# Patient Record
Sex: MALE | Race: WHITE | Employment: OTHER | URBAN - METROPOLITAN AREA
[De-identification: names, ages, dates, MRNs, and addresses within clinical notes are randomized per-mention and may not be internally consistent; named-entity substitution may affect disease eponyms.]

---

## 2017-02-14 DIAGNOSIS — F51.01 PRIMARY INSOMNIA: ICD-10-CM

## 2017-02-15 RX ORDER — DIAZEPAM 5 MG/1
2.5 TABLET ORAL
Qty: 45 TAB | Refills: 1 | Status: SHIPPED | OUTPATIENT
Start: 2017-02-15 | End: 2017-08-31 | Stop reason: SDUPTHER

## 2017-02-16 ENCOUNTER — TELEPHONE (OUTPATIENT)
Dept: INTERNAL MEDICINE CLINIC | Age: 82
End: 2017-02-16

## 2017-03-16 ENCOUNTER — TELEPHONE (OUTPATIENT)
Dept: INTERNAL MEDICINE CLINIC | Age: 82
End: 2017-03-16

## 2017-03-16 NOTE — TELEPHONE ENCOUNTER
Call to patient. LM to return call with information regarding whether he had a flu shot this season or declined.

## 2017-04-03 DIAGNOSIS — I10 ESSENTIAL HYPERTENSION: ICD-10-CM

## 2017-04-03 RX ORDER — FELODIPINE 5 MG/1
5 TABLET, EXTENDED RELEASE ORAL DAILY
Qty: 90 TAB | Refills: 1 | Status: SHIPPED | COMMUNITY
Start: 2017-04-03 | End: 2017-07-13 | Stop reason: SDUPTHER

## 2017-04-03 RX ORDER — LISINOPRIL 10 MG/1
10 TABLET ORAL DAILY
Qty: 90 TAB | Refills: 1 | Status: SHIPPED | COMMUNITY
Start: 2017-04-03 | End: 2017-07-13 | Stop reason: SDUPTHER

## 2017-07-13 DIAGNOSIS — I10 ESSENTIAL HYPERTENSION: ICD-10-CM

## 2017-07-13 RX ORDER — FELODIPINE 5 MG/1
TABLET, EXTENDED RELEASE ORAL
Qty: 90 TAB | Refills: 0 | Status: SHIPPED | OUTPATIENT
Start: 2017-07-13 | End: 2017-08-31 | Stop reason: SDUPTHER

## 2017-07-13 RX ORDER — LISINOPRIL 10 MG/1
TABLET ORAL
Qty: 90 TAB | Refills: 0 | Status: SHIPPED | OUTPATIENT
Start: 2017-07-13 | End: 2017-08-31 | Stop reason: SDUPTHER

## 2017-07-21 ENCOUNTER — TELEPHONE (OUTPATIENT)
Dept: INTERNAL MEDICINE CLINIC | Age: 82
End: 2017-07-21

## 2017-07-21 ENCOUNTER — OFFICE VISIT (OUTPATIENT)
Dept: INTERNAL MEDICINE CLINIC | Age: 82
End: 2017-07-21

## 2017-07-21 VITALS
OXYGEN SATURATION: 95 % | WEIGHT: 159.4 LBS | TEMPERATURE: 97.7 F | SYSTOLIC BLOOD PRESSURE: 128 MMHG | HEART RATE: 66 BPM | RESPIRATION RATE: 18 BRPM | BODY MASS INDEX: 24.16 KG/M2 | DIASTOLIC BLOOD PRESSURE: 70 MMHG | HEIGHT: 68 IN

## 2017-07-21 DIAGNOSIS — T63.461A WASP STING, ACCIDENTAL OR UNINTENTIONAL, INITIAL ENCOUNTER: Primary | ICD-10-CM

## 2017-07-21 DIAGNOSIS — M79.89 LEFT ARM SWELLING: ICD-10-CM

## 2017-07-21 DIAGNOSIS — L03.114 CELLULITIS OF LEFT UPPER EXTREMITY: ICD-10-CM

## 2017-07-21 RX ORDER — CEPHALEXIN 500 MG/1
500 CAPSULE ORAL 3 TIMES DAILY
Qty: 21 CAP | Refills: 0 | Status: SHIPPED | OUTPATIENT
Start: 2017-07-21 | End: 2017-07-25 | Stop reason: ALTCHOICE

## 2017-07-21 RX ORDER — PREDNISONE 20 MG/1
20 TABLET ORAL
Qty: 5 TAB | Refills: 0 | Status: SHIPPED | OUTPATIENT
Start: 2017-07-21 | End: 2017-07-25 | Stop reason: ALTCHOICE

## 2017-07-21 NOTE — PROGRESS NOTES
Chief Complaint   Patient presents with   Rocío Syed 84     1. Have you been to the ER, urgent care clinic since your last visit? Hospitalized since your last visit? No    2. Have you seen or consulted any other health care providers outside of the [address] [place] [address] since your last visit? Include any pap smears or colon screening. No    Wasp bite, swelling.  Yesterday back of left, itching

## 2017-07-21 NOTE — MR AVS SNAPSHOT
Visit Information Date & Time Provider Department Dept. Phone Encounter #  
 7/21/2017  2:30 PM Jazmyn Coyne MD [facility] Internal Medicine [phone] [id number] Follow-up Instructions Return in about 4 days (around 7/25/2017) for Follow-up with Dr. Mayo Shipley. Your Appointments 8/31/2017  9:30 AM  
Medicare Physical with Everton Jackson MD  
[facility] Internal Medicine [facility] Appt Note: Medicare Wellness; rescheduled from 8/21/17  
 [address] [facility] [address]  
[facility] [id number] [place] [address] Upcoming Health Maintenance Date Due  
 GLAUCOMA SCREENING Q2Y 3/30/2017 INFLUENZA AGE 9 TO ADULT 8/1/2017 MEDICARE YEARLY EXAM 8/19/2017 DTaP/Tdap/Td series (2 - Td) 8/20/2025 Allergies as of 7/21/2017  Review Complete On: 7/21/2017 By: Dennie Juneau, LPN No Known Allergies Current Immunizations  Reviewed on 4/6/2016 Name Date Influenza High Dose Vaccine PF 11/9/2016 Influenza Vaccine 8/15/2015 Pneumococcal Conjugate (PCV-13) 8/20/2015 Pneumococcal Polysaccharide (PPSV-23) 8/18/2016 10:30 AM  
 Tdap 8/20/2015 Zoster Vaccine, Live 8/20/2015 Not reviewed this visit You Were Diagnosed With   
  
 Codes Comments Wasp sting, accidental or unintentional, initial encounter    -  Primary ICD-10-CM: Z68.000Q ICD-9-CM: 989.5, E905.3 Left arm swelling     ICD-10-CM: M79.89 ICD-9-CM: 729.81 Cellulitis of left upper extremity     ICD-10-CM: L03.114 
ICD-9-CM: 682. 3 Vitals BP Pulse Temp Resp Height(growth percentile) Weight(growth percentile) 128/70 (BP 1 Location: Right arm, BP Patient Position: Sitting) 66 97.7 °F (36.5 °C) (Oral) 18 5' 8\" (1.727 m) 159 lb 6.4 oz (72.3 kg) SpO2 BMI Smoking Status 95% 24.24 kg/m2 Former Smoker Vitals History BMI and BSA Data Body Mass Index Body Surface Area 24.24 kg/m 2 1.86 m 2 Preferred Pharmacy Pharmacy Name Phone [facility]/PHARMACY #[id number] [address] [address] [phone] Your Updated Medication List  
  
   
This list is accurate as of: 7/21/17  3:28 PM.  Always use your most recent med list.  
  
  
  
  
 aspirin 81 mg tablet Take 81 mg by mouth daily. CENTRUM SILVER Tab tablet Generic drug:  multivitamins-minerals-lutein Take 1 Tab by mouth two (2) times a week. cephALEXin 500 mg capsule Commonly known as:  Estel Hayden Take 1 Cap by mouth three (3) times daily for 7 days. diazePAM 5 mg tablet Commonly known as:  VALIUM Take 0.5 Tabs by mouth nightly as needed for Sleep. Max Daily Amount: 2.5 mg.  
  
 felodipine 5 mg 24 hr tablet Commonly known as:  PLENDIL SR Take 1 tablet by mouth  daily  
  
 finasteride 5 mg tablet Commonly known as:  PROSCAR Take 5 mg by mouth daily. lisinopril 10 mg tablet Commonly known as:  Rosa Amna Take 1 tablet by mouth  daily  
  
 predniSONE 20 mg tablet Commonly known as:  [place] Take 1 Tab by mouth daily (with breakfast) for 5 days. RAPAFLO 8 mg capsule Generic drug:  silodosin Take 8 mg by mouth every evening. SKLICE 0.5 % Lotn Generic drug:  ivermectin  
by Apply Externally route. Prescriptions Sent to Pharmacy Refills  
 predniSONE (DELTASONE) 20 mg tablet 0 Sig: Take 1 Tab by mouth daily (with breakfast) for 5 days. Class: Normal  
 Pharmacy: [facility]/pharmacy #[id number], [address] [place] [address] Ph #: [phone] Route: Oral  
 cephALEXin (KEFLEX) 500 mg capsule 0 Sig: Take 1 Cap by mouth three (3) times daily for 7 days. Class: Normal  
 Pharmacy: [facility]/pharmacy #[id number]- [place], [address] [place] [address] Ph #: [phone] Route: Oral  
  
Follow-up Instructions Return in about 4 days (around 7/25/2017) for Follow-up with Dr. Eleni Vasques. Patient Instructions It was a pleasure to see you! As discussed: Local Allergic reaction with Cellulitis Take antibiotics and prednisone as prescribed Keep your arm elevated as much as possible Do not wear your watch If you develop fevers, chills, n/v/ worsen redness  or severe symptoms- return for reevaluation sooner  or go to ER if your symptoms are severe. Insect Stings and Bites: Care Instructions Your Care Instructions Stings and bites from bees, wasps, ants, and other insects often cause pain, swelling, redness, and itching. In some people, especially children, the redness and swelling may be worse. It may extend several inches beyond the affected area. But in most cases, stings and bites don't cause reactions all over the body. If you have had a reaction to an insect sting or bite, you are at risk for a reaction if you get stung or bitten again. Follow-up care is a key part of your treatment and safety. Be sure to make and go to all appointments, and call your doctor if you are having problems. It's also a good idea to know your test results and keep a list of the medicines you take. How can you care for yourself at home? · Do not scratch or rub the skin where the sting or bite occurred. · Put a cold pack or ice cube on the area. Put a thin cloth between the ice and your skin. For some people, a paste of baking soda mixed with a little water helps relieve pain and decrease the reaction. · Take an over-the-counter antihistamine, such as diphenhydramine (Benadryl) or loratadine (Claritin), to relieve swelling, redness, and itching. Calamine lotion or hydrocortisone cream may also help. Do not give antihistamines to your child unless you have checked with the doctor first. 
· Be safe with medicines. If your doctor prescribed medicine for your allergy, take it exactly as prescribed. Call your doctor if you think you are having a problem with your medicine.  You will get more details on the specific medicines your doctor prescribes. · Your doctor may prescribe a shot of epinephrine to carry with you in case you have a severe reaction. Learn how and when to give yourself the shot, and keep it with you at all times. Make sure it has not . · Go to the emergency room anytime you have a severe reaction. Go even if you have given yourself epinephrine and are feeling better. Symptoms can come back. When should you call for help? Call 911 anytime you think you may need emergency care. For example, call if: 
· You have symptoms of a severe allergic reaction. These may include: 
¨ Sudden raised, red areas (hives) all over your body. ¨ Swelling of the throat, mouth, lips, or tongue. ¨ Trouble breathing. ¨ Passing out (losing consciousness). Or you may feel very lightheaded or suddenly feel weak, confused, or restless. Call your doctor now or seek immediate medical care if: 
· You have symptoms of an allergic reaction not right at the sting or bite, such as: ¨ A rash or small area of hives (raised, red areas on the skin). ¨ Itching. ¨ Swelling. ¨ Belly pain, nausea, or vomiting. · You have a lot of swelling around the site (such as your entire arm or leg is swollen). · You have signs of infection, such as: 
¨ Increased pain, swelling, redness, or warmth around the sting. ¨ Red streaks leading from the area. ¨ Pus draining from the sting. ¨ A fever. Watch closely for changes in your health, and be sure to contact your doctor if: 
· You do not get better as expected. Where can you learn more? Go to http://nara-caroline.info/. Enter P390 in the search box to learn more about \"Insect Stings and Bites: Care Instructions. \" Current as of: 2017 Content Version: 11.3 © 2520-0052 [employer].  Care instructions adapted under license by [employer] (which disclaims liability or warranty for this information). If you have questions about a medical condition or this instruction, always ask your healthcare professional. [address] any warranty or liability for your use of this information. Please provide this summary of care documentation to your next provider. Your primary care clinician is listed as Phillip Murillo. If you have any questions after today's visit, please call [phone].

## 2017-07-21 NOTE — PROGRESS NOTES
HISTORY OF PRESENT ILLNESS  Natali Joseph is a 81 y.o. male. Insect Bite   The current episode started yesterday. The problem occurs constantly. The problem has been gradually worsening. Associated symptoms comments: Redness, swelling and pain worsening  Denies fevers, chills. Had healing skin tear from 3 days prior while doing yardwork . He has tried a warm compress and Benadryl for the symptoms. The treatment provided mild relief. Review of Systems   Constitutional: Negative for chills and fever. Skin:        Per HPI     Patient Active Problem List    Diagnosis Date Noted    ACP (advance care planning) 08/17/2015    Insomnia 02/11/2015    Hypertension     BPH (benign prostatic hypertrophy)        Current Outpatient Prescriptions   Medication Sig Dispense Refill    predniSONE (DELTASONE) 20 mg tablet Take 1 Tab by mouth daily (with breakfast) for 5 days. 5 Tab 0    cephALEXin (KEFLEX) 500 mg capsule Take 1 Cap by mouth three (3) times daily for 7 days. 21 Cap 0    felodipine (PLENDIL SR) 5 mg 24 hr tablet Take 1 tablet by mouth  daily 90 Tab 0    lisinopril (PRINIVIL, ZESTRIL) 10 mg tablet Take 1 tablet by mouth  daily 90 Tab 0    diazePAM (VALIUM) 5 mg tablet Take 0.5 Tabs by mouth nightly as needed for Sleep. Max Daily Amount: 2.5 mg. 45 Tab 1    finasteride (PROSCAR) 5 mg tablet Take 5 mg by mouth daily.  aspirin 81 mg tablet Take 81 mg by mouth daily.  multivitamins-minerals-lutein (CENTRUM SILVER) Tab Take 1 Tab by mouth two (2) times a week.  ivermectin (SKLICE) 0.5 % lotn by Apply Externally route.  RAPAFLO 8 mg capsule Take 8 mg by mouth every evening.          No Known Allergies   Visit Vitals    /70 (BP 1 Location: Right arm, BP Patient Position: Sitting)    Pulse 66    Temp 97.7 °F (36.5 °C) (Oral)    Resp 18    Ht 5' 8\" (1.727 m)    Wt 159 lb 6.4 oz (72.3 kg)    SpO2 95%    BMI 24.24 kg/m2       Physical Exam   Constitutional: He is oriented to person, place, and time. No distress. Neurological: He is alert and oriented to person, place, and time. Psychiatric: He has a normal mood and affect. LUE: Erythema and calor FROM in hand     ASSESSMENT and Sammy Given was seen today for insect bite complicated by large localized reaction with possible cellulitis. RICE and treatment as ordered. Red flags to warrant ER or earlier clinical evaluation reviewed. See AVS for full details of plan and patient discussion. Diagnoses and all orders for this visit:    Wasp sting, accidental or unintentional, initial encounter  -     predniSONE (DELTASONE) 20 mg tablet; Take 1 Tab by mouth daily (with breakfast) for 5 days. Left arm swelling  -     predniSONE (DELTASONE) 20 mg tablet; Take 1 Tab by mouth daily (with breakfast) for 5 days. Cellulitis of left upper extremity  -     cephALEXin (KEFLEX) 500 mg capsule; Take 1 Cap by mouth three (3) times daily for 7 days. Follow-up Disposition:  Return in about 4 days (around 7/25/2017) for Follow-up with Dr. Zaheer Thurston. Medication risks/benefits/costs/interactions/alternatives discussed with patient. Td Hines  was given an after visit summary which includes diagnoses, current medications, & vitals. he expressed understanding with the diagnosis and plan.

## 2017-07-21 NOTE — PATIENT INSTRUCTIONS
It was a pleasure to see you! As discussed:      Local Allergic reaction with Cellulitis  Take antibiotics and prednisone as prescribed  Keep your arm elevated as much as possible  Do not wear your watch  If you develop fevers, chills, n/v/ worsen redness  or severe symptoms- return for reevaluation sooner  or go to ER if your symptoms are severe. Insect Stings and Bites: Care Instructions  Your Care Instructions  Stings and bites from bees, wasps, ants, and other insects often cause pain, swelling, redness, and itching. In some people, especially children, the redness and swelling may be worse. It may extend several inches beyond the affected area. But in most cases, stings and bites don't cause reactions all over the body. If you have had a reaction to an insect sting or bite, you are at risk for a reaction if you get stung or bitten again. Follow-up care is a key part of your treatment and safety. Be sure to make and go to all appointments, and call your doctor if you are having problems. It's also a good idea to know your test results and keep a list of the medicines you take. How can you care for yourself at home? · Do not scratch or rub the skin where the sting or bite occurred. · Put a cold pack or ice cube on the area. Put a thin cloth between the ice and your skin. For some people, a paste of baking soda mixed with a little water helps relieve pain and decrease the reaction. · Take an over-the-counter antihistamine, such as diphenhydramine (Benadryl) or loratadine (Claritin), to relieve swelling, redness, and itching. Calamine lotion or hydrocortisone cream may also help. Do not give antihistamines to your child unless you have checked with the doctor first.  · Be safe with medicines. If your doctor prescribed medicine for your allergy, take it exactly as prescribed. Call your doctor if you think you are having a problem with your medicine.  You will get more details on the specific medicines your doctor prescribes. · Your doctor may prescribe a shot of epinephrine to carry with you in case you have a severe reaction. Learn how and when to give yourself the shot, and keep it with you at all times. Make sure it has not . · Go to the emergency room anytime you have a severe reaction. Go even if you have given yourself epinephrine and are feeling better. Symptoms can come back. When should you call for help? Call 911 anytime you think you may need emergency care. For example, call if:  · You have symptoms of a severe allergic reaction. These may include:  ¨ Sudden raised, red areas (hives) all over your body. ¨ Swelling of the throat, mouth, lips, or tongue. ¨ Trouble breathing. ¨ Passing out (losing consciousness). Or you may feel very lightheaded or suddenly feel weak, confused, or restless. Call your doctor now or seek immediate medical care if:  · You have symptoms of an allergic reaction not right at the sting or bite, such as:  ¨ A rash or small area of hives (raised, red areas on the skin). ¨ Itching. ¨ Swelling. ¨ Belly pain, nausea, or vomiting. · You have a lot of swelling around the site (such as your entire arm or leg is swollen). · You have signs of infection, such as:  ¨ Increased pain, swelling, redness, or warmth around the sting. ¨ Red streaks leading from the area. ¨ Pus draining from the sting. ¨ A fever. Watch closely for changes in your health, and be sure to contact your doctor if:  · You do not get better as expected. Where can you learn more? Go to http://nara-caroline.info/. Enter P390 in the search box to learn more about \"Insect Stings and Bites: Care Instructions. \"  Current as of: 2017  Content Version: 11.3  © 8244-8419 [employer] Care instructions adapted under license by [employer] (which disclaims liability or warranty for this information).  If you have questions about a medical condition or this instruction, always ask your healthcare professional. [place] [address] any warranty or liability for your use of this information.

## 2017-07-21 NOTE — TELEPHONE ENCOUNTER
[phone] pt says that he was stung by a wasp last night and wonders what he can get over the counter to help with this.

## 2017-07-25 ENCOUNTER — OFFICE VISIT (OUTPATIENT)
Dept: INTERNAL MEDICINE CLINIC | Age: 82
End: 2017-07-25

## 2017-07-25 VITALS
TEMPERATURE: 97.1 F | WEIGHT: 161 LBS | HEIGHT: 68 IN | SYSTOLIC BLOOD PRESSURE: 140 MMHG | BODY MASS INDEX: 24.4 KG/M2 | OXYGEN SATURATION: 97 % | DIASTOLIC BLOOD PRESSURE: 80 MMHG | RESPIRATION RATE: 19 BRPM | HEART RATE: 59 BPM

## 2017-07-25 DIAGNOSIS — T63.461D WASP STING, ACCIDENTAL OR UNINTENTIONAL, SUBSEQUENT ENCOUNTER: Primary | ICD-10-CM

## 2017-07-25 PROBLEM — Z91.030 BEE STING ALLERGY: Status: ACTIVE | Noted: 2017-07-25

## 2017-07-25 NOTE — PATIENT INSTRUCTIONS
Insect Stings and Bites: Care Instructions  Your Care Instructions  Stings and bites from bees, wasps, ants, and other insects often cause pain, swelling, redness, and itching. In some people, especially children, the redness and swelling may be worse. It may extend several inches beyond the affected area. But in most cases, stings and bites don't cause reactions all over the body. If you have had a reaction to an insect sting or bite, you are at risk for a reaction if you get stung or bitten again. Follow-up care is a key part of your treatment and safety. Be sure to make and go to all appointments, and call your doctor if you are having problems. It's also a good idea to know your test results and keep a list of the medicines you take. How can you care for yourself at home? · Do not scratch or rub the skin where the sting or bite occurred. · Put a cold pack or ice cube on the area. Put a thin cloth between the ice and your skin. For some people, a paste of baking soda mixed with a little water helps relieve pain and decrease the reaction. · Take an over-the-counter antihistamine, such as diphenhydramine (Benadryl) or loratadine (Claritin), to relieve swelling, redness, and itching. Calamine lotion or hydrocortisone cream may also help. Do not give antihistamines to your child unless you have checked with the doctor first.  · Be safe with medicines. If your doctor prescribed medicine for your allergy, take it exactly as prescribed. Call your doctor if you think you are having a problem with your medicine. You will get more details on the specific medicines your doctor prescribes. · Your doctor may prescribe a shot of epinephrine to carry with you in case you have a severe reaction. Learn how and when to give yourself the shot, and keep it with you at all times. Make sure it has not . · Go to the emergency room anytime you have a severe reaction.  Go even if you have given yourself epinephrine and are feeling better. Symptoms can come back. When should you call for help? Call 911 anytime you think you may need emergency care. For example, call if:  · You have symptoms of a severe allergic reaction. These may include:  ¨ Sudden raised, red areas (hives) all over your body. ¨ Swelling of the throat, mouth, lips, or tongue. ¨ Trouble breathing. ¨ Passing out (losing consciousness). Or you may feel very lightheaded or suddenly feel weak, confused, or restless. Call your doctor now or seek immediate medical care if:  · You have symptoms of an allergic reaction not right at the sting or bite, such as:  ¨ A rash or small area of hives (raised, red areas on the skin). ¨ Itching. ¨ Swelling. ¨ Belly pain, nausea, or vomiting. · You have a lot of swelling around the site (such as your entire arm or leg is swollen). · You have signs of infection, such as:  ¨ Increased pain, swelling, redness, or warmth around the sting. ¨ Red streaks leading from the area. ¨ Pus draining from the sting. ¨ A fever. Watch closely for changes in your health, and be sure to contact your doctor if:  · You do not get better as expected. Where can you learn more? Go to http://nara-caroline.info/. Enter P390 in the search box to learn more about \"Insect Stings and Bites: Care Instructions. \"  Current as of: March 20, 2017  Content Version: 11.3  © 9972-3584 [employer]. Care instructions adapted under license by [employer] (which disclaims liability or warranty for this information). If you have questions about a medical condition or this instruction, always ask your healthcare professional. [place] [address] any warranty or liability for your use of this information.

## 2017-07-25 NOTE — PROGRESS NOTES
Pt is here to follow up on status post bee sting on Friday; ABT currently in progress and prednisone was completed today. Pt denied having any pain at the moment.

## 2017-07-25 NOTE — PROGRESS NOTES
Gilbert Roman is a 81 y.o. male who was seen in clinic today (7/25/2017). Assessment & Plan:  Trey Calderon was seen today for bee sting. Wasp sting, accidental or unintentional, subsequent encounter- resolved, will stop abx as no signs of infection. Did review likely worse reaction this time due to this being 2nd sting in a few weeks. Did review having steroids an hand to use prn or to call for meds. Did review reaction may improve the farther away he is when he is stung next time. He will call next time he gets stung. Will restart Prednisone taper used previously w/o appointment. Reviewed role of epi pen and not indicated. Follow-up Disposition:  Return if symptoms worsen or fail to improve.         ----------------------------------------------------------------------    Subjective:  Trey Calderon was seen today for [facility] Follow Up  Gilbert Roman is seen for follow up from recent urgent visit to [facility]. He was seen by Dr Gunnar Collier on 7/21 for a wasp/bee sting. He was treated with prednisone & antibiotics & benadryl. He has been stung twice this summer. First time about 6 wks ago and reaction was minimal.  His symptoms are greatly improved. He is taking his medications as directed & w/o side effects.      ----------------------------------------------------------------------      Prior to Admission medications    Medication Sig Start Date End Date Taking? Authorizing Provider   cephALEXin (KEFLEX) 500 mg capsule Take 1 Cap by mouth three (3) times daily for 7 days. 7/21/17 7/28/17 Yes Louis Aj MD   felodipine (PLENDIL SR) 5 mg 24 hr tablet Take 1 tablet by mouth  daily 7/13/17  Yes Romana Cypher, MD   lisinopril (PRINIVIL, ZESTRIL) 10 mg tablet Take 1 tablet by mouth  daily 7/13/17  Yes Romana Cypher, MD   diazePAM (VALIUM) 5 mg tablet Take 0.5 Tabs by mouth nightly as needed for Sleep.  Max Daily Amount: 2.5 mg. 2/15/17  Yes Romana Cypher, MD ivermectin (SKLICE) 0.5 % lotn by Apply Externally route. Yes Historical Provider   finasteride (PROSCAR) 5 mg tablet Take 5 mg by mouth daily. 8/15/15  Yes Historical Provider   RAPAFLO 8 mg capsule Take 8 mg by mouth every evening. 8/15/15  Yes Historical Provider   aspirin 81 mg tablet Take 81 mg by mouth daily. Yes Historical Provider   multivitamins-minerals-lutein (CENTRUM SILVER) Tab Take 1 Tab by mouth two (2) times a week. Yes Historical Provider          No Known Allergies        Review of Systems   HENT: Negative for sore throat. Respiratory: Negative for shortness of breath and stridor. Objective:   Physical Exam   Skin:   Swelling in L hand/forearm has resolved. No edema. No erythema         Visit Vitals    /80 (BP 1 Location: Right arm, BP Patient Position: Sitting)    Pulse (!) 59    Temp 97.1 °F (36.2 °C) (Oral)    Resp 19    Ht 5' 8\" (1.727 m)    Wt 161 lb (73 kg)    SpO2 97%    BMI 24.48 kg/m2         Disclaimer:  Advised him to call back or return to office if symptoms worsen/change/persist.  Discussed expected course/resolution/complications of diagnosis in detail with patient. Medication risks/benefits/costs/interactions/alternatives discussed with patient. He was given an after visit summary which includes diagnoses, current medications, & vitals. He expressed understanding with the diagnosis and plan.         Reagan Karimi MD

## 2017-08-31 ENCOUNTER — OFFICE VISIT (OUTPATIENT)
Dept: INTERNAL MEDICINE CLINIC | Age: 82
End: 2017-08-31

## 2017-08-31 ENCOUNTER — HOSPITAL ENCOUNTER (OUTPATIENT)
Dept: LAB | Age: 82
Discharge: HOME | End: 2017-08-31
Payer: MEDICARE

## 2017-08-31 VITALS
DIASTOLIC BLOOD PRESSURE: 76 MMHG | RESPIRATION RATE: 16 BRPM | WEIGHT: 155 LBS | BODY MASS INDEX: 23.49 KG/M2 | SYSTOLIC BLOOD PRESSURE: 138 MMHG | OXYGEN SATURATION: 99 % | TEMPERATURE: 97.7 F | HEART RATE: 60 BPM | HEIGHT: 68 IN

## 2017-08-31 DIAGNOSIS — Z13.39 SCREENING FOR ALCOHOLISM: ICD-10-CM

## 2017-08-31 DIAGNOSIS — I10 ESSENTIAL HYPERTENSION: ICD-10-CM

## 2017-08-31 DIAGNOSIS — Z71.89 ACP (ADVANCE CARE PLANNING): ICD-10-CM

## 2017-08-31 DIAGNOSIS — N40.1 BENIGN PROSTATIC HYPERPLASIA WITH LOWER URINARY TRACT SYMPTOMS, UNSPECIFIED MORPHOLOGY: ICD-10-CM

## 2017-08-31 DIAGNOSIS — Z00.00 MEDICARE ANNUAL WELLNESS VISIT, SUBSEQUENT: Primary | ICD-10-CM

## 2017-08-31 DIAGNOSIS — F51.01 PRIMARY INSOMNIA: ICD-10-CM

## 2017-08-31 PROCEDURE — 36415 COLL VENOUS BLD VENIPUNCTURE: CPT

## 2017-08-31 PROCEDURE — 80053 COMPREHEN METABOLIC PANEL: CPT

## 2017-08-31 PROCEDURE — 85027 COMPLETE CBC AUTOMATED: CPT

## 2017-08-31 RX ORDER — DIAZEPAM 5 MG/1
2.5 TABLET ORAL
Qty: 45 TAB | Refills: 1 | Status: SHIPPED | OUTPATIENT
Start: 2017-08-31 | End: 2018-01-12 | Stop reason: SDUPTHER

## 2017-08-31 RX ORDER — LISINOPRIL 10 MG/1
TABLET ORAL
Qty: 90 TAB | Refills: 3 | Status: SHIPPED | COMMUNITY
Start: 2017-08-31 | End: 2017-10-02 | Stop reason: ALTCHOICE

## 2017-08-31 RX ORDER — FELODIPINE 5 MG/1
TABLET, EXTENDED RELEASE ORAL
Qty: 90 TAB | Refills: 3 | Status: SHIPPED | COMMUNITY
Start: 2017-08-31 | End: 2018-01-29 | Stop reason: SDUPTHER

## 2017-08-31 NOTE — PROGRESS NOTES
Dipak Nicholson is a 81 y.o. male who was seen in clinic today (8/31/2017) for a full physical.      Assessment & Plan:   Diagnoses and all orders for this visit:    1. Medicare annual wellness visit, subsequent    2. ACP (advance care planning)    3. Screening for alcoholism  -     Annual  Alcohol Screen 15 min ()    4. Essential hypertension- well controlled, continue current treatment pending review of labs   -     felodipine (PLENDIL SR) 5 mg 24 hr tablet; Take 1 tablet by mouth  daily  -     lisinopril (PRINIVIL, ZESTRIL) 10 mg tablet; Take 1 tablet by mouth  daily  -     METABOLIC PANEL, COMPREHENSIVE  -     CBC W/O DIFF    5. Primary insomnia- well controlled, continue current treatment, [facility]  reviewed   -     diazePAM (VALIUM) 5 mg tablet; Take 0.5 Tabs by mouth nightly as needed for Sleep. Max Daily Amount: 2.5 mg.    6. Benign prostatic hyperplasia with lower urinary tract symptoms, unspecified morphology- stable, continue current treatment and defer to specialist          Follow-up Disposition:  Return in about 1 year (around 8/31/2018), or if symptoms worsen or fail to improve, for FULL PHYSICAL - 30 minutes. ------------------------------------------------------------------------------------------    Subjective: Annual Wellness Visit- Subsequent Visit    End of Life Planning: This was discussed with him today and he has an advanced directive - a copy has been provided. Reviewed DNR/DNI and patient is not interested but is considering it. Depression Screen:   PHQ over the last two weeks 8/31/2017   Little interest or pleasure in doing things Not at all   Feeling down, depressed or hopeless Not at all   Total Score PHQ 2 0       Fall Risk:   Fall Risk Assessment, last 12 mths 8/31/2017   Able to walk? Yes   Fall in past 12 months? No       Abuse Screen:  Abuse Screening Questionnaire 8/31/2017   Do you ever feel afraid of your partner?  N   Are you in a relationship with someone who physically or mentally threatens you? N   Is it safe for you to go home? Y         Alcohol Risk Factor Screening: On any occasion during the past 3 months, have you had more than 4 drinks containing alcohol? No  Do you average more than 14 drinks per week? No    Hearing Loss: The patient wears hearing aids. Activities of Daily Living:    Requires assistance with: no ADLs  Home contains: no safety equipment  Exercise: no formal exercise but active though out the day    Cognition Screen:  Has your family/caregiver stated any concerns about your memory: no  appropriate for age attention/concentration and appropriate safety awareness    Adult Nutrition Screen:  No risk factors noted. Health Maintenance  Daily Aspirin: yes  AAA Screening: n/a (IPPE only)  Glaucoma Screening: Records requested      Immunizations:     Influenza: he will plan on getting it this fall. Tetanus: up to date. Shingles: up to date. Pneumonia: up to date. Cancer screening:    Prostate: n/a, reviewed guidelines. Colon: n/a, guidelines reviewed. Patient Care Team:  Tamar Moy MD as PCP - General (Internal Medicine)  Vicki Castelan MD (Urology)  Cathy Gu MD (Dermatology)  Crow Talamantes MD as Physician (Ophthalmology)       In addition to the above issues we also reviewed the following acute and/or chronic problems:    Cardiovascular Review  The patient has hypertension. Since last visit: no changes. He reports taking medications as instructed, no medication side effects noted. Diet and Lifestyle: generally follows a low fat low cholesterol diet, generally follows a low sodium diet, no formal exercise but active during the day. Labs: reviewed and discussed with patient. Lab Results   Component Value Date/Time    Sodium 141 08/18/2016 11:08 AM    Potassium 5.3 08/18/2016 11:08 AM        Mental Health Review  Patient is seen for insomnia. Ongoing sleep issues include: trouble staying asleep.   He denies: trouble falling asleep. Reports experiences the following side effects from the treatment: none. The following sections were reviewed & updated as appropriate: PMH, PSH, FH, and SH. Patient Active Problem List   Diagnosis Code    Hypertension I10    BPH (benign prostatic hypertrophy) N40.0    Insomnia G47.00    ACP (advance care planning) Z71.89    Bee sting allergy Z91.038          Prior to Admission medications    Medication Sig Start Date End Date Taking? Authorizing Provider   felodipine (PLENDIL SR) 5 mg 24 hr tablet Take 1 tablet by mouth  daily 7/13/17  Yes Santi Esparza MD   lisinopril (PRINIVIL, ZESTRIL) 10 mg tablet Take 1 tablet by mouth  daily 7/13/17  Yes Santi Esparza MD   diazePAM (VALIUM) 5 mg tablet Take 0.5 Tabs by mouth nightly as needed for Sleep. Max Daily Amount: 2.5 mg. 2/15/17  Yes Santi Esparza MD   finasteride (PROSCAR) 5 mg tablet Take 5 mg by mouth daily. 8/15/15  Yes Historical Provider   aspirin 81 mg tablet Take 81 mg by mouth daily. Yes Historical Provider   multivitamins-minerals-lutein (CENTRUM SILVER) Tab Take 1 Tab by mouth two (2) times a week. Yes Historical Provider   RAPAFLO 8 mg capsule Take 8 mg by mouth every evening. 8/15/15   Historical Provider          No Known Allergies           Review of Systems   Constitutional: Negative for malaise/fatigue and weight loss. Respiratory: Negative for cough and shortness of breath. Cardiovascular: Negative for chest pain, palpitations and leg swelling. Gastrointestinal: Negative for abdominal pain, constipation, diarrhea, heartburn, nausea and vomiting. Genitourinary: Negative for frequency. Musculoskeletal: Negative for joint pain and myalgias. Skin: Negative for rash. Neurological: Negative for tingling, sensory change, focal weakness and headaches. Psychiatric/Behavioral: Negative for depression. The patient is not nervous/anxious and does not have insomnia. Objective:   Physical Exam   Constitutional: He appears well-developed. No distress. HENT:   Nose: Nose normal.   Mouth/Throat: Uvula is midline, oropharynx is clear and moist and mucous membranes are normal. No posterior oropharyngeal erythema. Bilateral hearing aides present   Eyes: Conjunctivae and lids are normal. No scleral icterus. Neck: Neck supple. Carotid bruit is not present. No thyromegaly present. Cardiovascular: Regular rhythm and normal heart sounds. No murmur heard. Pulses:       Dorsalis pedis pulses are 2+ on the right side, and 2+ on the left side. Posterior tibial pulses are 2+ on the right side, and 2+ on the left side. Pulmonary/Chest: Effort normal and breath sounds normal. He has no wheezes. He has no rales. Abdominal: Soft. Bowel sounds are normal. He exhibits no mass. There is no hepatosplenomegaly. There is no tenderness. Musculoskeletal: He exhibits no edema. Cervical back: Normal.        Thoracic back: He exhibits no bony tenderness. Lumbar back: Normal.   Lymphadenopathy:     He has no cervical adenopathy. Neurological: He has normal strength. No sensory deficit. Skin: No rash noted. Psychiatric: He has a normal mood and affect. His behavior is normal.          Visit Vitals    /76    Pulse 60    Temp 97.7 °F (36.5 °C) (Oral)    Resp 16    Ht 5' 7.65\" (1.718 m)    Wt 155 lb (70.3 kg)    SpO2 99%    BMI 23.81 kg/m2          Advised him to call back or return to office if symptoms worsen/change/persist.  Discussed expected course/resolution/complications of diagnosis in detail with patient. Medication risks/benefits/costs/interactions/alternatives discussed with patient. He was given an after visit summary which includes diagnoses, current medications, & vitals. He expressed understanding with the diagnosis and plan.         German Rich MD

## 2017-08-31 NOTE — ACP (ADVANCE CARE PLANNING)
Advance Care Planning (ACP) Provider Note - Comprehensive     Date of ACP Conversation: 08/31/17  Persons included in Conversation:  patient      Authorized Decision Maker (if patient is incapable of making informed decisions): This person is: Healthcare Agent/Medical Power of  under Advance Directive          General ACP for ALL Patients with Decision Making Capacity:  Importance of advance care planning, including choosing a healthcare agent to communicate patient's healthcare decisions if patient lost the ability to make decisions, such as after a sudden illness or accident  Understanding of the healthcare agent role was assessed and information provided  Opportunity offered to explore how cultural, Cheondoism, spiritual, or personal beliefs would affect decisions for future care  Exploration of values, goals, and preferences if recovery is not expected, even with continued medical treatment in the event of: Imminent death or severe, permanent brain injury    For Serious or Chronic Illness:  Understanding of CPR, goals and expected outcomes, benefits and burdens discussed. Understanding of medical condition  Information on CPR success rates provided (e.g. for CPR in hospital, survival to d/c at two weeks is 22%, for chronically ill or elderly/frail survival is less than 3%)    Interventions Provided:  Reviewed existing Advance Directive   Recommended communicating the plan and making copies for the healthcare agent, personal physician, and others as appropriate (e.g., health system)  Recommended review of completed ACP document annually or upon change in health status       He has an advanced directive - a copy has been provided & still reflects him wishes. Reviewed DNR/DNI and patient is not interested, but was provided material to think about as he wants to consider this. Shawn Watson

## 2017-08-31 NOTE — MR AVS SNAPSHOT
Visit Information Date & Time Provider Department Dept. Phone Encounter #  
 2017  9:30 AM Mateusz Eden, [address] [facility] Internal Medicine [phone] [id number] Follow-up Instructions Return in about 1 year (around 2018), or if symptoms worsen or fail to improve, for FULL PHYSICAL - 30 minutes. Upcoming Health Maintenance Date Due  
 GLAUCOMA SCREENING Q2Y 3/30/2017 INFLUENZA AGE 9 TO ADULT 2017 MEDICARE YEARLY EXAM 2017 DTaP/Tdap/Td series (2 - Td) 2025 Allergies as of 2017  Review Complete On: 2017 By: Mateusz Eden MD  
 No Known Allergies Current Immunizations  Reviewed on 2017 Name Date Influenza High Dose Vaccine PF 2016 Influenza Vaccine 8/15/2015 Pneumococcal Conjugate (PCV-13) 2015 Pneumococcal Polysaccharide (PPSV-23) 2016 10:30 AM  
 Tdap 2015 Zoster Vaccine, Live 2015 Reviewed by Tobey Nissen, RN on 2017 at  9:40 AM  
You Were Diagnosed With   
  
 Codes Comments Medicare annual wellness visit, subsequent    -  Primary ICD-10-CM: Z00.00 ICD-9-CM: V70.0 ACP (advance care planning)     ICD-10-CM: Z71.89 ICD-9-CM: V65.49 Screening for alcoholism     ICD-10-CM: Z13.89 ICD-9-CM: V79.1 Essential hypertension     ICD-10-CM: I10 
ICD-9-CM: 401.9 Primary insomnia     ICD-10-CM: F51.01 
ICD-9-CM: 307.42 Benign prostatic hyperplasia with lower urinary tract symptoms, unspecified morphology     ICD-10-CM: N40.1 ICD-9-CM: 600.01 Bee sting allergy     ICD-10-CM: Z91.038 
ICD-9-CM: V15.06 Vitals BP Pulse Temp Resp Height(growth percentile) Weight(growth percentile) 138/76 60 97.7 °F (36.5 °C) (Oral) 16 5' 7.65\" (1.718 m) 155 lb (70.3 kg) SpO2 BMI Smoking Status 99% 23.81 kg/m2 Former Smoker Vitals History BMI and BSA Data  Body Mass Index Body Surface Area  
 23.81 kg/m 2 1.83 m 2  
  
  
 Preferred Pharmacy Pharmacy Name Phone [address] [facility], [address] [address] [place] [address] [address] Eliane Keenan Your Updated Medication List  
  
   
This list is accurate as of: 8/31/17 10:17 AM.  Always use your most recent med list.  
  
  
  
  
 aspirin 81 mg tablet Take 81 mg by mouth daily. CENTRUM SILVER Tab tablet Generic drug:  multivitamins-minerals-lutein Take 1 Tab by mouth two (2) times a week. diazePAM 5 mg tablet Commonly known as:  VALIUM Take 0.5 Tabs by mouth nightly as needed for Sleep. Max Daily Amount: 2.5 mg.  
  
 felodipine 5 mg 24 hr tablet Commonly known as:  PLENDIL SR Take 1 tablet by mouth  daily  
  
 finasteride 5 mg tablet Commonly known as:  PROSCAR Take 5 mg by mouth daily. lisinopril 10 mg tablet Commonly known as:  Karn Desanctis Take 1 tablet by mouth  daily RAPAFLO 8 mg capsule Generic drug:  silodosin Take 8 mg by mouth every evening. Prescriptions Printed Refills  
 diazePAM (VALIUM) 5 mg tablet 1 Sig: Take 0.5 Tabs by mouth nightly as needed for Sleep. Max Daily Amount: 2.5 mg.  
 Class: Print Route: Oral  
  
Prescriptions Sent to Mail Order Refills  
 felodipine (PLENDIL SR) 5 mg 24 hr tablet 3 Sig: Take 1 tablet by mouth  daily Class: Mail Order Pharmacy: [facility] [address] Ph #: [phone]  
 lisinopril (PRINIVIL, ZESTRIL) 10 mg tablet 3 Sig: Take 1 tablet by mouth  daily Class: Mail Order Pharmacy: [facility] [address] [address] Ph #: [phone] We Performed the Following CBC W/O DIFF [09797 CPT(R)] METABOLIC PANEL, COMPREHENSIVE [93948 CPT(R)] NM ANNUAL ALCOHOL SCREEN 15 MIN [id number] [facility]] Follow-up Instructions Return in about 1 year (around 8/31/2018), or if symptoms worsen or fail to improve, for FULL PHYSICAL - 30 minutes. Patient Instructions Medicare Wellness Visit, Male The best way to live healthy is to have a healthy lifestyle by eating a well-balanced diet, exercising regularly, limiting alcohol and stopping smoking. Regular physical exams and screening tests are another way to keep healthy. Preventive exams provided by your health care provider can find health problems before they become diseases or illnesses. Preventive services including immunizations, screening tests, monitoring and exams can help you take care of your own health. All people over age 72 should have a pneumovax  and and a prevnar shot to prevent pneumonia. These are once in a lifetime unless you and your provider decide differently. All people over 65 should have a yearly flu shot and a tetanus vaccine every 10 years. Screening for diabetes mellitus with a blood sugar test should be done every year. Glaucoma is a disease of the eye due to increased ocular pressure that can lead to blindness and it should be done every year by an eye professional. 
 
Cardiovascular screening tests that check for elevated lipids (fatty part of blood) which can lead to heart disease and strokes should be done every 5 years. Colorectal screening that evaluates for blood or polyps in your colon should be done yearly as a stool test or every five years as a flexible sigmoidoscope or every 10 years as a colonoscopy up to age 76. Men up to age 76 may need a screening blood test for prostate cancer at certain intervals, depending on their personal and family history. This decision is between the patient and his provider. If you have been a smoker or had family history of abdominal aortic aneurysms, you and your provider may decide to schedule an ultrasound test of your aorta. Hepatitis C screening is also recommended for anyone born between [birth year] through [address]. A shingles vaccine is also recommended once in a lifetime after age 61. Your Medicare Wellness Exam is recommended annually. Here is a list of your current Health Maintenance items with a due date: 
Health Maintenance Due Topic Date Due  Glaucoma Screening   03/30/2017  Flu Vaccine  08/01/2017 [facility] Annual Well Visit  08/19/2017 \[facility] [place] [facility]\"" & HEALTH SERVICES! Dear Masood Do: Thank you for requesting a [employer] account. Our records indicate that you have previously registered for a [employer] account but its currently inactive. Please call our [employer] support line at [phone]. Additional Information If you have questions, please visit the Frequently Asked Questions section of the [employer] website at [email or web address] Remember, [employer] is NOT to be used for urgent needs. For medical emergencies, dial 911. Now available from your iPhone and Android! Please provide this summary of care documentation to your next provider. Your primary care clinician is listed as Willard Marlow. If you have any questions after today's visit, please call [phone].

## 2017-08-31 NOTE — PATIENT INSTRUCTIONS

## 2017-09-01 LAB
ALBUMIN SERPL-MCNC: 4.3 G/DL (ref 3.5–4.7)
ALBUMIN/GLOB SERPL: 2 {RATIO} (ref 1.2–2.2)
ALP SERPL-CCNC: 65 IU/L (ref 39–117)
ALT SERPL-CCNC: 16 IU/L (ref 0–44)
AST SERPL-CCNC: 21 IU/L (ref 0–40)
BILIRUB SERPL-MCNC: 0.7 MG/DL (ref 0–1.2)
BUN SERPL-MCNC: 13 MG/DL (ref 8–27)
BUN/CREAT SERPL: 18 (ref 10–24)
CALCIUM SERPL-MCNC: 9.3 MG/DL (ref 8.6–10.2)
CHLORIDE SERPL-SCNC: 104 MMOL/L (ref 96–106)
CO2 SERPL-SCNC: 26 MMOL/L (ref 18–29)
CREAT SERPL-MCNC: 0.71 MG/DL (ref 0.76–1.27)
ERYTHROCYTE [DISTWIDTH] IN BLOOD BY AUTOMATED COUNT: 13.9 % (ref 12.3–15.4)
GLOBULIN SER CALC-MCNC: 2.2 G/DL (ref 1.5–4.5)
GLUCOSE SERPL-MCNC: 94 MG/DL (ref 65–99)
HCT VFR BLD AUTO: 49.1 % (ref 37.5–51)
HGB BLD-MCNC: 16.5 G/DL (ref 12.6–17.7)
MCH RBC QN AUTO: 32.7 PG (ref 26.6–33)
MCHC RBC AUTO-ENTMCNC: 33.6 G/DL (ref 31.5–35.7)
MCV RBC AUTO: 97 FL (ref 79–97)
MORPHOLOGY BLD-IMP: NORMAL
PLATELET # BLD AUTO: 244 X10E3/UL (ref 150–379)
POTASSIUM SERPL-SCNC: 5.6 MMOL/L (ref 3.5–5.2)
PROT SERPL-MCNC: 6.5 G/DL (ref 6–8.5)
RBC # BLD AUTO: 5.04 X10E6/UL (ref 4.14–5.8)
SODIUM SERPL-SCNC: 142 MMOL/L (ref 134–144)
WBC # BLD AUTO: 5.7 X10E3/UL (ref 3.4–10.8)

## 2017-09-01 NOTE — PROGRESS NOTES
Please call patient. All labs are stable or at goal except for high K.  Needs to stop lisinopril. This can raise his K level. Would recommend RTC in 1 month to check BP and repeat labs.

## 2017-09-01 NOTE — PROGRESS NOTES
Per patient he was called earlier today with the results and will be here for follow up appointment. Pt will not eat lunch prior to his 4pm October appointment.

## 2017-10-02 ENCOUNTER — HOSPITAL ENCOUNTER (OUTPATIENT)
Dept: LAB | Age: 82
Discharge: HOME | End: 2017-10-02
Payer: MEDICARE

## 2017-10-02 ENCOUNTER — OFFICE VISIT (OUTPATIENT)
Dept: INTERNAL MEDICINE CLINIC | Age: 82
End: 2017-10-02

## 2017-10-02 VITALS
OXYGEN SATURATION: 96 % | RESPIRATION RATE: 14 BRPM | SYSTOLIC BLOOD PRESSURE: 132 MMHG | WEIGHT: 155 LBS | DIASTOLIC BLOOD PRESSURE: 80 MMHG | BODY MASS INDEX: 23.49 KG/M2 | HEART RATE: 70 BPM | TEMPERATURE: 98.1 F | HEIGHT: 68 IN

## 2017-10-02 DIAGNOSIS — I10 ESSENTIAL HYPERTENSION: Primary | ICD-10-CM

## 2017-10-02 DIAGNOSIS — Z71.89 ACP (ADVANCE CARE PLANNING): ICD-10-CM

## 2017-10-02 DIAGNOSIS — Z23 ENCOUNTER FOR IMMUNIZATION: ICD-10-CM

## 2017-10-02 PROCEDURE — 36415 COLL VENOUS BLD VENIPUNCTURE: CPT

## 2017-10-02 PROCEDURE — 80048 BASIC METABOLIC PNL TOTAL CA: CPT

## 2017-10-02 NOTE — PROGRESS NOTES
Alma Haider is a 81 y.o. male who was seen in clinic today (10/2/2017). Assessment & Plan:  Diagnoses and all orders for this visit:    1. Essential hypertension- well controlled, continue current treatment (off lisinopril) and will check labs to verify K improved  -     METABOLIC PANEL, BASIC    2. Encounter for immunization  -     INFLUENZA VIRUS VACCINE, HIGH DOSE SEASONAL, PRESERVATIVE FREE  -     ADMIN INFLUENZA VIRUS VAC    3. ACP (advance care planning)- see ACP note, DNR forms completed. -     DO NOT RESUSCITATE         Follow-up Disposition:  Return if symptoms worsen or fail to improve.       ----------------------------------------------------------------------    Subjective:  Chani Pinto was seen today for Hypertension and Abnormal Lab Results    Cardiovascular Review  The patient has hypertension. Since last visit: lisinopril stopped due to elevated K levels. He reports taking medications as instructed, no medication side effects noted, patient does not perform home BP monitoring. Diet and Lifestyle: generally follows a low sodium diet. Labs: reviewed and discussed with patient. Prior to Admission medications    Medication Sig Start Date End Date Taking? Authorizing Provider   felodipine (PLENDIL SR) 5 mg 24 hr tablet Take 1 tablet by mouth  daily 8/31/17  Yes Solo Michaud MD   diazePAM (VALIUM) 5 mg tablet Take 0.5 Tabs by mouth nightly as needed for Sleep. Max Daily Amount: 2.5 mg. 8/31/17  Yes Solo Michaud MD   finasteride (PROSCAR) 5 mg tablet Take 5 mg by mouth daily. 8/15/15  Yes Historical Provider   aspirin 81 mg tablet Take 81 mg by mouth daily. Yes Historical Provider   multivitamins-minerals-lutein (CENTRUM SILVER) Tab Take 1 Tab by mouth two (2) times a week. Yes Historical Provider   lisinopril (PRINIVIL, ZESTRIL) 10 mg tablet Take 1 tablet by mouth  daily 8/31/17   Solo Michaud MD   RAPAFLO 8 mg capsule Take 8 mg by mouth every evening. 8/15/15   Historical Provider          No Known Allergies        Review of Systems   Constitutional: Negative for malaise/fatigue and weight loss. Respiratory: Negative for cough and shortness of breath. Cardiovascular: Negative for chest pain and palpitations. Objective:   Physical Exam   Constitutional: No distress. Cardiovascular: Regular rhythm and normal heart sounds. No murmur heard. Pulmonary/Chest: Effort normal and breath sounds normal. He has no wheezes. He has no rales. Musculoskeletal: He exhibits no edema. Psychiatric: He has a normal mood and affect. His behavior is normal.         Visit Vitals    /80    Pulse 70    Temp 98.1 °F (36.7 °C) (Oral)    Resp 14    Ht 5' 7.65\" (1.718 m)    Wt 155 lb (70.3 kg)    SpO2 96%    BMI 23.81 kg/m2         Disclaimer:  Advised him to call back or return to office if symptoms worsen/change/persist.  Discussed expected course/resolution/complications of diagnosis in detail with patient. Medication risks/benefits/costs/interactions/alternatives discussed with patient. He was given an after visit summary which includes diagnoses, current medications, & vitals. He expressed understanding with the diagnosis and plan.         Katy Arevalo MD

## 2017-10-02 NOTE — MR AVS SNAPSHOT
Visit Information Date & Time Provider Department Dept. Phone Encounter #  
 10/2/2017  4:00 PM Janet Mesa, [address] [facility] Internal Medicine [phone] [id number] Follow-up Instructions Return if symptoms worsen or fail to improve. Your Appointments 9/6/2018  9:30 AM  
Medicare Physical with Janet Mesa MD  
[address] Internal Medicine [address] [facility]) Appt Note: [address] [place] [id number]  
[address] [facility] Upcoming Health Maintenance Date Due INFLUENZA AGE 9 TO ADULT 8/1/2017 MEDICARE YEARLY EXAM 9/1/2018 GLAUCOMA SCREENING Q2Y 2/21/2019 DTaP/Tdap/Td series (2 - Td) 8/20/2025 Allergies as of 10/2/2017  Review Complete On: 10/2/2017 By: Janet Mesa MD  
 No Known Allergies Current Immunizations  Reviewed on 10/2/2017 Name Date Influenza High Dose Vaccine PF 10/2/2017, 11/9/2016 Influenza Vaccine 8/15/2015 Pneumococcal Conjugate (PCV-13) 8/20/2015 Pneumococcal Polysaccharide (PPSV-23) 8/18/2016 10:30 AM  
 Tdap 8/20/2015 Zoster Vaccine, Live 8/20/2015 Reviewed by Heather Neumann RN on 10/2/2017 at  4:06 PM  
You Were Diagnosed With   
  
 Codes Comments Essential hypertension    -  Primary ICD-10-CM: I10 
ICD-9-CM: 401.9 Encounter for immunization     ICD-10-CM: V49 ICD-9-CM: V03.89   
 ACP (advance care planning)     ICD-10-CM: Z71.89 ICD-9-CM: V65.49 Vitals BP Pulse Temp Resp Height(growth percentile) Weight(growth percentile) 132/80 70 98.1 °F (36.7 °C) (Oral) 14 5' 7.65\" (1.718 m) 155 lb (70.3 kg) SpO2 BMI Smoking Status 96% 23.81 kg/m2 Former Smoker BMI and BSA Data Body Mass Index Body Surface Area  
 23.81 kg/m 2 1.83 m 2 Preferred Pharmacy Pharmacy Name Phone [address] [facility], [address] [address] [place] [address] [address] Your Updated Medication List  
  
   
This list is accurate as of: 10/2/17  4:39 PM.  Always use your most recent med list.  
  
  
  
  
 aspirin 81 mg tablet Take 81 mg by mouth daily. CENTRUM SILVER Tab tablet Generic drug:  multivitamins-minerals-lutein Take 1 Tab by mouth two (2) times a week. diazePAM 5 mg tablet Commonly known as:  VALIUM Take 0.5 Tabs by mouth nightly as needed for Sleep. Max Daily Amount: 2.5 mg.  
  
 felodipine 5 mg 24 hr tablet Commonly known as:  PLENDIL SR Take 1 tablet by mouth  daily  
  
 finasteride 5 mg tablet Commonly known as:  PROSCAR Take 5 mg by mouth daily. RAPAFLO 8 mg capsule Generic drug:  silodosin Take 8 mg by mouth every evening. We Performed the Following ADMIN INFLUENZA VIRUS VAC [ [facility]] INFLUENZA VIRUS VACCINE, HIGH DOSE SEASONAL, PRESERVATIVE FREE [82516 CPT(R)] METABOLIC PANEL, BASIC [29914 CPT(R)] Follow-up Instructions Return if symptoms worsen or fail to improve. Patient Instructions Amanda Alexis 1721 What is a living will? A living will is a legal form you use to write down the kind of care you want at the end of your life. It is used by the health professionals who will treat you if you aren't able to decide for yourself. If you put your wishes in writing, your loved ones and others will know what kind of care you want. They won't need to guess. This can ease your mind and be helpful to others. A living will is not the same as an estate or property will. An estate will explains what you want to happen with your money and property after you die. Is a living will a legal document? A living will is a legal document. Each state has its own laws about living rae. If you move to another state, make sure that your living will is legal in the state where you now live.  Or you might use a universal form that has been approved by many states. This kind of form can sometimes be completed and stored online. Your electronic copy will then be available wherever you have a connection to the Internet. In most cases, doctors will respect your wishes even if you have a form from a different state. · You don't need an  to complete a living will. But legal advice can be helpful if your state's laws are unclear, your health history is complicated, or your family can't agree on what should be in your living will. · You can change your living will at any time. Some people find that their wishes about end-of-life care change as their health changes. · In addition to making a living will, think about completing a medical power of  form. This form lets you name the person you want to make end-of-life treatment decisions for you (your \"health care agent\") if you're not able to. Many hospitals and nursing homes will give you the forms you need to complete a living will and a medical power of . · Your living will is used only if you can't make or communicate decisions for yourself anymore. If you become able to make decisions again, you can accept or refuse any treatment, no matter what you wrote in your living will. · Your state may offer an online registry. This is a place where you can store your living will online so the doctors and nurses who need to treat you can find it right away. What should you think about when creating a living will? Talk about your end-of-life wishes with your family members and your doctor. Let them know what you want. That way the people making decisions for you won't be surprised by your choices. Think about these questions as you make your living will: · Do you know enough about life support methods that might be used?  If not, talk to your doctor so you know what might be done if you can't breathe on your own, your heart stops, or you're unable to swallow. · What things would you still want to be able to do after you receive life-support methods? Would you want to be able to walk? To speak? To eat on your own? To live without the help of machines? · If you have a choice, where do you want to be cared for? In your home? At a hospital or nursing home? · Do you want certain Hoahaoism practices performed if you become very ill? · If you have a choice at the end of your life, where would you prefer to die? At home? In a hospital or nursing home? Somewhere else? · Would you prefer to be buried or cremated? · Do you want your organs to be donated after you die? What should you do with your living will? · Make sure that your family members and your health care agent have copies of your living will. · Give your doctor a copy of your living will to keep in your medical record. If you have more than one doctor, make sure that each one has a copy. · You may want to put a copy of your living will where it can be easily found. Where can you learn more? Go to http://nara-caroline.info/. Enter G055 in the search box to learn more about \"Learning About Living Perroy. \" Current as of: August 8, 2016 Content Version: 11.3 © 1031-0094 "[employer], Inc.", Incorporated. Care instructions adapted under license by [employer] (which disclaims liability or warranty for this information). If you have questions about a medical condition or this instruction, always ask your healthcare professional. [place] [address] any warranty or liability for your use of this information. Introducing hospitals & HEALTH SERVICES! Dear Sylvester Duffy: Thank you for requesting a [employer] account. Our records indicate that you have previously registered for a [employer] account but its currently inactive. Please call our [employer] support line at [phone]. Additional Information If you have questions, please visit the Frequently Asked Questions section of the [employer] website at [email or web address] Remember, [employer] is NOT to be used for urgent needs. For medical emergencies, dial 911. Now available from your iPhone and Android! Please provide this summary of care documentation to your next provider. Your primary care clinician is listed as Lawanda Guzman. If you have any questions after today's visit, please call [phone].

## 2017-10-02 NOTE — PATIENT INSTRUCTIONS
Learning About Living Gabriela  What is a living will? A living will is a legal form you use to write down the kind of care you want at the end of your life. It is used by the health professionals who will treat you if you aren't able to decide for yourself. If you put your wishes in writing, your loved ones and others will know what kind of care you want. They won't need to guess. This can ease your mind and be helpful to others. A living will is not the same as an estate or property will. An estate will explains what you want to happen with your money and property after you die. Is a living will a legal document? A living will is a legal document. Each state has its own laws about living rae. If you move to another state, make sure that your living will is legal in the state where you now live. Or you might use a universal form that has been approved by many states. This kind of form can sometimes be completed and stored online. Your electronic copy will then be available wherever you have a connection to the Internet. In most cases, doctors will respect your wishes even if you have a form from a different state. · You don't need an  to complete a living will. But legal advice can be helpful if your state's laws are unclear, your health history is complicated, or your family can't agree on what should be in your living will. · You can change your living will at any time. Some people find that their wishes about end-of-life care change as their health changes. · In addition to making a living will, think about completing a medical power of  form. This form lets you name the person you want to make end-of-life treatment decisions for you (your \"health care agent\") if you're not able to. Many hospitals and nursing homes will give you the forms you need to complete a living will and a medical power of .   · Your living will is used only if you can't make or communicate decisions for yourself anymore. If you become able to make decisions again, you can accept or refuse any treatment, no matter what you wrote in your living will. · Your state may offer an online registry. This is a place where you can store your living will online so the doctors and nurses who need to treat you can find it right away. What should you think about when creating a living will? Talk about your end-of-life wishes with your family members and your doctor. Let them know what you want. That way the people making decisions for you won't be surprised by your choices. Think about these questions as you make your living will:  · Do you know enough about life support methods that might be used? If not, talk to your doctor so you know what might be done if you can't breathe on your own, your heart stops, or you're unable to swallow. · What things would you still want to be able to do after you receive life-support methods? Would you want to be able to walk? To speak? To eat on your own? To live without the help of machines? · If you have a choice, where do you want to be cared for? In your home? At a hospital or nursing home? · Do you want certain Jain practices performed if you become very ill? · If you have a choice at the end of your life, where would you prefer to die? At home? In a hospital or nursing home? Somewhere else? · Would you prefer to be buried or cremated? · Do you want your organs to be donated after you die? What should you do with your living will? · Make sure that your family members and your health care agent have copies of your living will. · Give your doctor a copy of your living will to keep in your medical record. If you have more than one doctor, make sure that each one has a copy. · You may want to put a copy of your living will where it can be easily found. Where can you learn more? Go to http://nara-caroline.info/.   Enter B848 in the search box to learn more about \"Learning About Living Perroy. \"  Current as of: August 8, 2016  Content Version: 11.3  © 8916-4788 [employer] Incorporated. Care instructions adapted under license by [employer] (which disclaims liability or warranty for this information). If you have questions about a medical condition or this instruction, always ask your healthcare professional. [address] any warranty or liability for your use of this information.

## 2017-10-02 NOTE — PROGRESS NOTES
BP and potassium follow up. Wants to address DNR status. Jose Begum is a 81 y.o. male  who present for routine immunizations. he  denies any symptoms , reactions or allergies that would exclude them from being immunized today. Risks and adverse reactions were discussed and the VIS was given to them. All questions were addressed. he was observed for 5 min post injection. There were no reactions observed.     Brooklyn Maria RN

## 2017-10-03 LAB
BUN SERPL-MCNC: 13 MG/DL (ref 8–27)
BUN/CREAT SERPL: 16 (ref 10–24)
CALCIUM SERPL-MCNC: 9.3 MG/DL (ref 8.6–10.2)
CHLORIDE SERPL-SCNC: 103 MMOL/L (ref 96–106)
CO2 SERPL-SCNC: 24 MMOL/L (ref 18–29)
CREAT SERPL-MCNC: 0.83 MG/DL (ref 0.76–1.27)
GLUCOSE SERPL-MCNC: 91 MG/DL (ref 65–99)
POTASSIUM SERPL-SCNC: 4.7 MMOL/L (ref 3.5–5.2)
SODIUM SERPL-SCNC: 144 MMOL/L (ref 134–144)

## 2017-10-03 NOTE — PROGRESS NOTES
Letter sent to patient. All labs are stable or at goal for him.   K improved, still ULN, will remain off acei

## 2017-10-03 NOTE — ACP (ADVANCE CARE PLANNING)
Advance Care Planning (ACP) Provider Note - Comprehensive     Date of ACP Conversation: 10/02/17  Persons included in Conversation:  patient  Length of ACP Conversation in minutes: <16 minutes (Non-Billable)    Authorized Decision Maker (if patient is incapable of making informed decisions): This person is: Healthcare Agent/Medical Power of  under Advance Directive          General ACP for ALL Patients with Decision Making Capacity:  Importance of advance care planning, including choosing a healthcare agent to communicate patient's healthcare decisions if patient lost the ability to make decisions, such as after a sudden illness or accident  Understanding of the healthcare agent role was assessed and information provided  Opportunity offered to explore how cultural, Jew, spiritual, or personal beliefs would affect decisions for future care  Exploration of values, goals, and preferences if recovery is not expected, even with continued medical treatment in the event of: Imminent death or severe, permanent brain injury    For Serious or Chronic Illness:  Understanding of medical condition    Understanding of CPR, goals and expected outcomes, benefits and burdens discussed. Understanding of medical condition  Information on CPR success rates provided (e.g. for CPR in hospital, survival to d/c at two weeks is 22%, for chronically ill or elderly/frail survival is less than 3%)    Interventions Provided:  Reviewed existing Advance Directive   Recommended communicating the plan and making copies for the healthcare agent, personal physician, and others as appropriate (e.g., health system)  Recommended review of completed ACP document annually or upon change in health status       He has an advanced directive - a copy has been provided & still reflects him wishes. Since last visit patient has been thinking about Full Code vs DNR.   We reviewed DNR/DNI and patient is interested- forms filled out & order placed.

## 2018-01-12 DIAGNOSIS — F51.01 PRIMARY INSOMNIA: ICD-10-CM

## 2018-01-15 RX ORDER — DIAZEPAM 5 MG/1
2.5 TABLET ORAL
Qty: 45 TAB | Refills: 1 | OUTPATIENT
Start: 2018-01-15 | End: 2019-02-26

## 2018-01-29 DIAGNOSIS — I10 ESSENTIAL HYPERTENSION: ICD-10-CM

## 2018-01-29 RX ORDER — FELODIPINE 5 MG/1
TABLET, EXTENDED RELEASE ORAL
Qty: 90 TAB | Refills: 2 | Status: SHIPPED | COMMUNITY
Start: 2018-01-29 | End: 2018-07-27 | Stop reason: SDUPTHER

## 2018-03-27 ENCOUNTER — OFFICE VISIT (OUTPATIENT)
Dept: INTERNAL MEDICINE CLINIC | Age: 83
End: 2018-03-27

## 2018-03-27 VITALS
RESPIRATION RATE: 14 BRPM | TEMPERATURE: 97.8 F | OXYGEN SATURATION: 98 % | HEART RATE: 70 BPM | DIASTOLIC BLOOD PRESSURE: 78 MMHG | SYSTOLIC BLOOD PRESSURE: 146 MMHG | WEIGHT: 163 LBS | HEIGHT: 68 IN | BODY MASS INDEX: 24.71 KG/M2

## 2018-03-27 DIAGNOSIS — F51.01 PRIMARY INSOMNIA: ICD-10-CM

## 2018-03-27 DIAGNOSIS — R10.12 LUQ ABDOMINAL PAIN: Primary | ICD-10-CM

## 2018-03-27 NOTE — PATIENT INSTRUCTIONS
Abdominal Pain: Care Instructions  Your Care Instructions    Abdominal pain has many possible causes. Some aren't serious and get better on their own in a few days. Others need more testing and treatment. If your pain continues or gets worse, you need to be rechecked and may need more tests to find out what is wrong. You may need surgery to correct the problem. Don't ignore new symptoms, such as fever, nausea and vomiting, urination problems, pain that gets worse, and dizziness. These may be signs of a more serious problem. Your doctor may have recommended a follow-up visit in the next 8 to 12 hours. If you are not getting better, you may need more tests or treatment. The doctor has checked you carefully, but problems can develop later. If you notice any problems or new symptoms, get medical treatment right away. Follow-up care is a key part of your treatment and safety. Be sure to make and go to all appointments, and call your doctor if you are having problems. It's also a good idea to know your test results and keep a list of the medicines you take. How can you care for yourself at home? · Rest until you feel better. · To prevent dehydration, drink plenty of fluids, enough so that your urine is light yellow or clear like water. Choose water and other caffeine-free clear liquids until you feel better. If you have kidney, heart, or liver disease and have to limit fluids, talk with your doctor before you increase the amount of fluids you drink. · If your stomach is upset, eat mild foods, such as rice, dry toast or crackers, bananas, and applesauce. Try eating several small meals instead of two or three large ones. · Wait until 48 hours after all symptoms have gone away before you have spicy foods, alcohol, and drinks that contain caffeine. · Do not eat foods that are high in fat. · Avoid anti-inflammatory medicines such as aspirin, ibuprofen (Advil, Motrin), and naproxen (Aleve).  These can cause stomach upset. Talk to your doctor if you take daily aspirin for another health problem. When should you call for help? Call 911 anytime you think you may need emergency care. For example, call if:  ? · You passed out (lost consciousness). ? · You pass maroon or very bloody stools. ? · You vomit blood or what looks like coffee grounds. ? · You have new, severe belly pain. ?Call your doctor now or seek immediate medical care if:  ? · Your pain gets worse, especially if it becomes focused in one area of your belly. ? · You have a new or higher fever. ? · Your stools are black and look like tar, or they have streaks of blood. ? · You have unexpected vaginal bleeding. ? · You have symptoms of a urinary tract infection. These may include:  ¨ Pain when you urinate. ¨ Urinating more often than usual.  ¨ Blood in your urine. ? · You are dizzy or lightheaded, or you feel like you may faint. ? Watch closely for changes in your health, and be sure to contact your doctor if:  ? · You are not getting better after 1 day (24 hours). Where can you learn more? Go to http://nara-caroline.info/. Enter A464 in the search box to learn more about \"Abdominal Pain: Care Instructions. \"  Current as of: March 20, 2017  Content Version: 11.4  © 5045-1173 [employer]. Care instructions adapted under license by [employer] (which disclaims liability or warranty for this information). If you have questions about a medical condition or this instruction, always ask your healthcare professional. [place] [address] any warranty or liability for your use of this information.

## 2018-03-27 NOTE — PROGRESS NOTES
Left side pain, intermittent. None since yesterday.   Stopped Rapaflo a year ago, restarted briefly but no longer taking

## 2018-03-27 NOTE — PROGRESS NOTES
Alanis Villalta is a 81 y.o. male who was seen in clinic today (3/27/2018). Assessment & Plan:   Diagnoses and all orders for this visit:    1. LUQ abdominal pain- this is a new problem, symptoms are: resolved currently, differential dx reviewed with the patient, exact etiology is unclear at this time. Will monitor as he is asymptomatic, red flags reviewed w/ him. 2. Primary insomnia- as he was leaving asking about medications, reviewed alternative meds, reviewed pros/cons to meds, reviewed concerns about long term sleep meds, reviewed sleep deprivation. Will cont w/ current meds, reviewed using 1/2 tab twice a night as his biggest issue is going back to bed after getting up to use BR. Follow-up Disposition:  Return if symptoms worsen or fail to improve. Subjective:   Dre Cam was seen today for Side Pain    Abdominal Pain  Alanis Villalta is here to talk about abdominal pain. This is a new problem and symptoms began 2 weeks ago and have fluctuating until yesterday when it resolved. Pain is LUQ/flank pain in  location and described as sharp. They would last for a few seconds and then resolve. No radiation. He also reports the following symptoms: nothing. He symptoms are aggravated by nothing. He has tried nothing. Labs from August '17 reviewed. No imaging. Prior to Admission medications    Medication Sig Start Date End Date Taking? Authorizing Provider   felodipine (PLENDIL SR) 5 mg 24 hr tablet Take 1 tablet by mouth  daily 1/29/18  Yes Diya Pace MD   finasteride (PROSCAR) 5 mg tablet Take 5 mg by mouth daily. 8/15/15  Yes Historical Provider   aspirin 81 mg tablet Take 81 mg by mouth daily. Yes Historical Provider   multivitamins-minerals-lutein (CENTRUM SILVER) Tab Take 1 Tab by mouth two (2) times a week. Yes Historical Provider   diazePAM (VALIUM) 5 mg tablet Take 0.5 Tabs by mouth nightly as needed for Sleep.  Max Daily Amount: 2.5 mg. 1/15/18 Olga Sotelo MD   RAPAFLO 8 mg capsule Take 8 mg by mouth every evening. 8/15/15   Historical Provider          No Known Allergies        ROS - per HPI        Objective:   Physical Exam   Constitutional: No distress. Eyes: Conjunctivae are normal. No scleral icterus. Cardiovascular: Regular rhythm and normal heart sounds. No murmur heard. Pulmonary/Chest: Effort normal and breath sounds normal. He has no wheezes. He has no rales. Abdominal: Soft. Bowel sounds are normal. He exhibits no mass. There is no hepatosplenomegaly. There is no tenderness. Skin: No rash noted. Visit Vitals    /78    Pulse 70    Temp 97.8 °F (36.6 °C) (Oral)    Resp 14    Ht 5' 7.65\" (1.718 m)    Wt 163 lb (73.9 kg)    SpO2 98%    BMI 25.04 kg/m2         Disclaimer:  Advised him to call back or return to office if symptoms worsen/change/persist.  Discussed expected course/resolution/complications of diagnosis in detail with patient. Medication risks/benefits/costs/interactions/alternatives discussed with patient. He was given an after visit summary which includes diagnoses, current medications, & vitals. He expressed understanding with the diagnosis and plan. Aspects of this note may have been generated using voice recognition software. Despite editing, there may be some syntax errors.        Olga Sotelo MD

## 2018-03-27 NOTE — MR AVS SNAPSHOT
[address] [facility] [address] 
[phone] Patient: Alexia Sheehan MRN: [id number]/6/[id number] Visit Information Date & Time Provider Department Dept. Phone Encounter #  
 3/27/2018  3:00 PM Meagan Roper, [facility] [phone] [id number] Follow-up Instructions Return if symptoms worsen or fail to improve. Your Appointments 9/6/2018  9:30 AM  
Medicare Physical with Meagan Roper MD  
[facility]) Appt Note: [address] [facility] [address] [facility] [address]  
[address] [address] [address] Upcoming Health Maintenance Date Due  
 MEDICARE YEARLY EXAM 9/1/2018 GLAUCOMA SCREENING Q2Y 2/21/2019 DTaP/Tdap/Td series (2 - Td) 8/20/2025 Allergies as of 3/27/2018  Review Complete On: 3/27/2018 By: Meagan Roper MD  
 No Known Allergies Current Immunizations  Reviewed on 3/27/2018 Name Date Influenza High Dose Vaccine PF 10/2/2017, 11/9/2016 Influenza Vaccine 8/15/2015 Pneumococcal Conjugate (PCV-13) 8/20/2015 Pneumococcal Polysaccharide (PPSV-23) 8/18/2016 10:30 AM  
 Tdap 8/20/2015 Zoster Vaccine, Live 8/20/2015 Reviewed by Ching Mahajan RN on 3/27/2018 at  3:09 PM  
You Were Diagnosed With   
  
 Codes Comments LUQ abdominal pain    -  Primary ICD-10-CM: R10.12 ICD-9-CM: 789.02 Vitals BP Pulse Temp Resp Height(growth percentile) Weight(growth percentile) 146/78 70 97.8 °F (36.6 °C) (Oral) 14 5' 7.65\" (1.718 m) 163 lb (73.9 kg) SpO2 BMI Smoking Status 98% 25.04 kg/m2 Former Smoker Vitals History BMI and BSA Data Body Mass Index Body Surface Area 25.04 kg/m 2 1.88 m 2 Preferred Pharmacy Pharmacy Name Phone  [address], [address] [facility] [address] [place] [address] [phone] Your Updated Medication List  
  
   
This list is accurate as of 3/27/18  3:42 PM.  Always use your most recent med list.  
  
  
  
  
 aspirin 81 mg tablet Take 81 mg by mouth daily. CENTRUM SILVER Tab tablet Generic drug:  multivitamins-minerals-lutein Take 1 Tab by mouth two (2) times a week. diazePAM 5 mg tablet Commonly known as:  VALIUM Take 0.5 Tabs by mouth nightly as needed for Sleep. Max Daily Amount: 2.5 mg.  
  
 felodipine 5 mg 24 hr tablet Commonly known as:  PLENDIL SR Take 1 tablet by mouth  daily  
  
 finasteride 5 mg tablet Commonly known as:  PROSCAR Take 5 mg by mouth daily. Follow-up Instructions Return if symptoms worsen or fail to improve. Patient Instructions Abdominal Pain: Care Instructions Your Care Instructions Abdominal pain has many possible causes. Some aren't serious and get better on their own in a few days. Others need more testing and treatment. If your pain continues or gets worse, you need to be rechecked and may need more tests to find out what is wrong. You may need surgery to correct the problem. Don't ignore new symptoms, such as fever, nausea and vomiting, urination problems, pain that gets worse, and dizziness. These may be signs of a more serious problem. Your doctor may have recommended a follow-up visit in the next 8 to 12 hours. If you are not getting better, you may need more tests or treatment. The doctor has checked you carefully, but problems can develop later. If you notice any problems or new symptoms, get medical treatment right away. Follow-up care is a key part of your treatment and safety. Be sure to make and go to all appointments, and call your doctor if you are having problems. It's also a good idea to know your test results and keep a list of the medicines you take. How can you care for yourself at home? · Rest until you feel better. · To prevent dehydration, drink plenty of fluids, enough so that your urine is light yellow or clear like water. Choose water and other caffeine-free clear liquids until you feel better. If you have kidney, heart, or liver disease and have to limit fluids, talk with your doctor before you increase the amount of fluids you drink. · If your stomach is upset, eat mild foods, such as rice, dry toast or crackers, bananas, and applesauce. Try eating several small meals instead of two or three large ones. · Wait until 48 hours after all symptoms have gone away before you have spicy foods, alcohol, and drinks that contain caffeine. · Do not eat foods that are high in fat. · Avoid anti-inflammatory medicines such as aspirin, ibuprofen (Advil, Motrin), and naproxen (Aleve). These can cause stomach upset. Talk to your doctor if you take daily aspirin for another health problem. When should you call for help? Call 911 anytime you think you may need emergency care. For example, call if: 
? · You passed out (lost consciousness). ? · You pass maroon or very bloody stools. ? · You vomit blood or what looks like coffee grounds. ? · You have new, severe belly pain. ?Call your doctor now or seek immediate medical care if: 
? · Your pain gets worse, especially if it becomes focused in one area of your belly. ? · You have a new or higher fever. ? · Your stools are black and look like tar, or they have streaks of blood. ? · You have unexpected vaginal bleeding. ? · You have symptoms of a urinary tract infection. These may include: 
¨ Pain when you urinate. ¨ Urinating more often than usual. 
¨ Blood in your urine. ? · You are dizzy or lightheaded, or you feel like you may faint. ? Watch closely for changes in your health, and be sure to contact your doctor if: 
? · You are not getting better after 1 day (24 hours). Where can you learn more? Go to http://marianne.info/. Enter V433 in the search box to learn more about \"Abdominal Pain: Care Instructions. \" Current as of: March 20, 2017 Content Version: 11.4 © 5113-4055 [employer] (acquired by [employer] in 2014). Care instructions adapted under license by [employer] (which disclaims liability or warranty for this information). If you have questions about a medical condition or this instruction, always ask your healthcare professional. [address] any warranty or liability for your use of this information. Introducing [facility] & HEALTH SERVICES! Dear Dre Cam: Thank you for requesting a Authorly account. Our records indicate that you have previously registered for a Authorly account but its currently inactive. Please call our Authorly support line at [phone]. Additional Information If you have questions, please visit the Frequently Asked Questions section of the Authorly website at [email or web address] Remember, Authorly is NOT to be used for urgent needs. For medical emergencies, dial 911. Now available from your iPhone and Android! Please provide this summary of care documentation to your next provider. Your primary care clinician is listed as Abby Beth. If you have any questions after today's visit, please call [phone].

## 2018-06-29 ENCOUNTER — TELEPHONE (OUTPATIENT)
Dept: INTERNAL MEDICINE CLINIC | Age: 83
End: 2018-06-29

## 2018-06-29 NOTE — TELEPHONE ENCOUNTER
Pt called back again this afternoon to report his most recent b/p  and stated his b/p was 161/79. But has no c/o headache, arm pain, chest pain, or sob. Pt states his b/p has been running around the 404'B-765'D systolic for the past week. Pt is scheduled to see Dr Calderon Police for b/p check on Monday.

## 2018-06-29 NOTE — TELEPHONE ENCOUNTER
Pt calling stating his b/p is high at the moment he called. Pt states it was 174/127 but pt stated he had been taking his b/p several times. Asked the pt what was his b/p the first time he took it this morning and pt stated it was 80/70. Asked the pt then is he having any symptoms and pt denied any symptoms stated he feels fine. Informed the pt to remain calm and take b/p again this afternoon at 2 pm and call our office back to report his b/p. Pt verbalized understanding.

## 2018-07-02 ENCOUNTER — OFFICE VISIT (OUTPATIENT)
Dept: INTERNAL MEDICINE CLINIC | Age: 83
End: 2018-07-02

## 2018-07-02 VITALS
TEMPERATURE: 98 F | DIASTOLIC BLOOD PRESSURE: 72 MMHG | OXYGEN SATURATION: 98 % | HEIGHT: 68 IN | HEART RATE: 84 BPM | WEIGHT: 161.6 LBS | RESPIRATION RATE: 16 BRPM | SYSTOLIC BLOOD PRESSURE: 150 MMHG | BODY MASS INDEX: 24.49 KG/M2

## 2018-07-02 DIAGNOSIS — I10 ESSENTIAL HYPERTENSION: Primary | ICD-10-CM

## 2018-07-02 RX ORDER — ALFUZOSIN HYDROCHLORIDE 10 MG/1
1 TABLET, EXTENDED RELEASE ORAL DAILY
COMMUNITY
Start: 2018-06-16 | End: 2018-07-27

## 2018-07-02 NOTE — PROGRESS NOTES
HISTORY OF PRESENT ILLNESS  Malissa Middleton is a 81 y.o. male. HPI Patient presents for a blood pressure check. blood pressure has been running high. 160's/70's at Urologist, and home 161/79 on 6/29, 164/81 on 6/30, 179/94 168/85 7/1. Patient denies medication changes or non compliance. He denies weight change or dietary changes. Patient denies headache, chest pain, dizziness or shortness of breath. Past Medical History:   Diagnosis Date    BPH (benign prostatic hypertrophy)     Cataract, bilateral 04/06/2016    s/p surgery in 2016    Hypertension     Insomnia 2/11/2015    Shingles       Current Outpatient Prescriptions on File Prior to Visit   Medication Sig Dispense Refill    felodipine (PLENDIL SR) 5 mg 24 hr tablet Take 1 tablet by mouth  daily 90 Tab 2    diazePAM (VALIUM) 5 mg tablet Take 0.5 Tabs by mouth nightly as needed for Sleep. Max Daily Amount: 2.5 mg. 45 Tab 1    finasteride (PROSCAR) 5 mg tablet Take 5 mg by mouth daily.  aspirin 81 mg tablet Take 81 mg by mouth daily.  multivitamins-minerals-lutein (CENTRUM SILVER) Tab Take 1 Tab by mouth two (2) times a week. No current facility-administered medications on file prior to visit. No Known Allergies  Social History     Social History    Marital status:      Spouse name: N/A    Number of children: N/A    Years of education: N/A     Occupational History    Not on file.      Social History Main Topics    Smoking status: Former Smoker    Smokeless tobacco: Never Used      Comment: quit smoking cigarettes/pipe - age in 30's    Alcohol use 3.5 oz/week     7 Glasses of wine per week    Drug use: No    Sexual activity: Not Currently     Partners: Female     Other Topics Concern    Not on file     Social History Narrative         ROS    Physical Exam  Visit Vitals    /72 (BP 1 Location: Right arm, BP Patient Position: Sitting)  Comment: left 150/74    Pulse 84    Temp 98 °F (36.7 °C) (Oral)    Resp 16    Ht 5' 7.6\" (1.717 m)    Wt 161 lb 9.6 oz (73.3 kg)    SpO2 98%    BMI 24.86 kg/m2   Repeat blood pressure both arms unchanged  Heart[de-identified] normal rate, regular rhythm, normal S1, S2, no murmurs, rubs, clicks or gallops. Chest: clear to auscultation, no wheezes, rales or rhonchi,   Extremities: no edema    ASSESSMENT and PLAN  Hypertension   Patient has had elevated readings at home. Prior to increasing his medication, will first have him return for nurse visit. He will bring in home monitor, compare readings with office to verify accuracy of home readings. He will continue to check his blood pressure readings over the next few days.

## 2018-07-02 NOTE — MR AVS SNAPSHOT
[address] [facility] [address] [place] [address] 
[phone] Patient: Bakari Schrader MRN: [id number]:7/2/[birth year]72 Visit Information Date & Time Provider Department Dept. Phone Encounter #  
 7/2/2018 10:15 AM Fred Fitzpatrick, [address] [facility] [phone] [id number] Follow-up Instructions Return in about 3 days (around 7/5/2018), or Nurse visit for BP. Your Appointments 9/6/2018  9:30 AM  
Medicare Physical with Shadia Paz MD  
[facility] [address] [facility]) Appt Note: [id number] [facility] [address] [facility] [address] [address] [place] [address] [address] Upcoming Health Maintenance Date Due Influenza Age 5 to Adult 8/1/2018 MEDICARE YEARLY EXAM 9/1/2018 GLAUCOMA SCREENING Q2Y 2/21/2019 DTaP/Tdap/Td series (2 - Td) 8/20/2025 Allergies as of 7/2/2018  Review Complete On: 7/2/2018 By: Johana Chamberlain LPN No Known Allergies Current Immunizations  Reviewed on 3/27/2018 Name Date Influenza High Dose Vaccine PF 10/2/2017, 11/9/2016 Influenza Vaccine 8/15/2015 Pneumococcal Conjugate (PCV-13) 8/20/2015 Pneumococcal Polysaccharide (PPSV-23) 8/18/2016 10:30 AM  
 Tdap 8/20/2015 Zoster Vaccine, Live 8/20/2015 Not reviewed this visit Vitals BP Pulse Temp Resp Height(growth percentile) Weight(growth percentile) 150/72 (BP 1 Location: Right arm, BP Patient Position: Sitting) 84 98 °F (36.7 °C) (Oral) 16 5' 7.6\" (1.717 m) 161 lb 9.6 oz (73.3 kg) SpO2 BMI Smoking Status 98% 24.86 kg/m2 Former Smoker BMI and BSA Data Body Mass Index Body Surface Area  
 24.86 kg/m 2 1.87 m 2 Preferred Pharmacy Pharmacy Name Phone [address] [facility] [address] [place] [address] [phone] Your Updated Medication List  
  
   
 This list is accurate as of 7/2/18 10:22 AM.  Always use your most recent med list.  
  
  
  
  
 alfuzosin SR 10 mg SR tablet Commonly known as:  Janae Postin Take 1 Tab by mouth daily. aspirin 81 mg tablet Take 81 mg by mouth daily. CENTRUM SILVER Tab tablet Generic drug:  multivitamins-minerals-lutein Take 1 Tab by mouth two (2) times a week. diazePAM 5 mg tablet Commonly known as:  VALIUM Take 0.5 Tabs by mouth nightly as needed for Sleep. Max Daily Amount: 2.5 mg.  
  
 felodipine 5 mg 24 hr tablet Commonly known as:  PLENDIL SR Take 1 tablet by mouth  daily  
  
 finasteride 5 mg tablet Commonly known as:  PROSCAR Take 5 mg by mouth daily. Follow-up Instructions Return in about 3 days (around 7/5/2018), or Nurse visit for BP. Patient Instructions Continue to monitor blood pressure and bring in home monitor for comparison reading with office blood pressure readings later this week. Call or return to clinic if these symptoms worsen or fail to improve as anticipated. Introducing [facility] & HEALTH SERVICES! Saadia Oleary introduces Awareness Card patient portal. Now you can access parts of your medical record, email your doctor's office, and request medication refills online. 1. In your internet browser, go to [email or web address] 2. Click on the First Time User? Click Here link in the Sign In box. You will see the New Member Sign Up page. 3. Enter your Awareness Card Access Code exactly as it appears below. You will not need to use this code after youve completed the sign-up process. If you do not sign up before the expiration date, you must request a new code. · Awareness Card Access Code: [email or web address] Expires: 9/30/2018 10:00 AM 
 
4. Enter the last four digits of your Social Security Number (xxxx) and Date of Birth (mm/dd/yyyy) as indicated and click Submit. You will be taken to the next sign-up page. 5. Create a CSID ID. This will be your CSID login ID and cannot be changed, so think of one that is secure and easy to remember. 6. Create a CSID password. You can change your password at any time. 7. Enter your Password Reset Question and Answer. This can be used at a later time if you forget your password. 8. Enter your e-mail address. You will receive e-mail notification when new information is available in [address] 9. Click Sign Up. You can now view and download portions of your medical record. 10. Click the Download Summary menu link to download a portable copy of your medical information. If you have questions, please visit the Frequently Asked Questions section of the CSID website. Remember, CSID is NOT to be used for urgent needs. For medical emergencies, dial 911. Now available from your iPhone and Android! Please provide this summary of care documentation to your next provider. Your primary care clinician is listed as Caleb Sesay. If you have any questions after today's visit, please call [phone].

## 2018-07-05 ENCOUNTER — CLINICAL SUPPORT (OUTPATIENT)
Dept: INTERNAL MEDICINE CLINIC | Age: 83
End: 2018-07-05

## 2018-07-05 VITALS — OXYGEN SATURATION: 97 % | SYSTOLIC BLOOD PRESSURE: 134 MMHG | DIASTOLIC BLOOD PRESSURE: 64 MMHG | HEART RATE: 76 BPM

## 2018-07-05 DIAGNOSIS — I10 ESSENTIAL HYPERTENSION: Primary | ICD-10-CM

## 2018-07-05 NOTE — PROGRESS NOTES
Went to urologist and BP was elevated. Has been monitoring at home and has been running 160-179/ 80-94. Saw Dr. Georgana Halsted last week who suggested bringing in home cuff and comparing to readings in the office. Here to check home cuff to check in the office. Home cuff left arm 142/69. Here 134/64 left arm. Home cuff right arm 149/73, here right arm 132 62 . Told him I would let him know Dr. Mariela Montes' thoughts on variances.

## 2018-07-05 NOTE — PROGRESS NOTES
Notes reviewed. BP cuff high. At goal in office. Recommend new BP cuff. Continue to check 2-3 times per week and f/u with me in 2-4 wks. Was on acei but stopped 6-9 months ago.   May need to restart an additional BP medication

## 2018-07-27 ENCOUNTER — OFFICE VISIT (OUTPATIENT)
Dept: INTERNAL MEDICINE CLINIC | Age: 83
End: 2018-07-27

## 2018-07-27 VITALS
TEMPERATURE: 97.9 F | SYSTOLIC BLOOD PRESSURE: 144 MMHG | BODY MASS INDEX: 24.86 KG/M2 | HEIGHT: 68 IN | WEIGHT: 164 LBS | HEART RATE: 60 BPM | DIASTOLIC BLOOD PRESSURE: 78 MMHG | RESPIRATION RATE: 16 BRPM | OXYGEN SATURATION: 96 %

## 2018-07-27 DIAGNOSIS — R35.1 BPH ASSOCIATED WITH NOCTURIA: ICD-10-CM

## 2018-07-27 DIAGNOSIS — I10 ESSENTIAL HYPERTENSION: Primary | ICD-10-CM

## 2018-07-27 DIAGNOSIS — F51.01 PRIMARY INSOMNIA: ICD-10-CM

## 2018-07-27 DIAGNOSIS — N40.1 BPH ASSOCIATED WITH NOCTURIA: ICD-10-CM

## 2018-07-27 RX ORDER — FELODIPINE 10 MG/1
TABLET, EXTENDED RELEASE ORAL
Qty: 90 TAB | Refills: 1 | Status: SHIPPED | OUTPATIENT
Start: 2018-07-27 | End: 2019-02-15 | Stop reason: SDUPTHER

## 2018-07-27 NOTE — PROGRESS NOTES
Follow up. Warner Chavez about a month ago, right foot/ heel pain, seen at [facility], had Xray, wearing a boot. To see orthopedic Dr. Sylvia Field in a week. BP at home 167/80.

## 2018-07-27 NOTE — PROGRESS NOTES
Fidelina Deleon is a 81 y.o. male who was seen in clinic today (7/27/2018). Assessment & Plan:   Diagnoses and all orders for this visit:    1. Essential hypertension -not ideally controlled, reviewed ideal BP goals & med options, will avoid acei due to elevated K, will increase CCB from 5mg to 10mg  -     felodipine (PLENDIL SR) 10 mg 24 hr tablet; Take 1 tablet by mouth  daily    2. BPH associated with nocturia- uncontrolled, nocturia 5-6 times per night, defer to specialist     3. Primary insomnia- still an issue, had a long d/w patient regarding this and it sounds like it is all related to BPH. He wants a medication to take at 3-4am if he can't get back to bed. Attempted to explain this does not exist.  He was asking about restarting Valium. Reviewed side effects & expectations. Recommended Melatonin or Trazodone more then hyponoic or benzo. Follow-up Disposition:  Return in about 1 month (around 8/27/2018) for Blood pressure check. Subjective:   Kash Bergman was seen today for Hypertension    Cardiovascular Review  The patient has hypertension. Since last visit: he has RTC twice due to concerns about BP. Note from Dr Cronin Miss reviewed and NV for BP check reviewed. He reports taking medications as instructed, no medication side effects noted, home BP monitoring in range of 053-624'G systolic over 23'R diastolic. Diet and Lifestyle: generally follows a low sodium diet. Labs: reviewed and discussed with patient. Brief Labs:     Lab Results   Component Value Date/Time    Sodium 144 10/02/2017 04:54 PM    Potassium 4.7 10/02/2017 04:54 PM    Creatinine 0.83 10/02/2017 04:54 PM          Prior to Admission medications    Medication Sig Start Date End Date Taking? Authorizing Provider   silodosin (RAPAFLO PO) Take  by mouth daily.    Yes Historical Provider   felodipine (PLENDIL SR) 5 mg 24 hr tablet Take 1 tablet by mouth  daily 1/29/18  Yes Avery Narvaez MD   finasteride (PROSCAR) 5 mg tablet Take 5 mg by mouth daily. 8/15/15  Yes Historical Provider   aspirin 81 mg tablet Take 81 mg by mouth daily. Yes Historical Provider   multivitamins-minerals-lutein (CENTRUM SILVER) Tab Take 1 Tab by mouth two (2) times a week. Yes Historical Provider   diazePAM (VALIUM) 5 mg tablet Take 0.5 Tabs by mouth nightly as needed for Sleep. Max Daily Amount: 2.5 mg. 1/15/18   Jeannette Null MD          No Known Allergies        Review of Systems   Constitutional: Negative for malaise/fatigue and weight loss. Respiratory: Negative for cough and shortness of breath. Cardiovascular: Positive for leg swelling. Negative for chest pain and palpitations. Gastrointestinal: Negative for abdominal pain, constipation, diarrhea, nausea and vomiting. Genitourinary: Positive for frequency and urgency. Neurological: Negative for dizziness and headaches. Objective:   Physical Exam   Constitutional: No distress. Cardiovascular: Regular rhythm and normal heart sounds. Bradycardia present. No murmur heard. Pulmonary/Chest: Effort normal and breath sounds normal. He has no wheezes. He has no rales. Abdominal: Soft. Bowel sounds are normal. He exhibits no mass. There is no hepatosplenomegaly. There is no tenderness. Musculoskeletal: He exhibits edema (trace in R ankle). Visit Vitals    /78    Pulse 60    Temp 97.9 °F (36.6 °C) (Oral)    Resp 16    Ht 5' 7.6\" (1.717 m)    Wt 164 lb (74.4 kg)    SpO2 96%    BMI 25.23 kg/m2         Disclaimer:  Advised him to call back or return to office if symptoms worsen/change/persist.  Discussed expected course/resolution/complications of diagnosis in detail with patient. Medication risks/benefits/costs/interactions/alternatives discussed with patient. He was given an after visit summary which includes diagnoses, current medications, & vitals. He expressed understanding with the diagnosis and plan.       Aspects of this note may have been generated using voice recognition software. Despite editing, there may be some syntax errors.        Shadia Paz MD

## 2018-07-27 NOTE — MR AVS SNAPSHOT
[address] [facility] [address] [place] [address] 
[phone] Patient: Debbi Knott MRN: [id number] TQA:9/4/4107 Visit Information Date & Time Provider Department Dept. Phone Encounter #  
 7/27/2018 10:15 AM Michele Booth, [address] [place] [address] Internal Medicine [phone] [id number] Follow-up Instructions Return in about 1 month (around 8/27/2018) for Blood pressure check. Your Appointments 11/27/2018  2:30 PM  
Medicare Physical with Michele Booth MD  
[facility] Internal Medicine [address]) Appt Note: CPE; .  
 [address] [facility] [address] [facility] [address]  
[address] [address] [place] [address] Upcoming Health Maintenance Date Due Influenza Age 5 to Adult 8/1/2018 GLAUCOMA SCREENING Q2Y 2/21/2019 DTaP/Tdap/Td series (2 - Td) 8/20/2025 Allergies as of 7/27/2018  Review Complete On: 7/27/2018 By: Michele Booth MD  
 No Known Allergies Current Immunizations  Reviewed on 7/27/2018 Name Date Influenza High Dose Vaccine PF 10/2/2017, 11/9/2016 Influenza Vaccine 8/15/2015 Pneumococcal Conjugate (PCV-13) 8/20/2015 Pneumococcal Polysaccharide (PPSV-23) 8/18/2016 10:30 AM  
 Tdap 8/20/2015 Zoster Vaccine, Live 8/20/2015 Reviewed by Virginia Patterson RN on 7/27/2018 at 10:21 AM  
 Reviewed by Virginia Patterson RN on 7/27/2018 at 10:21 AM  
You Were Diagnosed With   
  
 Codes Comments Essential hypertension    -  Primary ICD-10-CM: I10 
ICD-9-CM: 401.9 BPH associated with nocturia     ICD-10-CM: N40.1, R35.1 ICD-9-CM: 600.01, 788.43 Primary insomnia     ICD-10-CM: F51.01 
ICD-9-CM: 307.42 Vitals BP Pulse Temp Resp Height(growth percentile) Weight(growth percentile) 144/78 60 97.9 °F (36.6 °C) (Oral) 16 5' 7.6\" (1.717 m) 164 lb (74.4 kg) SpO2 BMI Smoking Status 96% 25.23 kg/m2 Former Smoker BMI and BSA Data Body Mass Index Body Surface Area  
 25.23 kg/m 2 1.88 m 2 Preferred Pharmacy Pharmacy Name Phone [facility] [place] [facility] [address] [facility] [address] [phone] Your Updated Medication List  
  
   
This list is accurate as of 7/27/18 10:55 AM.  Always use your most recent med list.  
  
  
  
  
 aspirin 81 mg tablet Take 81 mg by mouth daily. CENTRUM SILVER Tab tablet Generic drug:  multivitamins-minerals-lutein Take 1 Tab by mouth two (2) times a week. diazePAM 5 mg tablet Commonly known as:  VALIUM Take 0.5 Tabs by mouth nightly as needed for Sleep. Max Daily Amount: 2.5 mg.  
  
 felodipine 10 mg 24 hr tablet Commonly known as:  PLENDIL SR Take 1 tablet by mouth  daily  
  
 finasteride 5 mg tablet Commonly known as:  PROSCAR Take 5 mg by mouth daily. RAPAFLO PO Take  by mouth daily. Prescriptions Sent to Pharmacy Refills  
 felodipine (PLENDIL SR) 10 mg 24 hr tablet 1 Sig: Take 1 tablet by mouth  daily Class: Normal  
 Pharmacy: [address] [place] [address], [facility]  #: [phone] Follow-up Instructions Return in about 1 month (around 8/27/2018) for Blood pressure check. Patient Instructions Sleep Medication Recommendations: 
 
Over the counter: 
Melatonin Prescriptions: 
Trazodone Learning About Sleeping Well What does sleeping well mean? Sleeping well means getting enough sleep. How much sleep is enough varies among people. The number of hours you sleep is not as important as how you feel when you wake up. If you do not feel refreshed, you probably need more sleep. Another sign of not getting enough sleep is feeling tired during the day. The average total nightly sleep time is 7½ to 8 hours. Healthy adults may need a little more or a little less than this. Why is getting enough sleep important? Getting enough quality sleep is a basic part of good health. When your sleep suffers, your mood and your thoughts can suffer too. You may find yourself feeling more grumpy or stressed. Not getting enough sleep also can lead to serious problems, including injury, accidents, anxiety, and depression. What might cause poor sleeping? Many things can cause sleep problems, including: · Stress. Stress can be caused by fear about a single event, such as giving a speech. Or you may have ongoing stress, such as worry about work or school. · Depression, anxiety, and other mental or emotional conditions. · Changes in your sleep habits or surroundings. This includes changes that happen where you sleep, such as noise, light, or sleeping in a different bed. It also includes changes in your sleep pattern, such as having jet lag or working a late shift. · Health problems, such as pain, breathing problems, and restless legs syndrome. · Lack of regular exercise. How can you help yourself? Here are some tips that may help you sleep more soundly and wake up feeling more refreshed. Your sleeping area · Use your bedroom only for sleeping and sex. A bit of light reading may help you fall asleep. But if it doesn't, do your reading elsewhere in the house. Don't watch TV in bed. · Be sure your bed is big enough to stretch out comfortably, especially if you have a sleep partner. · Keep your bedroom quiet, dark, and cool. Use curtains, blinds, or a sleep mask to block out light. To block out noise, use earplugs, soothing music, or a \"white noise\" machine. Your evening and bedtime routine · Create a relaxing bedtime routine. You might want to take a warm shower or bath, listen to soothing music, or drink a cup of noncaffeinated tea. · Go to bed at the same time every night. And get up at the same time every morning, even if you feel tired. What to avoid · Limit caffeine (coffee, tea, caffeinated sodas) during the day, and don't have any for at least 4 to 6 hours before bedtime. · Don't drink alcohol before bedtime. Alcohol can cause you to wake up more often during the night. · Don't smoke or use tobacco, especially in the evening. Nicotine can keep you awake. · Don't take naps during the day, especially close to bedtime. · Don't lie in bed awake for too long. If you can't fall asleep, or if you wake up in the middle of the night and can't get back to sleep within 15 minutes or so, get out of bed and go to another room until you feel sleepy. · Don't take medicine right before bed that may keep you awake or make you feel hyper or energized. Your doctor can tell you if your medicine may do this and if you can take it earlier in the day. If you can't sleep · Imagine yourself in a peaceful, pleasant scene. Focus on the details and feelings of being in a place that is relaxing. · Get up and do a quiet or boring activity until you feel sleepy. · Don't drink any liquids after 6 p.m. if you wake up often because you have to go to the bathroom. Where can you learn more? Go to http://nara-caroline.info/. Enter Z737 in the search box to learn more about \"Learning About Sleeping Well. \" Current as of: December 7, 2017 Content Version: 11.7 © 2223-1564 Healthwise, Incorporated. Care instructions adapted under license by Be my eyes (which disclaims liability or warranty for this information). If you have questions about a medical condition or this instruction, always ask your healthcare professional. [place] [address] any warranty or liability for your use of this information. Learning About High Blood Pressure What is high blood pressure? Blood pressure is a measure of how hard the blood pushes against the walls of your arteries.  It's normal for blood pressure to go up and down throughout the day, but if it stays up, you have high blood pressure. Another name for high blood pressure is hypertension. Two numbers tell you your blood pressure. The first number is the systolic pressure. It shows how hard the blood pushes when your heart is pumping. The second number is the diastolic pressure. It shows how hard the blood pushes between heartbeats, when your heart is relaxed and filling with blood. A blood pressure of less than 120/80 (say \"120 over 80\") is ideal for an adult. High blood pressure is 130/80 or higher. You have high blood pressure if your top number is 130 or higher or your bottom number is 80 or higher, or both. What happens when you have high blood pressure? · Blood flows through your arteries with too much force. Over time, this damages the walls of your arteries. But you can't feel it. High blood pressure usually doesn't cause symptoms. · Fat and calcium start to build up in your arteries. This buildup is called plaque. Plaque makes your arteries narrower and stiffer. Blood can't flow through them as easily. · This lack of good blood flow starts to damage some of the organs in your body. This can lead to problems such as coronary artery disease and heart attack, heart failure, stroke, kidney failure, and eye damage. How can you prevent high blood pressure? · Stay at a healthy weight. · Try to limit how much sodium you eat to less than 2,300 milligrams (mg) a day. If you limit your sodium to 1,500 mg a day, you can lower your blood pressure even more. ¨ Buy foods that are labeled \"unsalted,\" \"sodium-free,\" or \"low-sodium. \" Foods labeled \"reduced-sodium\" and \"light sodium\" may still have too much sodium. ¨ Flavor your food with garlic, lemon juice, onion, vinegar, herbs, and spices instead of salt. Do not use soy sauce, steak sauce, onion salt, garlic salt, mustard, or ketchup on your food. ¨ Use less salt (or none) when recipes call for it.  You can often use half the salt a recipe calls for without losing flavor. · Be physically active. Get at least 30 minutes of exercise on most days of the week. Walking is a good choice. You also may want to do other activities, such as running, swimming, cycling, or playing tennis or team sports. · Limit alcohol to 2 drinks a day for men and 1 drink a day for women. · Eat plenty of fruits, vegetables, and low-fat dairy products. Eat less saturated and total fats. How is high blood pressure treated? · Your doctor will suggest making lifestyle changes. For example, your doctor may ask you to eat healthy foods, quit smoking, lose extra weight, and be more active. · If lifestyle changes don't help enough or your blood pressure is very high, you will have to take medicine every day. Follow-up care is a key part of your treatment and safety. Be sure to make and go to all appointments, and call your doctor if you are having problems. It's also a good idea to know your test results and keep a list of the medicines you take. Where can you learn more? Go to http://nara-caroline.info/. Enter P501 in the search box to learn more about \"Learning About High Blood Pressure. \" Current as of: May 10, 2017 Content Version: 11.7 © 1526-6534 Boomi, Incorporated. Care instructions adapted under license by [employer] (which disclaims liability or warranty for this information). If you have questions about a medical condition or this instruction, always ask your healthcare professional. [place] [address] any warranty or liability for your use of this information. Introducing [facility] & HEALTH SERVICES! Preston Rowe introduces Narr8 patient portal. Now you can access parts of your medical record, email your doctor's office, and request medication refills online. 1. In your internet browser, go to [email or web address] A Deal 2. Click on the First Time User? Click Here link in the Sign In box. You will see the New Member Sign Up page. 3. Enter your lensgen Access Code exactly as it appears below. You will not need to use this code after youve completed the sign-up process. If you do not sign up before the expiration date, you must request a new code. · lensgen Access Code: [email or web address] Expires: 9/30/2018 10:00 AM 
 
4. Enter the last four digits of your Social Security Number (xxxx) and Date of Birth (mm/dd/yyyy) as indicated and click Submit. You will be taken to the next sign-up page. 5. Create a lensgen ID. This will be your lensgen login ID and cannot be changed, so think of one that is secure and easy to remember. 6. Create a lensgen password. You can change your password at any time. 7. Enter your Password Reset Question and Answer. This can be used at a later time if you forget your password. 8. Enter your e-mail address. You will receive e-mail notification when new information is available in [address] 9. Click Sign Up. You can now view and download portions of your medical record. 10. Click the Download Summary menu link to download a portable copy of your medical information. If you have questions, please visit the Frequently Asked Questions section of the lensgen website. Remember, lensgen is NOT to be used for urgent needs. For medical emergencies, dial 911. Now available from your iPhone and Android! Please provide this summary of care documentation to your next provider. Your primary care clinician is listed as Sydnee Benitez. If you have any questions after today's visit, please call [phone].

## 2018-08-14 ENCOUNTER — TELEPHONE (OUTPATIENT)
Dept: INTERNAL MEDICINE CLINIC | Age: 83
End: 2018-08-14

## 2018-08-14 NOTE — TELEPHONE ENCOUNTER
5 wasp stung him today while cutting the grass. Can you call in Cephalexin 500 mg for him?  He said this worked well last time he was stung

## 2018-08-14 NOTE — TELEPHONE ENCOUNTER
Advised pt MD on call recommends he take benadryl 25 mg q6 hr prn swelling and itching . Apply cortisone 10 cream as directed to the stings. Scheduled appt with Dr Anastasia Lerner tomorrow 8/15/18 at 11:30 am - last visit pt had significant swelling. He will call and cancel if better in am. He should go to ER if sob or dizziness.

## 2018-08-14 NOTE — TELEPHONE ENCOUNTER
Spoke with pt -  while cutting his grass today was stung by 5 wasps. He saw Dr Laney Tan last time this happened to him (7/25/18). States was stung on both legs and the areas where he was stung are itching with slight swelling. He was requesting medication given to him last time Keflex - helped last time. Advised him he was given keflex and prednisone for this. He did not remember taking prednisone. Advised him MD may want him to be seen prior to prescribing medication. Will forward to MD on call Dr Filiberto Victor.

## 2018-08-15 ENCOUNTER — OFFICE VISIT (OUTPATIENT)
Dept: INTERNAL MEDICINE CLINIC | Age: 83
End: 2018-08-15

## 2018-08-15 VITALS
OXYGEN SATURATION: 97 % | TEMPERATURE: 98.3 F | SYSTOLIC BLOOD PRESSURE: 124 MMHG | WEIGHT: 161.8 LBS | HEIGHT: 68 IN | HEART RATE: 67 BPM | DIASTOLIC BLOOD PRESSURE: 60 MMHG | BODY MASS INDEX: 24.52 KG/M2 | RESPIRATION RATE: 16 BRPM

## 2018-08-15 DIAGNOSIS — T63.481A INSECT STINGS, ACCIDENTAL OR UNINTENTIONAL, INITIAL ENCOUNTER: Primary | ICD-10-CM

## 2018-08-15 RX ORDER — CEPHALEXIN 500 MG/1
500 CAPSULE ORAL 3 TIMES DAILY
Qty: 21 CAP | Refills: 0 | Status: SHIPPED | OUTPATIENT
Start: 2018-08-15 | End: 2018-08-27 | Stop reason: ALTCHOICE

## 2018-08-15 RX ORDER — PREDNISONE 20 MG/1
20 TABLET ORAL DAILY
Qty: 5 TAB | Refills: 0 | Status: SHIPPED | OUTPATIENT
Start: 2018-08-15 | End: 2018-08-27 | Stop reason: ALTCHOICE

## 2018-08-15 NOTE — MR AVS SNAPSHOT
[address] [facility] [address] [place] [address] 
[phone] Patient: Luz Perez MRN: [id number] YFQ:[id number]/4/0341 Visit Information Date & Time Provider Department Dept. Phone Encounter #  
 8/15/2018 11:30 AM Reji Frost MD [facility] Internal Medicine [phone] [id number] Your Appointments 8/27/2018  9:15 AM  
ROUTINE CARE with Miguel Angel Martinez MD  
[facility] Internal Medicine [address] [place] [address]) Appt Note: 1mo f/u  
 [address] [place] [id number]  
[address] [place] [address]  
  
    
 11/27/2018  2:30 PM  
Medicare Physical with Miguel Angel Martinez MD  
[facility] Internal Medicine [address] [place] [address]) Appt Note: CPE; .  
 [address] [place] [address]  
[address] [place] [address] Upcoming Health Maintenance Date Due Influenza Age 5 to Adult 8/1/2018 MEDICARE YEARLY EXAM 9/1/2018 GLAUCOMA SCREENING Q2Y 2/21/2019 DTaP/Tdap/Td series (2 - Td) 8/20/2025 Allergies as of 8/15/2018  Review Complete On: 8/15/2018 By: Glenna Alexandra LPN No Known Allergies Current Immunizations  Reviewed on 7/27/2018 Name Date Influenza High Dose Vaccine PF 10/2/2017, 11/9/2016 Influenza Vaccine 8/15/2015 Pneumococcal Conjugate (PCV-13) 8/20/2015 Pneumococcal Polysaccharide (PPSV-23) 8/18/2016 10:30 AM  
 Tdap 8/20/2015 Zoster Vaccine, Live 8/20/2015 Not reviewed this visit You Were Diagnosed With   
  
 Codes Comments Insect stings, accidental or unintentional, initial encounter    -  Primary ICD-10-CM: P96.501N ICD-9-CM: 989.5, E905.5 Vitals BP Pulse Temp Resp Height(growth percentile) Weight(growth percentile) 124/60 67 98.3 °F (36.8 °C) (Oral) 16 5' 7.6\" (1.717 m) 161 lb 12.8 oz (73.4 kg) SpO2 BMI Smoking Status 97% 24.89 kg/m2 Former Smoker Vitals History BMI and BSA Data Body Mass Index Body Surface Area  
 24.89 kg/m 2 1.87 m 2 Preferred Pharmacy Pharmacy Name Phone [id number] [phone] Your Updated Medication List  
  
   
This list is accurate as of 8/15/18 12:02 PM.  Always use your most recent med list.  
  
  
  
  
 aspirin 81 mg tablet Take 81 mg by mouth daily. CENTRUM SILVER Tab tablet Generic drug:  multivitamins-minerals-lutein Take 1 Tab by mouth two (2) times a week. cephALEXin 500 mg capsule Commonly known as:  Delwyn Pay Take 1 Cap by mouth three (3) times daily. diazePAM 5 mg tablet Commonly known as:  VALIUM Take 0.5 Tabs by mouth nightly as needed for Sleep. Max Daily Amount: 2.5 mg.  
  
 felodipine 10 mg 24 hr tablet Commonly known as:  PLENDIL SR Take 1 tablet by mouth  daily  
  
 finasteride 5 mg tablet Commonly known as:  PROSCAR Take 5 mg by mouth daily. predniSONE 20 mg tablet Commonly known as:  Sushma Hawks Take 1 Tab by mouth daily. RAPAFLO PO Take  by mouth daily. Prescriptions Sent to Pharmacy Refills  
 predniSONE (DELTASONE) 20 mg tablet 0 Sig: Take 1 Tab by mouth daily. Class: Normal  
 Pharmacy: [facility]/pharmacy #[id number]- [address] Ph #: [phone] Route: Oral  
 cephALEXin (KEFLEX) 500 mg capsule 0 Sig: Take 1 Cap by mouth three (3) times daily. Class: Normal  
 Pharmacy: [facility]/pharmacy #[id number]- [address] Ph #: [phone] Route: Oral  
  
Patient Instructions Insect Stings and Bites: Care Instructions Your Care Instructions Stings and bites from bees, wasps, ants, and other insects often cause pain, swelling, redness, and itching. In some people, especially children, the redness and swelling may be worse.  It may extend several inches beyond the affected area. But in most cases, stings and bites don't cause reactions all over the body. If you have had a reaction to an insect sting or bite, you are at risk for a reaction if you get stung or bitten again. Follow-up care is a key part of your treatment and safety. Be sure to make and go to all appointments, and call your doctor if you are having problems. It's also a good idea to know your test results and keep a list of the medicines you take. How can you care for yourself at home? · Do not scratch or rub the skin where the sting or bite occurred. · Put a cold pack or ice cube on the area. Put a thin cloth between the ice and your skin. For some people, a paste of baking soda mixed with a little water helps relieve pain and decrease the reaction. · Take an over-the-counter antihistamine, such as diphenhydramine (Benadryl) or loratadine (Claritin), to relieve swelling, redness, and itching. Calamine lotion or hydrocortisone cream may also help. Do not give antihistamines to your child unless you have checked with the doctor first. 
· Be safe with medicines. If your doctor prescribed medicine for your allergy, take it exactly as prescribed. Call your doctor if you think you are having a problem with your medicine. You will get more details on the specific medicines your doctor prescribes. · Your doctor may prescribe a shot of epinephrine to carry with you in case you have a severe reaction. Learn how and when to give yourself the shot, and keep it with you at all times. Make sure it has not . · Go to the emergency room anytime you have a severe reaction. Go even if you have given yourself epinephrine and are feeling better. Symptoms can come back. When should you call for help? Call 911 anytime you think you may need emergency care. For example, call if: 
  · You have symptoms of a severe allergic reaction. These may include: 
¨ Sudden raised, red areas (hives) all over your body. ¨ Swelling of the throat, mouth, lips, or tongue. ¨ Trouble breathing. ¨ Passing out (losing consciousness). Or you may feel very lightheaded or suddenly feel weak, confused, or restless.  
 Call your doctor now or seek immediate medical care if: 
  · You have symptoms of an allergic reaction not right at the sting or bite, such as: ¨ A rash or small area of hives (raised, red areas on the skin). ¨ Itching. ¨ Swelling. ¨ Belly pain, nausea, or vomiting.  
  · You have a lot of swelling around the site (such as your entire arm or leg is swollen).  
  · You have signs of infection, such as: 
¨ Increased pain, swelling, redness, or warmth around the sting. ¨ Red streaks leading from the area. ¨ Pus draining from the sting. ¨ A fever.  
 Watch closely for changes in your health, and be sure to contact your doctor if: 
  · You do not get better as expected. Where can you learn more? Go to http://nara-caroline.info/. Enter P390 in the search box to learn more about \"Insect Stings and Bites: Care Instructions. \" Current as of: November 20, 2017 Content Version: 11.7 © 1933-4965 Cumulus Networks. Care instructions adapted under license by [employer] (which disclaims liability or warranty for this information). If you have questions about a medical condition or this instruction, always ask your healthcare professional. [place] [address] any warranty or liability for your use of this information. Introducing [facility] & HEALTH SERVICES! [employer] introduces Crowdbooster patient portal. Now you can access parts of your medical record, email your doctor's office, and request medication refills online. 1. In your internet browser, go to [email or web address] 2. Click on the First Time User? Click Here link in the Sign In box. You will see the New Member Sign Up page. 3. Enter your Crowdbooster Access Code exactly as it appears below.  You will not need to use this code after youve completed the sign-up process. If you do not sign up before the expiration date, you must request a new code. · BizArk Access Code: [email or web address] Expires: 9/30/2018 10:00 AM 
 
4. Enter the last four digits of your Social Security Number (xxxx) and Date of Birth (mm/dd/yyyy) as indicated and click Submit. You will be taken to the next sign-up page. 5. Create a BizArk ID. This will be your BizArk login ID and cannot be changed, so think of one that is secure and easy to remember. 6. Create a BizArk password. You can change your password at any time. 7. Enter your Password Reset Question and Answer. This can be used at a later time if you forget your password. 8. Enter your e-mail address. You will receive e-mail notification when new information is available in [address] 9. Click Sign Up. You can now view and download portions of your medical record. 10. Click the Download Summary menu link to download a portable copy of your medical information. If you have questions, please visit the Frequently Asked Questions section of the BizArk website. Remember, BizArk is NOT to be used for urgent needs. For medical emergencies, dial 911. Now available from your iPhone and Android! Please provide this summary of care documentation to your next provider. Your primary care clinician is listed as Martinez Ibarra. If you have any questions after today's visit, please call [phone].

## 2018-08-15 NOTE — PATIENT INSTRUCTIONS
Insect Stings and Bites: Care Instructions  Your Care Instructions  Stings and bites from bees, wasps, ants, and other insects often cause pain, swelling, redness, and itching. In some people, especially children, the redness and swelling may be worse. It may extend several inches beyond the affected area. But in most cases, stings and bites don't cause reactions all over the body. If you have had a reaction to an insect sting or bite, you are at risk for a reaction if you get stung or bitten again. Follow-up care is a key part of your treatment and safety. Be sure to make and go to all appointments, and call your doctor if you are having problems. It's also a good idea to know your test results and keep a list of the medicines you take. How can you care for yourself at home? · Do not scratch or rub the skin where the sting or bite occurred. · Put a cold pack or ice cube on the area. Put a thin cloth between the ice and your skin. For some people, a paste of baking soda mixed with a little water helps relieve pain and decrease the reaction. · Take an over-the-counter antihistamine, such as diphenhydramine (Benadryl) or loratadine (Claritin), to relieve swelling, redness, and itching. Calamine lotion or hydrocortisone cream may also help. Do not give antihistamines to your child unless you have checked with the doctor first.  · Be safe with medicines. If your doctor prescribed medicine for your allergy, take it exactly as prescribed. Call your doctor if you think you are having a problem with your medicine. You will get more details on the specific medicines your doctor prescribes. · Your doctor may prescribe a shot of epinephrine to carry with you in case you have a severe reaction. Learn how and when to give yourself the shot, and keep it with you at all times. Make sure it has not . · Go to the emergency room anytime you have a severe reaction.  Go even if you have given yourself epinephrine and are feeling better. Symptoms can come back. When should you call for help? Call 911 anytime you think you may need emergency care. For example, call if:    · You have symptoms of a severe allergic reaction. These may include:  ¨ Sudden raised, red areas (hives) all over your body. ¨ Swelling of the throat, mouth, lips, or tongue. ¨ Trouble breathing. ¨ Passing out (losing consciousness). Or you may feel very lightheaded or suddenly feel weak, confused, or restless.    Call your doctor now or seek immediate medical care if:    · You have symptoms of an allergic reaction not right at the sting or bite, such as:  ¨ A rash or small area of hives (raised, red areas on the skin). ¨ Itching. ¨ Swelling. ¨ Belly pain, nausea, or vomiting.     · You have a lot of swelling around the site (such as your entire arm or leg is swollen).     · You have signs of infection, such as:  ¨ Increased pain, swelling, redness, or warmth around the sting. ¨ Red streaks leading from the area. ¨ Pus draining from the sting. ¨ A fever.    Watch closely for changes in your health, and be sure to contact your doctor if:    · You do not get better as expected. Where can you learn more? Go to http://nara-caroline.info/. Enter P390 in the search box to learn more about \"Insect Stings and Bites: Care Instructions. \"  Current as of: November 20, 2017  Content Version: 11.7  © 0384-4463 [employer]. Care instructions adapted under license by [employer] (which disclaims liability or warranty for this information). If you have questions about a medical condition or this instruction, always ask your healthcare professional. [place] [address] any warranty or liability for your use of this information.

## 2018-08-15 NOTE — PROGRESS NOTES
HPI:  Fidelina Deleon is a 81y.o. year old male who is here for a 1 day history of swelling and redness in the left lower leg after being stung by multiple wasps. He has had a previous episode about a year ago that happened on his arm and resolved with steroids and antibiotics. He has had itching but no fevers or chills. No sweats. No nausea vomiting. No change in bowel or bladder habits. He has otherwise been doing well and taking Benadryl and using topical steroids. Past Medical History:   Diagnosis Date    BPH (benign prostatic hypertrophy)     Cataract, bilateral 04/06/2016    s/p surgery in 2016    Hypertension     Insomnia 2/11/2015    Shingles        Past Surgical History:   Procedure Laterality Date    HX APPENDECTOMY      HX CATARACT REMOVAL Right 4/11/16    HX CATARACT REMOVAL Left 04/25/2016    HX COLONOSCOPY  1/29/13    normal    HX KNEE ARTHROSCOPY Right     HX ORTHOPAEDIC Right     scar tissue removal after surgery       Prior to Admission medications    Medication Sig Start Date End Date Taking? Authorizing Provider   predniSONE (DELTASONE) 20 mg tablet Take 1 Tab by mouth daily. 8/15/18  Yes Alicia Jurado III, MD   cephALEXin (KEFLEX) 500 mg capsule Take 1 Cap by mouth three (3) times daily. 8/15/18  Yes Alicia Jurado III, MD   felodipine (PLENDIL SR) 10 mg 24 hr tablet Take 1 tablet by mouth  daily 7/27/18  Yes Avery Narvaez MD   finasteride (PROSCAR) 5 mg tablet Take 5 mg by mouth daily. 8/15/15  Yes Historical Provider   aspirin 81 mg tablet Take 81 mg by mouth daily. Yes Historical Provider   multivitamins-minerals-lutein (CENTRUM SILVER) Tab Take 1 Tab by mouth two (2) times a week. Yes Historical Provider   silodosin (RAPAFLO PO) Take  by mouth daily. Historical Provider   diazePAM (VALIUM) 5 mg tablet Take 0.5 Tabs by mouth nightly as needed for Sleep.  Max Daily Amount: 2.5 mg. 1/15/18   Avery Narvaez MD       Social History     Social History    Marital status:      Spouse name: N/A    Number of children: N/A    Years of education: N/A     Occupational History    Not on file. Social History Main Topics    Smoking status: Former Smoker    Smokeless tobacco: Never Used      Comment: quit smoking cigarettes/pipe - age in 30's    Alcohol use 3.5 oz/week     7 Glasses of wine per week    Drug use: No    Sexual activity: Not Currently     Partners: Female     Other Topics Concern    Not on file     Social History Narrative          ROS  Per HPI. No SOB or throat swelling. Visit Vitals    /60    Pulse 67    Temp 98.3 °F (36.8 °C) (Oral)    Resp 16    Ht 5' 7.6\" (1.717 m)    Wt 161 lb 12.8 oz (73.4 kg)    SpO2 97%    BMI 24.89 kg/m2         Physical Exam   Physical Examination: General appearance - alert, well appearing, and in no distress  Chest - clear to auscultation, no wheezes, rales or rhonchi, symmetric air entry  Heart - normal rate, regular rhythm, normal S1, S2, no murmurs, rubs, clicks or gallops  Lower leg with moderate erythema and some ecchymosis. Multiple small bites. There is some warmth and some tenderness across the anterior shin. No calf tenderness. Pulses are intact and 2+. Assessment/Plan:  Diagnoses and all orders for this visit:    1. Insect stings, accidental or unintentional, initial encounter -? With cellulitis versus local reaction. We will treat with both antibiotics and steroids and he will let me know if not improving over the next few days. He can use Benadryl at night to help with itching as well but was warned about sedation. Other orders  -     predniSONE (DELTASONE) 20 mg tablet; Take 1 Tab by mouth daily. -     cephALEXin (KEFLEX) 500 mg capsule; Take 1 Cap by mouth three (3) times daily.        Follow-up Disposition: as needed       Advised him to call back or return to office if symptoms worsen/change/persist.  Discussed expected course/resolution/complications of diagnosis in detail with patient. Medication risks/benefits/costs/interactions/alternatives discussed with patient. He was given an after visit summary which includes diagnoses, current medications, & vitals. He expressed understanding with the diagnosis and plan.

## 2018-08-21 NOTE — TELEPHONE ENCOUNTER
Return call to patient. He reports his BP readings have been 130-140/70-80 average, as low as 118/70. He has been checking at the [facility] when he is there and at other doctor appointments. Has not obtained a new BP cuff. He will keep his scheduled appointment with Dr. Bayron Minaya on 08/27/18.

## 2018-08-27 ENCOUNTER — OFFICE VISIT (OUTPATIENT)
Dept: INTERNAL MEDICINE CLINIC | Age: 83
End: 2018-08-27

## 2018-08-27 VITALS
DIASTOLIC BLOOD PRESSURE: 72 MMHG | BODY MASS INDEX: 24.19 KG/M2 | SYSTOLIC BLOOD PRESSURE: 140 MMHG | OXYGEN SATURATION: 98 % | WEIGHT: 159.6 LBS | HEART RATE: 60 BPM | HEIGHT: 68 IN | TEMPERATURE: 97.8 F | RESPIRATION RATE: 16 BRPM

## 2018-08-27 DIAGNOSIS — F51.01 PRIMARY INSOMNIA: ICD-10-CM

## 2018-08-27 DIAGNOSIS — Z13.31 SCREENING FOR DEPRESSION: ICD-10-CM

## 2018-08-27 DIAGNOSIS — N40.1 BPH ASSOCIATED WITH NOCTURIA: ICD-10-CM

## 2018-08-27 DIAGNOSIS — I10 ESSENTIAL HYPERTENSION: ICD-10-CM

## 2018-08-27 DIAGNOSIS — Z00.00 MEDICARE ANNUAL WELLNESS VISIT, SUBSEQUENT: Primary | ICD-10-CM

## 2018-08-27 DIAGNOSIS — R35.1 BPH ASSOCIATED WITH NOCTURIA: ICD-10-CM

## 2018-08-27 DIAGNOSIS — Z23 ENCOUNTER FOR IMMUNIZATION: ICD-10-CM

## 2018-08-27 DIAGNOSIS — Z71.89 ACP (ADVANCE CARE PLANNING): ICD-10-CM

## 2018-08-27 DIAGNOSIS — Z13.39 SCREENING FOR ALCOHOLISM: ICD-10-CM

## 2018-08-27 NOTE — MR AVS SNAPSHOT
[address] [facility] 
[phone] Patient: Skye Garcia MRN: [id number] Visit Information Date & Time Provider Department Dept. Phone Encounter #  
 2018  9:15 AM Ivette Acosta [facility] Internal Medicine [phone] [id number] Follow-up Instructions Return in about 6 months (around 2019), or if symptoms worsen or fail to improve, for Blood pressure check. Your Appointments 2018  2:30 PM  
Medicare Physical with Ivette Acosta MD  
[facility] Internal Medicine [facility] Appt Note: CPE; .  
 [address] [place] [address]  
Elaine Rubiorad [address] [id number] [address] Upcoming Health Maintenance Date Due Influenza Age 5 to Adult 2018 MEDICARE YEARLY EXAM 2018 GLAUCOMA SCREENING Q2Y 2019 DTaP/Tdap/Td series (2 - Td) 2025 Allergies as of 2018  Review Complete On: 2018 By: Neida Monteiro RN No Known Allergies Current Immunizations  Reviewed on 2018 Name Date Influenza High Dose Vaccine PF 10/2/2017, 2016 Influenza Vaccine 8/15/2015 Pneumococcal Conjugate (PCV-13) 2015 Pneumococcal Polysaccharide (PPSV-23) 2016 10:30 AM  
 Tdap 2015 Zoster Vaccine, Live 2015 Reviewed by Neida Monteiro RN on 2018 at  9:17 AM  
You Were Diagnosed With   
  
 Codes Comments Medicare annual wellness visit, subsequent    -  Primary ICD-10-CM: Z00.00 ICD-9-CM: V70.0 Encounter for immunization     ICD-10-CM: D82 ICD-9-CM: V03.89   
 ACP (advance care planning)     ICD-10-CM: Z71.89 ICD-9-CM: V65.49 Screening for alcoholism     ICD-10-CM: Z13.89 ICD-9-CM: V79.1 Screening for depression     ICD-10-CM: Z13.89 ICD-9-CM: V79.0  Essential hypertension     ICD-10-CM: I10 
 ICD-9-CM: 401.9 BPH associated with nocturia     ICD-10-CM: N40.1, R35.1 ICD-9-CM: 600.01, 788.43 Primary insomnia     ICD-10-CM: F51.01 
ICD-9-CM: 307.42 Vitals BP Pulse Temp Resp Height(growth percentile) Weight(growth percentile) 140/72 60 97.8 °F (36.6 °C) (Oral) 16 5' 7.6\" (1.717 m) 159 lb 9.6 oz (72.4 kg) SpO2 BMI Smoking Status 98% 24.56 kg/m2 Former Smoker Vitals History BMI and BSA Data Body Mass Index Body Surface Area 24.56 kg/m 2 1.86 m 2 Preferred Pharmacy Pharmacy Name Phone [id number] [phone] Your Updated Medication List  
  
   
This list is accurate as of 8/27/18  9:52 AM.  Always use your most recent med list.  
  
  
  
  
 aspirin 81 mg tablet Take 81 mg by mouth daily. CENTRUM SILVER Tab tablet Generic drug:  multivitamins-minerals-lutein Take 1 Tab by mouth two (2) times a week. diazePAM 5 mg tablet Commonly known as:  VALIUM Take 0.5 Tabs by mouth nightly as needed for Sleep. Max Daily Amount: 2.5 mg.  
  
 felodipine 10 mg 24 hr tablet Commonly known as:  PLENDIL SR Take 1 tablet by mouth  daily  
  
 finasteride 5 mg tablet Commonly known as:  PROSCAR Take 5 mg by mouth daily. RAPAFLO PO Take  by mouth daily. We Performed the Following Lui 68 [[id number] \[facility]\""] MT ANNUAL ALCOHOL SCREEN 15 MIN [id number] \[facility]\""] Follow-up Instructions Return in about 6 months (around 2/27/2019), or if symptoms worsen or fail to improve, for Blood pressure check. Patient Instructions Medicare Wellness Visit, Male The best way to live healthy is to have a lifestyle where you eat a well-balanced diet, exercise regularly, limit alcohol use, and quit all forms of tobacco/nicotine, if applicable. Regular preventive services are another way to keep healthy.  Preventive services (vaccines, screening tests, monitoring & exams) can help personalize your care plan, which helps you manage your own care. Screening tests can find health problems at the earliest stages, when they are easiest to treat. [address] [facility] follows the current, evidence-based guidelines published by the [facility] Colby Castaneda ([facility]) when recommending preventive services for our patients. Because we follow these guidelines, sometimes recommendations change over time as research supports it. (For example, a prostate screening blood test is no longer routinely recommended for men with no symptoms.) Of course, you and your doctor may decide to screen more often for some diseases, based on your risk and co-morbidities (chronic disease you are already diagnosed with). Preventive services for you include: - Medicare offers their members a free annual wellness visit, which is time for you and your primary care provider to discuss and plan for your preventive service needs. Take advantage of this benefit every year! 
-All adults over age 72 should receive the recommended pneumonia vaccines. Current USPSTF guidelines recommend a series of two vaccines for the best pneumonia protection.  
-All adults should have a flu vaccine yearly and an ECG.  All adults age 61 and older should receive a shingles vaccine once in their lifetime.   
-All adults age 38-68 who are overweight should have a diabetes screening test once every three years.  
-Other screening tests & preventive services for persons with diabetes include: an eye exam to screen for diabetic retinopathy, a kidney function test, a foot exam, and stricter control over your cholesterol.  
-Cardiovascular screening for adults with routine risk involves an electrocardiogram (ECG) at intervals determined by the provider.  
-Colorectal cancer screening should be done for adults age 54-65 with no increased risk factors for colorectal cancer. There are a number of acceptable methods of screening for this type of cancer. Each test has its own benefits and drawbacks. Discuss with your provider what is most appropriate for you during your annual wellness visit. The different tests include: colonoscopy (considered the best screening method), a fecal occult blood test, a fecal DNA test, and sigmoidoscopy. 
-All adults born between [facility] should be screened once for Hepatitis C. 
-An Abdominal Aortic Aneurysm (AAA) Screening is recommended for men age 73-68 who has ever smoked in their lifetime. Here is a list of your current Health Maintenance items (your personalized list of preventive services) with a due date: 
Health Maintenance Due Topic Date Due  
 Flu Vaccine  08/01/2018 Introducing [facility]! [employer] introduces [employer] patient portal. Now you can access parts of your medical record, email your doctor's office, and request medication refills online. 1. In your internet browser, go to [email or web address] 2. Click on the First Time User? Click Here link in the Sign In box. You will see the New Member Sign Up page. 3. Enter your [employer] Access Code exactly as it appears below. You will not need to use this code after youve completed the sign-up process. If you do not sign up before the expiration date, you must request a new code. · [employer] Access Code: [email or web address] Expires: 9/30/2018 10:00 AM 
 
4. Enter the last four digits of your Social Security Number (xxxx) and Date of Birth (mm/dd/yyyy) as indicated and click Submit. You will be taken to the next sign-up page. 5. Create a Locappyt ID. This will be your [employer] login ID and cannot be changed, so think of one that is secure and easy to remember. 6. Create a [employer] password. You can change your password at any time. 7. Enter your Password Reset Question and Answer. This can be used at a later time if you forget your password. 8. Enter your e-mail address. You will receive e-mail notification when new information is available in [address] 9. Click Sign Up. You can now view and download portions of your medical record. 10. Click the Download Summary menu link to download a portable copy of your medical information. If you have questions, please visit the Frequently Asked Questions section of the [employer] website. Remember, [employer] is NOT to be used for urgent needs. For medical emergencies, dial 911. Now available from your iPhone and Android! Please provide this summary of care documentation to your next provider. Your primary care clinician is listed as Skipper Hammans. If you have any questions after today's visit, please call [phone].

## 2018-08-27 NOTE — PROGRESS NOTES
David Lane is a 81 y.o. male who was seen in clinic today (8/27/2018) for a full physical.        Assessment & Plan:   Diagnoses and all orders for this visit:    1. Medicare annual wellness visit, subsequent       -     Previous labs reviewed & discussed with him     2. Encounter for immunization    3. ACP (advance care planning)    4. Screening for alcoholism  -     Annual  Alcohol Screen 15 min ()    5. Screening for depression  -     Depression Screen Annual    6. Essential hypertension- at goal for age, reviewed as long as -140's would not make any changes. Will continue to check amb BP and update me every few months. 7. BPH associated with nocturia- uncontrolled but stable, reviewed I can take over writing meds since only seeing specialist yearly, if considering surgery will have him go back to see specialist    8. Primary insomnia- improved w/ melatonin, worse due to #7. Follow-up Disposition:  Return in about 6 months (around 2/27/2019), or if symptoms worsen or fail to improve, for Blood pressure check. ------------------------------------------------------------------------------------------    Subjective: Annual Wellness Visit- Subsequent Visit    End of Life Planning: This was discussed with him today and he has an advanced directive - a copy has been provided. Reviewed DNR/DNI and patient is interested in remaining a DNR, no changes made. Depression Screen:  PHQ over the last two weeks 8/27/2018   Little interest or pleasure in doing things Not at all   Feeling down, depressed, irritable, or hopeless Not at all   Total Score PHQ 2 0         Fall Risk:   Fall Risk Assessment, last 12 mths 8/27/2018   Able to walk? Yes   Fall in past 12 months? No       Abuse Screen:  Abuse Screening Questionnaire 8/27/2018   Do you ever feel afraid of your partner? N   Are you in a relationship with someone who physically or mentally threatens you?  N   Is it safe for you to go home? Y         Alcohol Risk Factor Screening: On any occasion during the past 3 months, have you had more than 3 drinks containing alcohol? No  Do you average more than 7 drinks per week? No    Hearing Loss: The patient wears hearing aids. Cognition Screen:  Has your family/caregiver stated any concerns about your memory: no    Activities of Daily Living:    Requires assistance with: no ADLs  Home contains: no safety equipment but he is considering. Exercise: very active    Adult Nutrition Screen:  No risk factors noted. Health Maintenance  Daily Aspirin: yes  Glaucoma Screening: UTD    Immunizations:    Influenza: he will plan on getting it this fall. Tetanus: up to date. Shingles: due for Shingrix - script provided. Pneumonia: up to date. Cancer screening:    Prostate: reviewed guidelines, n/a. Colon: guidelines reviewed, n/a. Patient Care Team:  Richrd Paget, MD as PCP - General (Internal Medicine)  Jordon Cai MD (Urology)  Mary Ellen Mari MD (Dermatology)  Pascal Spurling, MD as Physician (Ophthalmology)  Tri Anaya MD as Physician (Orthopedic Surgery)       In addition to the above issues we also reviewed the following acute and/or chronic problems:    Cardiovascular Review  The patient has hypertension. He reports taking medications as instructed, no medication side effects noted. He is planning to restart alpha blocker for BPH in the next few weeks. He is checking BP sporadically and normally 130-140's/60-70's. He will get 160's/80's at times. Diet and Lifestyle: generally follows a low fat low cholesterol diet, generally follows a low sodium diet, exercises regularly. Labs: reviewed and discussed with patient. Urology Review  He is here today because of BPH. He reports his symptoms are stable. His symptoms include: nocturia x 5-6, urinary hesitancy. He denies: double voiding, weak stream, dysuria, hematuria.   He is on the following medications: Rapaflo and Proscar. He reports the following medication side effects: none. Lab review: labs reviewed and discussed with patient. Mental Health Review  Patient is seen for insomnia. Ongoing sleep issues include: trouble staying asleep. He tried Melatonin 2.5-5mg and seems to be working well. Reports experiences the following side effects from the treatment: none. The following sections were reviewed & updated as appropriate: PMH, PSH, FH, and SH. Patient Active Problem List   Diagnosis Code    Hypertension I10    BPH associated with nocturia N40.1, R35.1    Insomnia G47.00    Bee sting allergy Z91.038          Prior to Admission medications    Medication Sig Start Date End Date Taking? Authorizing Provider   felodipine (PLENDIL SR) 10 mg 24 hr tablet Take 1 tablet by mouth  daily 7/27/18  Yes Lui Vaughan MD   finasteride (PROSCAR) 5 mg tablet Take 5 mg by mouth daily. 8/15/15  Yes Historical Provider   aspirin 81 mg tablet Take 81 mg by mouth daily. Yes Historical Provider   multivitamins-minerals-lutein (CENTRUM SILVER) Tab Take 1 Tab by mouth two (2) times a week. Yes Historical Provider   silodosin (RAPAFLO PO) Take  by mouth daily. Historical Provider   diazePAM (VALIUM) 5 mg tablet Take 0.5 Tabs by mouth nightly as needed for Sleep. Max Daily Amount: 2.5 mg. 1/15/18   Lui Vaughan MD          No Known Allergies           Review of Systems   Constitutional: Negative for malaise/fatigue and weight loss. Respiratory: Negative for cough and shortness of breath. Cardiovascular: Negative for chest pain, palpitations and leg swelling. Gastrointestinal: Negative for abdominal pain, constipation, diarrhea, heartburn, nausea and vomiting. Genitourinary: Positive for frequency and urgency. Musculoskeletal: Negative for myalgias. Joint pain: foot pain, seeing ortho, notes available. Skin: Negative for rash.    Neurological: Negative for tingling, sensory change, focal weakness and headaches. Psychiatric/Behavioral: Negative for depression. The patient is not nervous/anxious and does not have insomnia. Objective:   Physical Exam   Constitutional: No distress. HENT:   Mouth/Throat: Mucous membranes are normal.   Eyes: Conjunctivae are normal. No scleral icterus. Neck: Neck supple. Cardiovascular: Regular rhythm and normal heart sounds. Bradycardia present. No murmur heard. Pulmonary/Chest: Effort normal and breath sounds normal. He has no wheezes. He has no rales. Abdominal: Soft. Bowel sounds are normal. He exhibits no mass. There is no hepatosplenomegaly. There is no tenderness. Musculoskeletal: He exhibits no edema. Lymphadenopathy:     He has no cervical adenopathy. Skin: No rash noted. Psychiatric: He has a normal mood and affect. His behavior is normal.          Visit Vitals    /72    Pulse 60    Temp 97.8 °F (36.6 °C) (Oral)    Resp 16    Ht 5' 7.6\" (1.717 m)    Wt 159 lb 9.6 oz (72.4 kg)    SpO2 98%    BMI 24.56 kg/m2          Advised him to call back or return to office if symptoms worsen/change/persist.  Discussed expected course/resolution/complications of diagnosis in detail with patient. Medication risks/benefits/costs/interactions/alternatives discussed with patient. He was given an after visit summary which includes diagnoses, current medications, & vitals. He expressed understanding with the diagnosis and plan. Aspects of this note may have been generated using voice recognition software. Despite editing, there may be some syntax errors.        Cynthia Taylor MD

## 2018-08-27 NOTE — PATIENT INSTRUCTIONS
Medicare Wellness Visit, Male    The best way to live healthy is to have a lifestyle where you eat a well-balanced diet, exercise regularly, limit alcohol use, and quit all forms of tobacco/nicotine, if applicable. Regular preventive services are another way to keep healthy. Preventive services (vaccines, screening tests, monitoring & exams) can help personalize your care plan, which helps you manage your own care. Screening tests can find health problems at the earliest stages, when they are easiest to treat. [facility] follows the current, evidence-based guidelines published by the [facility] ([facility]) when recommending preventive services for our patients. Because we follow these guidelines, sometimes recommendations change over time as research supports it. (For example, a prostate screening blood test is no longer routinely recommended for men with no symptoms.)  Of course, you and your doctor may decide to screen more often for some diseases, based on your risk and co-morbidities (chronic disease you are already diagnosed with). Preventive services for you include:  - Medicare offers their members a free annual wellness visit, which is time for you and your primary care provider to discuss and plan for your preventive service needs. Take advantage of this benefit every year!  -All adults over age 72 should receive the recommended pneumonia vaccines. Current USPSTF guidelines recommend a series of two vaccines for the best pneumonia protection.   -All adults should have a flu vaccine yearly and an ECG.  All adults age 61 and older should receive a shingles vaccine once in their lifetime.    -All adults age 38-68 who are overweight should have a diabetes screening test once every three years.   -Other screening tests & preventive services for persons with diabetes include: an eye exam to screen for diabetic retinopathy, a kidney function test, a foot exam, and stricter control over your cholesterol.   -Cardiovascular screening for adults with routine risk involves an electrocardiogram (ECG) at intervals determined by the provider.   -Colorectal cancer screening should be done for adults age 54-65 with no increased risk factors for colorectal cancer. There are a number of acceptable methods of screening for this type of cancer. Each test has its own benefits and drawbacks. Discuss with your provider what is most appropriate for you during your annual wellness visit. The different tests include: colonoscopy (considered the best screening method), a fecal occult blood test, a fecal DNA test, and sigmoidoscopy.  -All adults born between [facility] should be screened once for Hepatitis C.  -An Abdominal Aortic Aneurysm (AAA) Screening is recommended for men age 73-68 who has ever smoked in their lifetime.      Here is a list of your current Health Maintenance items (your personalized list of preventive services) with a due date:  Health Maintenance Due   Topic Date Due    Flu Vaccine  08/01/2018

## 2018-08-27 NOTE — ACP (ADVANCE CARE PLANNING)
Advance Care Planning (ACP) Provider Note - Comprehensive     Date of ACP Conversation: 08/27/18  Persons included in Conversation:  patient  Length of ACP Conversation in minutes: <16 minutes (Non-Billable)    Authorized Decision Maker (if patient is incapable of making informed decisions): This person is: Healthcare Agent/Medical Power of  under Advance Directive      He has an advanced directive - a copy has been provided & still reflects him wishes. Reviewed DNR/DNI and patient is interested in remaining a DNR, no changes made. General ACP for ALL Patients with Decision Making Capacity:  Importance of advance care planning, including choosing a healthcare agent to communicate patient's healthcare decisions if patient lost the ability to make decisions, such as after a sudden illness or accident  Understanding of the healthcare agent role was assessed and information provided  Opportunity offered to explore how cultural, Baptism, spiritual, or personal beliefs would affect decisions for future care  Exploration of values, goals, and preferences if recovery is not expected, even with continued medical treatment in the event of: Imminent death or severe, permanent brain injury    For Serious or Chronic Illness:  Understanding of CPR, goals and expected outcomes, benefits and burdens discussed.   Understanding of medical condition  Information on CPR success rates provided (e.g. for CPR in hospital, survival to d/c at two weeks is 22%, for chronically ill or elderly/frail survival is less than 3%)    Interventions Provided:  Recommended communicating the plan and making copies for the healthcare agent, personal physician, and others as appropriate (e.g., health system)  Recommended review of completed ACP document annually or upon change in health status

## 2018-09-18 ENCOUNTER — TELEPHONE (OUTPATIENT)
Dept: INTERNAL MEDICINE CLINIC | Age: 83
End: 2018-09-18

## 2018-09-18 NOTE — TELEPHONE ENCOUNTER
Spoke with patient, verified name and . Patient  informed of provider recommendations. Patient verbalized understanding. Patient states he will continue medication.

## 2018-09-18 NOTE — TELEPHONE ENCOUNTER
I don't have a h/o stroke for him. If he did have a stroke then he need to continue ASA. ASA is NOT contra-indicated it needs to be used in caution. If he has not had a stroke and since he has no issues w/ DM or CAD then he can stop it. He has been tolerating it well for this long that I don't feel he has to stop it, but he can.

## 2018-09-18 NOTE — TELEPHONE ENCOUNTER
Patient has concerns about taking baby aspirin in light of recent news report. He states he had a stroke many years ago and new reports states baby aspirin is condraidicated. Please advise.

## 2019-02-15 DIAGNOSIS — I10 ESSENTIAL HYPERTENSION: ICD-10-CM

## 2019-02-18 RX ORDER — FELODIPINE 10 MG/1
TABLET, EXTENDED RELEASE ORAL
Qty: 90 TAB | Refills: 0 | Status: SHIPPED | OUTPATIENT
Start: 2019-02-18 | End: 2019-04-29 | Stop reason: SDUPTHER

## 2019-02-26 ENCOUNTER — OFFICE VISIT (OUTPATIENT)
Dept: INTERNAL MEDICINE CLINIC | Age: 84
End: 2019-02-26

## 2019-02-26 VITALS
DIASTOLIC BLOOD PRESSURE: 72 MMHG | SYSTOLIC BLOOD PRESSURE: 142 MMHG | RESPIRATION RATE: 18 BRPM | BODY MASS INDEX: 25.13 KG/M2 | HEART RATE: 64 BPM | OXYGEN SATURATION: 96 % | WEIGHT: 165.8 LBS | TEMPERATURE: 97.8 F | HEIGHT: 68 IN

## 2019-02-26 DIAGNOSIS — R35.1 BPH ASSOCIATED WITH NOCTURIA: ICD-10-CM

## 2019-02-26 DIAGNOSIS — N40.1 BPH ASSOCIATED WITH NOCTURIA: ICD-10-CM

## 2019-02-26 DIAGNOSIS — F51.01 PRIMARY INSOMNIA: ICD-10-CM

## 2019-02-26 DIAGNOSIS — I10 ESSENTIAL HYPERTENSION: Primary | ICD-10-CM

## 2019-02-26 NOTE — PROGRESS NOTES
Genoveva Ace is a 81 y.o. male who was seen in clinic today (2/26/2019). Assessment & Plan:  
Diagnoses and all orders for this visit: 1. Essential hypertension- well controlled for age, improved at home, continue current treatment, will check labs (CBC and CMP) 2. Primary insomnia- stable, continue current treatment, will review Valium from med list, will continue w/ life style changes and melatonin. 3. BPH associated with nocturia- stable, continue current treatment, will take over meds if he wants. We reviewed continuing vs stopping ASA 81mg, reviewed his risk factors, reviewed a likely TIA 20+ yrs ago w/o recurrence, will stop. Follow-up Disposition: Not on File Subjective:  
Kaylyn Callahan was seen today for Hypertension and Insomnia Cardiovascular Review The patient has hypertension. Since last visit: no changes. He reports taking medications as instructed, no medication side effects noted, home BP monitoring in range of 650'S systolic over 96'S diastolic. Diet and Lifestyle: generally follows a low fat low cholesterol diet, generally follows a low sodium diet, exercises regularly. Labs: reviewed and discussed with patient. Mental Health Review Patient is seen for insomnia. Since last visit has not needed to restart valium. Just using melatonin. He reports for the most part his sleep is okay. His biggest issue is trouble staying asleep. Reports experiences the following side effects from the treatment: none. Urology Review He is here today because of BPH. He reports his symptoms are stable. His symptoms include: nocturia x 1-2, weak stream (intermittent). He is on the following medications: Proscar. Currently using left over Alfluzosin but will change to Rapaflo. He reports the following medication side effects: none. Lab review: no lab studies available for review at time of visit. Brief Labs:  
 
Lab Results Component Value Date/Time Sodium 144 10/02/2017 04:54 PM  
 Potassium 4.7 10/02/2017 04:54 PM  
 Creatinine 0.83 10/02/2017 04:54 PM  
  
 
 
Prior to Admission medications Medication Sig Start Date End Date Taking? Authorizing Provider MELATONIN PO Take 3 mg by mouth nightly as needed. Yes Provider, Historical  
felodipine (PLENDIL SR) 10 mg 24 hr tablet Take 1 tablet by mouth  daily 2/18/19  Yes Mariusz Richmond MD  
finasteride (PROSCAR) 5 mg tablet Take 5 mg by mouth daily. 8/15/15  Yes Provider, Historical  
aspirin 81 mg tablet Take 81 mg by mouth daily. Yes Provider, Historical  
multivitamins-minerals-lutein (CENTRUM SILVER) Tab Take 1 Tab by mouth two (2) times a week. Yes Provider, Historical  
silodosin (RAPAFLO PO) Take  by mouth daily. Provider, Historical  
diazePAM (VALIUM) 5 mg tablet Take 0.5 Tabs by mouth nightly as needed for Sleep. Max Daily Amount: 2.5 mg. 1/15/18   Mariusz Richmond MD  
  
 
 
No Known Allergies Review of Systems Respiratory: Negative for cough and shortness of breath. Cardiovascular: Negative for chest pain and palpitations. Gastrointestinal: Negative for abdominal pain, constipation, diarrhea, nausea and vomiting. Endo/Heme/Allergies: Bruises/bleeds easily. Objective:  
Physical Exam  
Constitutional: No distress. Cardiovascular: Regular rhythm and normal heart sounds. Bradycardia present. No murmur heard. Pulmonary/Chest: Effort normal and breath sounds normal. He has no wheezes. He has no rales. Abdominal: Soft. Bowel sounds are normal. He exhibits no mass. There is no hepatosplenomegaly. There is no tenderness. Musculoskeletal: He exhibits no edema. Skin:  
Bruising on bilateral forearm Visit Vitals /72 Pulse 64 Temp 97.8 °F (36.6 °C) (Oral) Resp 18 Ht 5' 7.6\" (1.717 m) Wt 165 lb 12.8 oz (75.2 kg) SpO2 96% BMI 25.51 kg/m² Disclaimer: 
Advised him to call back or return to office if symptoms worsen/change/persist. 
Discussed expected course/resolution/complications of diagnosis in detail with patient. Medication risks/benefits/costs/interactions/alternatives discussed with patient. He was given an after visit summary which includes diagnoses, current medications, & vitals. He expressed understanding with the diagnosis and plan. Aspects of this note may have been generated using voice recognition software. Despite editing, there may be some syntax errors.   
 
 
Ramya Gr MD

## 2019-02-27 LAB
ALBUMIN SERPL-MCNC: 3.8 G/DL (ref 3.5–4.7)
ALBUMIN/GLOB SERPL: 1.7 {RATIO} (ref 1.2–2.2)
ALP SERPL-CCNC: 75 IU/L (ref 39–117)
ALT SERPL-CCNC: 16 IU/L (ref 0–44)
AST SERPL-CCNC: 18 IU/L (ref 0–40)
BILIRUB SERPL-MCNC: 0.4 MG/DL (ref 0–1.2)
BUN SERPL-MCNC: 13 MG/DL (ref 8–27)
BUN/CREAT SERPL: 14 (ref 10–24)
CALCIUM SERPL-MCNC: 9 MG/DL (ref 8.6–10.2)
CHLORIDE SERPL-SCNC: 106 MMOL/L (ref 96–106)
CO2 SERPL-SCNC: 23 MMOL/L (ref 20–29)
CREAT SERPL-MCNC: 0.96 MG/DL (ref 0.76–1.27)
ERYTHROCYTE [DISTWIDTH] IN BLOOD BY AUTOMATED COUNT: 13.4 % (ref 12.3–15.4)
GLOBULIN SER CALC-MCNC: 2.2 G/DL (ref 1.5–4.5)
GLUCOSE SERPL-MCNC: 89 MG/DL (ref 65–99)
HCT VFR BLD AUTO: 39.5 % (ref 37.5–51)
HGB BLD-MCNC: 15 G/DL (ref 13–17.7)
MCH RBC QN AUTO: 38.7 PG (ref 26.6–33)
MCHC RBC AUTO-ENTMCNC: 38 G/DL (ref 31.5–35.7)
MCV RBC AUTO: 102 FL (ref 79–97)
MORPHOLOGY BLD-IMP: ABNORMAL
PLATELET # BLD AUTO: 259 X10E3/UL (ref 150–379)
POTASSIUM SERPL-SCNC: 5.3 MMOL/L (ref 3.5–5.2)
PROT SERPL-MCNC: 6 G/DL (ref 6–8.5)
RBC # BLD AUTO: 3.88 X10E6/UL (ref 4.14–5.8)
SODIUM SERPL-SCNC: 141 MMOL/L (ref 134–144)
WBC # BLD AUTO: 5.6 X10E3/UL (ref 3.4–10.8)

## 2019-02-27 NOTE — PROGRESS NOTES
Letter sent to patient. All labs are stable or at goal for him. K is stable (normally high normal to just barely high, using MVI twice/wk, doubt related).

## 2019-04-23 ENCOUNTER — APPOINTMENT (OUTPATIENT)
Dept: GENERAL RADIOLOGY | Age: 84
DRG: 247 | End: 2019-04-23
Attending: INTERNAL MEDICINE
Payer: MEDICARE

## 2019-04-23 ENCOUNTER — HOSPITAL ENCOUNTER (INPATIENT)
Age: 84
LOS: 1 days | Discharge: HOME | DRG: 247 | End: 2019-04-24
Attending: EMERGENCY MEDICINE | Admitting: INTERNAL MEDICINE
Payer: MEDICARE

## 2019-04-23 VITALS
RESPIRATION RATE: 17 BRPM | WEIGHT: 160 LBS | BODY MASS INDEX: 24.62 KG/M2 | OXYGEN SATURATION: 95 % | DIASTOLIC BLOOD PRESSURE: 65 MMHG | HEART RATE: 63 BPM | SYSTOLIC BLOOD PRESSURE: 131 MMHG

## 2019-04-23 DIAGNOSIS — I21.3 ST ELEVATION MYOCARDIAL INFARCTION (STEMI), UNSPECIFIED ARTERY (HCC): ICD-10-CM

## 2019-04-23 PROBLEM — I21.02 ACUTE ST ELEVATION MYOCARDIAL INFARCTION (STEMI) INVOLVING LEFT ANTERIOR DESCENDING (LAD) CORONARY ARTERY (HCC): Status: ACTIVE | Noted: 2019-04-23

## 2019-04-23 LAB
ANION GAP SERPL CALC-SCNC: 4 MMOL/L (ref 5–15)
BUN SERPL-MCNC: 14 MG/DL (ref 6–20)
BUN/CREAT SERPL: 19 (ref 12–20)
CALCIUM SERPL-MCNC: 7.7 MG/DL (ref 8.5–10.1)
CHLORIDE SERPL-SCNC: 108 MMOL/L (ref 97–108)
CK MB CFR SERPL CALC: 5.2 % (ref 0–2.5)
CK MB SERPL-MCNC: 12.6 NG/ML (ref 5–25)
CK SERPL-CCNC: 243 U/L (ref 39–308)
CO2 SERPL-SCNC: 24 MMOL/L (ref 21–32)
CREAT SERPL-MCNC: 0.74 MG/DL (ref 0.7–1.3)
ERYTHROCYTE [DISTWIDTH] IN BLOOD BY AUTOMATED COUNT: 12.9 % (ref 11.5–14.5)
GLUCOSE SERPL-MCNC: 131 MG/DL (ref 65–100)
HCT VFR BLD AUTO: 42.2 % (ref 36.6–50.3)
HGB BLD-MCNC: 13.6 G/DL (ref 12.1–17)
INR PPP: 6.2 (ref 0.9–1.1)
MCH RBC QN AUTO: 32.2 PG (ref 26–34)
MCHC RBC AUTO-ENTMCNC: 32.2 G/DL (ref 30–36.5)
MCV RBC AUTO: 99.8 FL (ref 80–99)
NRBC # BLD: 0 K/UL (ref 0–0.01)
NRBC BLD-RTO: 0 PER 100 WBC
PLATELET # BLD AUTO: 223 K/UL (ref 150–400)
PMV BLD AUTO: 11.1 FL (ref 8.9–12.9)
POTASSIUM SERPL-SCNC: 3.2 MMOL/L (ref 3.5–5.1)
PROTHROMBIN TIME: 56.8 SEC (ref 9–11.1)
RBC # BLD AUTO: 4.23 M/UL (ref 4.1–5.7)
SODIUM SERPL-SCNC: 136 MMOL/L (ref 136–145)
TROPONIN I SERPL-MCNC: 2.78 NG/ML
WBC # BLD AUTO: 10.4 K/UL (ref 4.1–11.1)

## 2019-04-23 PROCEDURE — C1725 CATH, TRANSLUMIN NON-LASER: HCPCS | Performed by: INTERNAL MEDICINE

## 2019-04-23 PROCEDURE — 74011250636 HC RX REV CODE- 250/636: Performed by: INTERNAL MEDICINE

## 2019-04-23 PROCEDURE — C1894 INTRO/SHEATH, NON-LASER: HCPCS | Performed by: INTERNAL MEDICINE

## 2019-04-23 PROCEDURE — C1769 GUIDE WIRE: HCPCS | Performed by: INTERNAL MEDICINE

## 2019-04-23 PROCEDURE — 99152 MOD SED SAME PHYS/QHP 5/>YRS: CPT | Performed by: INTERNAL MEDICINE

## 2019-04-23 PROCEDURE — 36415 COLL VENOUS BLD VENIPUNCTURE: CPT

## 2019-04-23 PROCEDURE — 71045 X-RAY EXAM CHEST 1 VIEW: CPT

## 2019-04-23 PROCEDURE — 65620000000 HC RM CCU GENERAL

## 2019-04-23 PROCEDURE — 92928 PRQ TCAT PLMT NTRAC ST 1 LES: CPT | Performed by: INTERNAL MEDICINE

## 2019-04-23 PROCEDURE — 85027 COMPLETE CBC AUTOMATED: CPT

## 2019-04-23 PROCEDURE — 92941 PRQ TRLML REVSC TOT OCCL AMI: CPT | Performed by: INTERNAL MEDICINE

## 2019-04-23 PROCEDURE — 77030013744: Performed by: INTERNAL MEDICINE

## 2019-04-23 PROCEDURE — 80048 BASIC METABOLIC PNL TOTAL CA: CPT

## 2019-04-23 PROCEDURE — 84484 ASSAY OF TROPONIN QUANT: CPT

## 2019-04-23 PROCEDURE — 99153 MOD SED SAME PHYS/QHP EA: CPT | Performed by: INTERNAL MEDICINE

## 2019-04-23 PROCEDURE — 74011000250 HC RX REV CODE- 250: Performed by: INTERNAL MEDICINE

## 2019-04-23 PROCEDURE — C1874 STENT, COATED/COV W/DEL SYS: HCPCS | Performed by: INTERNAL MEDICINE

## 2019-04-23 PROCEDURE — 82550 ASSAY OF CK (CPK): CPT

## 2019-04-23 PROCEDURE — 93005 ELECTROCARDIOGRAM TRACING: CPT

## 2019-04-23 PROCEDURE — 77030004532 HC CATH ANGI DX IMP BSC -A: Performed by: INTERNAL MEDICINE

## 2019-04-23 PROCEDURE — C1887 CATHETER, GUIDING: HCPCS | Performed by: INTERNAL MEDICINE

## 2019-04-23 PROCEDURE — 85610 PROTHROMBIN TIME: CPT

## 2019-04-23 PROCEDURE — 99285 EMERGENCY DEPT VISIT HI MDM: CPT

## 2019-04-23 PROCEDURE — 74011250636 HC RX REV CODE- 250/636

## 2019-04-23 PROCEDURE — 74011636320 HC RX REV CODE- 636/320: Performed by: INTERNAL MEDICINE

## 2019-04-23 PROCEDURE — 77030013715 HC INFL SYS MRTM -B: Performed by: INTERNAL MEDICINE

## 2019-04-23 PROCEDURE — C1760 CLOSURE DEV, VASC: HCPCS | Performed by: INTERNAL MEDICINE

## 2019-04-23 PROCEDURE — 027136Z DILATION OF CORONARY ARTERY, TWO ARTERIES WITH THREE DRUG-ELUTING INTRALUMINAL DEVICES, PERCUTANEOUS APPROACH: ICD-10-PCS | Performed by: INTERNAL MEDICINE

## 2019-04-23 PROCEDURE — 74011000258 HC RX REV CODE- 258: Performed by: INTERNAL MEDICINE

## 2019-04-23 PROCEDURE — 93454 CORONARY ARTERY ANGIO S&I: CPT | Performed by: INTERNAL MEDICINE

## 2019-04-23 PROCEDURE — 74011250637 HC RX REV CODE- 250/637: Performed by: INTERNAL MEDICINE

## 2019-04-23 DEVICE — STENT RONYX30022W RESOLUTE ONYX 3.00X22
Type: IMPLANTABLE DEVICE | Status: FUNCTIONAL
Brand: RESOLUTE ONYX™

## 2019-04-23 DEVICE — STENT RONYX20018W RESOLUTE ONYX 2.00X18
Type: IMPLANTABLE DEVICE | Status: FUNCTIONAL
Brand: RESOLUTE ONYX™

## 2019-04-23 DEVICE — STENT RONYX25015W RESOLUTE ONYX 2.50X15
Type: IMPLANTABLE DEVICE | Status: FUNCTIONAL
Brand: RESOLUTE ONYX™

## 2019-04-23 RX ORDER — ATORVASTATIN CALCIUM 10 MG/1
10 TABLET, FILM COATED ORAL
Status: DISCONTINUED | OUTPATIENT
Start: 2019-04-23 | End: 2019-04-24 | Stop reason: HOSPADM

## 2019-04-23 RX ORDER — MIDAZOLAM HYDROCHLORIDE 1 MG/ML
INJECTION, SOLUTION INTRAMUSCULAR; INTRAVENOUS AS NEEDED
Status: DISCONTINUED | OUTPATIENT
Start: 2019-04-23 | End: 2019-04-23 | Stop reason: HOSPADM

## 2019-04-23 RX ORDER — EPTIFIBATIDE 0.75 MG/ML
INJECTION, SOLUTION INTRAVENOUS
Status: COMPLETED | OUTPATIENT
Start: 2019-04-23 | End: 2019-04-23

## 2019-04-23 RX ORDER — FENTANYL CITRATE 50 UG/ML
INJECTION, SOLUTION INTRAMUSCULAR; INTRAVENOUS AS NEEDED
Status: DISCONTINUED | OUTPATIENT
Start: 2019-04-23 | End: 2019-04-23 | Stop reason: HOSPADM

## 2019-04-23 RX ORDER — SODIUM CHLORIDE 9 MG/ML
INJECTION, SOLUTION INTRAVENOUS
Status: COMPLETED | OUTPATIENT
Start: 2019-04-23 | End: 2019-04-23

## 2019-04-23 RX ORDER — ZOLPIDEM TARTRATE 5 MG/1
5 TABLET ORAL
Status: DISCONTINUED | OUTPATIENT
Start: 2019-04-23 | End: 2019-04-24 | Stop reason: HOSPADM

## 2019-04-23 RX ORDER — LIDOCAINE HYDROCHLORIDE 10 MG/ML
INJECTION INFILTRATION; PERINEURAL AS NEEDED
Status: DISCONTINUED | OUTPATIENT
Start: 2019-04-23 | End: 2019-04-23 | Stop reason: HOSPADM

## 2019-04-23 RX ORDER — NITROGLYCERIN 0.4 MG/1
0.4 TABLET SUBLINGUAL
Status: DISCONTINUED | OUTPATIENT
Start: 2019-04-23 | End: 2019-04-24 | Stop reason: HOSPADM

## 2019-04-23 RX ORDER — FINASTERIDE 5 MG/1
5 TABLET, FILM COATED ORAL DAILY
COMMUNITY
End: 2019-04-26 | Stop reason: SDUPTHER

## 2019-04-23 RX ORDER — HEPARIN SODIUM 200 [USP'U]/100ML
INJECTION, SOLUTION INTRAVENOUS
Status: COMPLETED | OUTPATIENT
Start: 2019-04-23 | End: 2019-04-23

## 2019-04-23 RX ORDER — EPTIFIBATIDE 0.75 MG/ML
2 INJECTION, SOLUTION INTRAVENOUS CONTINUOUS
Status: DISCONTINUED | OUTPATIENT
Start: 2019-04-23 | End: 2019-04-23 | Stop reason: HOSPADM

## 2019-04-23 RX ORDER — SILODOSIN 8 MG/1
8 CAPSULE ORAL
COMMUNITY
End: 2019-04-26 | Stop reason: SDUPTHER

## 2019-04-23 RX ORDER — POTASSIUM CHLORIDE 750 MG/1
20 TABLET, FILM COATED, EXTENDED RELEASE ORAL ONCE
Status: COMPLETED | OUTPATIENT
Start: 2019-04-23 | End: 2019-04-23

## 2019-04-23 RX ORDER — SODIUM CHLORIDE 0.9 % (FLUSH) 0.9 %
5-40 SYRINGE (ML) INJECTION EVERY 8 HOURS
Status: DISCONTINUED | OUTPATIENT
Start: 2019-04-23 | End: 2019-04-24 | Stop reason: HOSPADM

## 2019-04-23 RX ORDER — METOPROLOL SUCCINATE 25 MG/1
25 TABLET, EXTENDED RELEASE ORAL DAILY
Status: DISCONTINUED | OUTPATIENT
Start: 2019-04-23 | End: 2019-04-24 | Stop reason: HOSPADM

## 2019-04-23 RX ORDER — PHENYLEPHRINE HCL IN 0.9% NACL 1 MG/10 ML
SYRINGE (ML) INTRAVENOUS AS NEEDED
Status: DISCONTINUED | OUTPATIENT
Start: 2019-04-23 | End: 2019-04-23 | Stop reason: HOSPADM

## 2019-04-23 RX ORDER — FELODIPINE 10 MG/1
10 TABLET, EXTENDED RELEASE ORAL DAILY
COMMUNITY
End: 2019-04-26 | Stop reason: SDUPTHER

## 2019-04-23 RX ORDER — GUAIFENESIN 100 MG/5ML
81 LIQUID (ML) ORAL DAILY
Status: DISCONTINUED | OUTPATIENT
Start: 2019-04-24 | End: 2019-04-24 | Stop reason: HOSPADM

## 2019-04-23 RX ORDER — SODIUM CHLORIDE 9 MG/ML
1.5 INJECTION, SOLUTION INTRAVENOUS CONTINUOUS
Status: DISCONTINUED | OUTPATIENT
Start: 2019-04-23 | End: 2019-04-23

## 2019-04-23 RX ORDER — EPTIFIBATIDE 0.75 MG/ML
2 INJECTION, SOLUTION INTRAVENOUS CONTINUOUS
Status: DISPENSED | OUTPATIENT
Start: 2019-04-23 | End: 2019-04-24

## 2019-04-23 RX ORDER — SODIUM CHLORIDE 0.9 % (FLUSH) 0.9 %
5-40 SYRINGE (ML) INJECTION AS NEEDED
Status: DISCONTINUED | OUTPATIENT
Start: 2019-04-23 | End: 2019-04-24 | Stop reason: HOSPADM

## 2019-04-23 RX ADMIN — ATORVASTATIN CALCIUM 10 MG: 10 TABLET, FILM COATED ORAL at 21:45

## 2019-04-23 RX ADMIN — SODIUM CHLORIDE 1 G: 900 INJECTION, SOLUTION INTRAVENOUS at 20:16

## 2019-04-23 RX ADMIN — Medication 10 ML: at 21:45

## 2019-04-23 RX ADMIN — METOPROLOL SUCCINATE 25 MG: 25 TABLET, EXTENDED RELEASE ORAL at 18:03

## 2019-04-23 RX ADMIN — POTASSIUM CHLORIDE 20 MEQ: 750 TABLET, EXTENDED RELEASE ORAL at 19:28

## 2019-04-23 RX ADMIN — EPTIFIBATIDE 2 MCG/KG/MIN: 0.75 INJECTION, SOLUTION INTRAVENOUS at 17:04

## 2019-04-23 RX ADMIN — EPTIFIBATIDE 2 MCG/KG/MIN: 0.75 INJECTION, SOLUTION INTRAVENOUS at 20:15

## 2019-04-23 RX ADMIN — Medication 10 ML: at 18:03

## 2019-04-23 NOTE — PROGRESS NOTES
TRANSFER - IN REPORT: 
 
Verbal report received from CCL(name) on Marta Aparicio  being received from CCL(unit) for routine progression of care Report consisted of patients Situation, Background, Assessment and  
Recommendations(SBAR). Information from the following report(s) SBAR, Kardex, Procedure Summary, Intake/Output, MAR, Recent Results and Cardiac Rhythm NSR was reviewed with the receiving nurse. Opportunity for questions and clarification was provided. Assessment completed upon patients arrival to unit and care assumed. Primary Nurse Ivette Donald and Kvng Smith RN performed a dual skin assessment on this patient No impairment noted Current Bed:  
Total Care SPORT (air with burgundy cover) Ricco score is 17 
 
1700 Dr. Aracelis Fuchs at bedside - orders received to stop MARI protocol & continue integralin x 12 hrs 
1740 Critical INR - spoke w/ Dr. Aracelis Fuchs - elevated INR due to angiomax given in CCL during procedure 1845 Paged on call cards d/t K 3.2 & Ca 7.7 
1930 Bedside and Verbal shift change report given to Nico Nick RN (oncoming nurse) by Kenney Hannah RN (offgoing nurse). Report included the following information SBAR, Kardex, Procedure Summary, Intake/Output, MAR, Recent Results and Cardiac Rhythm NSR.

## 2019-04-23 NOTE — PROGRESS NOTES
TRANSFER - OUT REPORT:    Verbal report given to DARRION Vasquez(name) on Vinicius Sue  being transferred to CCU bed 20(unit) for routine progression of care       Report consisted of patients Situation, Background, Assessment and   Recommendations(SBAR). Information from the following report(s) SBAR, Procedure Summary, MAR and Recent Results was reviewed with the receiving nurse. Lines:       Opportunity for questions and clarification was provided.       Patient transported with:   Monitor  Registered Nurse  Tech

## 2019-04-23 NOTE — ED TRIAGE NOTES
Triage Note: Patient is coming in with chest pain since last night. 325 mg Aspirin, 3 SL NTG. Dr. Gerald Keane and cath team here when patient arrived and took immediately to the cath lab with EMS.

## 2019-04-23 NOTE — PROGRESS NOTES
Spiritual Care Assessment/Progress Note [address] 
 
 
NAME: Larry Bray      MRN: [id number] AGE: 81 y.o. SEX: male Oriental orthodox Affiliation:   
Language:   
 
4/23/2019     Total Time (in minutes): 35  
 
Spiritual Assessment begun in [facility] through conversation with: 
  
    [x]Patient        [x] Family    [] Friend(s) Reason for Consult: Crisis, [place] [facility] Spiritual beliefs: (Please include comment if needed) 
   [] Identifies with a bentley tradition:     
   [] Supported by a bentley community:        
   [] Claims no spiritual orientation:       
   [] Seeking spiritual identity:            
   [x] Adheres to an individual form of spirituality:       
   [] Not able to assess:                   
 
    
Identified resources for coping:  
   [] Prayer                           
   [] Music                  [] Guided Imagery [x] Family/friends                 [] Pet visits [] Devotional reading                         [] Unknown 
   [] Other:                                          
 
 
Interventions offered during this visit: (See comments for more details) Patient Interventions: Crisis, Stemi Family/Friend(s): Affirmation of emotions/emotional suffering, Affirmation of bentley, Prayer (assurance of), Normalization of emotional/spiritual concerns Plan of Care: 
 
 [x] Support spiritual and/or cultural needs  
 [] Support AMD and/or advance care planning process    
 [] Support grieving process 
 [] Coordinate Rites and/or Rituals  
 [] Coordination with community clergy [] No spiritual needs identified at this time 
 [] Detailed Plan of Care below (See Comments)  [] Make referral to Music Therapy 
[] Make referral to Pet Therapy    
[] Make referral to Addiction services 
[] Make referral to [facility] 
[] Make referral to Spiritual Care Partner 
[] No future visits requested       
[x] Follow up visits as needed Comments:  responded to Code Stemi in ED. Patient arrived and was taken straight to Cath Lab.  waited in ED for patients wife who was on the way to the ED. When the wife arrived,  took wife to the Cath Lab waiting area and informed staff that the wife was now here and in the Cath Lab waiting area.  waited with the wife and visited with her to keep her comfortable for a few minutes.  provided pastoral care and support to the patient and wife. Spiritual Care will follow up as needed and able. Perlita Tello MDiv Pager: [phone]

## 2019-04-23 NOTE — H&P
Cardiology History and Physical 
 
 Date of  Admission: 4/23/2019 Admission type: Emergency Moises Staples is a 81 y.o. male admitted for STEMI (ST elevation myocardial infarction) ([facility] [I21.3] STEMI (ST elevation myocardial infarction) ([facility] [I21.3]. Patient complains of chest pain like a serena-horse on Lt side of chest started this am. He thought he pulled a muscle. He could not get comfortable all morning due to intermittent recurrence of the pain and then one hour prior to arrival he had severe SSCP like tightness rated 10/10 scale with sweats and SOB and weakness. He reports of radiation into his Lt jaw now. EKG en route shows marked ST elevation in anterolateral leads upto 4mm. No prior cardiac history. Patient Active Problem List  
 Diagnosis Date Noted  STEMI (ST elevation myocardial infarction) ([facility] 04/23/2019 Priority: 1 - One No primary care provider on file. No past medical history on file. No past surgical history on file. No family history on file. Prior to Admission medications Not on File No Known Allergies Review of Systems Review of Systems Except as noted in HPI, patient denies recent fever or chills, nausea, vomiting, diarrhea, hemoptysis, hematemesis, dysuria, myalgias, focal neurologic symptoms, ecchymosis, angioedema, odynophagia, dysphagia, sore throat, earache,rash, melena, hematochezia, depression, GERD, cold intolerance, petechia, bleeding gums, or significant weight loss. A comprehensive review of systems was negative except for that written in the HPI. Physical Exam 
 
Objective:  
 Physical Exam: 
24 hr VS reviewed, overall VSSAF No data recorded. No data found. No data found. No data found. No intake or output data in the 24 hours ending 04/23/19 1630 General Appearance:   [x] well developed, well nourished,  [x]NAD. []     agitated   []     lethargic but arousable  []     obtunded ENT, Palate:    [x] WNL   []     dry palate/MM     [x]anicteric Respiratory:    [x]CTA bilateral  [] rales  [] rhonchi  [] normal resp effort Cardiovascular:   [x] RRR   [] Irregular rate and rhythm  [] ectopy [x] Normal S1, S2   [x]  S4  gallop noted 
 [] no murmur   [] murmur c/w: 
 [x]  no edema RLE:[]1+ [] 2+ []3+; LLE:[]1+ []2+ [] 3+ [x]  normal JVP   []     Elevated JVP [x] carotid upstroke nl 
 [x] abd aorta not palpated 
 [x] no stigmata of peripheral emboli GI:    [x] abd soft, non-distented,bowel sounds present, no                     organomegaly appreciated Skin: 
Neuro: 
 
  [x]  warm and dry []     cold extremities [x]  A/O x 3,  [x]     grossly non-focal  
 []  lethargic but arousable  [] obtunded Cardiographics Telemetry: normal sinus rhythm ECG: normal EKG, normal sinus rhythm, unchanged from previous tracings Echocardiogram: Not done Labs: No results found for this or any previous visit (from the past 24 hour(s)). Assessment: 
 
 Assessment:  
  
Active Problems: STEMI (ST elevation myocardial infarction) ([facility] (4/23/2019) Plan: To cath lab ASAP Signed By: Vivien Ardon MD   
 April 23, 2019

## 2019-04-23 NOTE — PROGRESS NOTES
Cardiac Cath Lab Procedure Area Arrival Note:    Vinicius Sue arrived to Cardiac Cath Lab, Procedure Area. Patient identifiers verified with NAME and DATE OF BIRTH. Procedure verified with patient. Consent forms verified. Allergies verified. Patient informed of procedure and plan of care. Questions answered with review. Patient voiced understanding of procedure and plan of care. Patient on cardiac monitor, non-invasive blood pressure, SPO2 monitor. On RA and placed on O2 @ 2 lpm via NC.  IV of NSS on pump at 100 ml/hr. Patient status doing well, but complaining of chest pain. Patient is A&Ox 4. Patient reports 8/10m midsternal chest pain with radiation to LUE. Patient medicated during procedure with orders obtained and verified by Dr. Al Murillo. Refer to patients Cardiac Cath Lab PROCEDURE REPORT for vital signs, assessment, status, and response during procedure, printed at end of case. Printed report on chart or scanned into chart.

## 2019-04-24 ENCOUNTER — HOSPITAL ENCOUNTER (OUTPATIENT)
Age: 84
Setting detail: OUTPATIENT SURGERY
Discharge: HOME | DRG: 247 | End: 2019-04-24
Attending: INTERNAL MEDICINE | Admitting: INTERNAL MEDICINE
Payer: MEDICARE

## 2019-04-24 ENCOUNTER — APPOINTMENT (OUTPATIENT)
Dept: NON INVASIVE DIAGNOSTICS | Age: 84
DRG: 247 | End: 2019-04-24
Attending: INTERNAL MEDICINE
Payer: MEDICARE

## 2019-04-24 ENCOUNTER — HOME HEALTH ADMISSION (OUTPATIENT)
Dept: HOME HEALTH SERVICES | Facility: HOME HEALTH | Age: 84
End: 2019-04-24

## 2019-04-24 VITALS
HEART RATE: 67 BPM | HEIGHT: 72 IN | SYSTOLIC BLOOD PRESSURE: 134 MMHG | RESPIRATION RATE: 20 BRPM | WEIGHT: 160 LBS | OXYGEN SATURATION: 98 % | BODY MASS INDEX: 21.67 KG/M2 | DIASTOLIC BLOOD PRESSURE: 62 MMHG | TEMPERATURE: 97.6 F

## 2019-04-24 DIAGNOSIS — I21.3 ST ELEVATION (STEMI) MYOCARDIAL INFARCTION (HCC): ICD-10-CM

## 2019-04-24 LAB
ATRIAL RATE: 55 BPM
ATRIAL RATE: 62 BPM
CALCULATED P AXIS, ECG09: 64 DEGREES
CALCULATED P AXIS, ECG09: 74 DEGREES
CALCULATED R AXIS, ECG10: -7 DEGREES
CALCULATED R AXIS, ECG10: 16 DEGREES
CALCULATED T AXIS, ECG11: 44 DEGREES
CALCULATED T AXIS, ECG11: 47 DEGREES
DIAGNOSIS, 93000: NORMAL
DIAGNOSIS, 93000: NORMAL
ECHO AO ROOT DIAM: 2.94 CM
ECHO LA MAJOR AXIS: 3.37 CM
ECHO LA TO AORTIC ROOT RATIO: 1.15
ECHO LV E' SEPTAL VELOCITY: 5.08 CM/S
ECHO LV INTERNAL DIMENSION DIASTOLIC: 5.28 CM (ref 4.2–5.9)
ECHO LV INTERNAL DIMENSION SYSTOLIC: 3.12 CM
ECHO LV IVSD: 0.91 CM (ref 0.6–1)
ECHO LV MASS 2D: 199.9 G (ref 88–224)
ECHO LV MASS INDEX 2D: 103.2 G/M2 (ref 49–115)
ECHO LV POSTERIOR WALL DIASTOLIC: 0.87 CM (ref 0.6–1)
ECHO MV A VELOCITY: 76.23 CM/S
ECHO MV AREA PHT: 2.9 CM2
ECHO MV E DECELERATION TIME (DT): 262.2 MS
ECHO MV E VELOCITY: 61.48 CM/S
ECHO MV E/A RATIO: 0.81
ECHO MV E/E' SEPTAL: 12.1
ECHO MV PRESSURE HALF TIME (PHT): 76 MS
P-R INTERVAL, ECG05: 184 MS
P-R INTERVAL, ECG05: 190 MS
Q-T INTERVAL, ECG07: 432 MS
Q-T INTERVAL, ECG07: 458 MS
QRS DURATION, ECG06: 80 MS
QRS DURATION, ECG06: 90 MS
QTC CALCULATION (BEZET), ECG08: 413 MS
QTC CALCULATION (BEZET), ECG08: 464 MS
TROPONIN I SERPL-MCNC: 80.2 NG/ML
VENTRICULAR RATE, ECG03: 55 BPM
VENTRICULAR RATE, ECG03: 62 BPM

## 2019-04-24 PROCEDURE — 74011000250 HC RX REV CODE- 250

## 2019-04-24 PROCEDURE — B246ZZ4 ULTRASONOGRAPHY OF RIGHT AND LEFT HEART, TRANSESOPHAGEAL: ICD-10-PCS | Performed by: INTERNAL MEDICINE

## 2019-04-24 PROCEDURE — 93005 ELECTROCARDIOGRAM TRACING: CPT

## 2019-04-24 PROCEDURE — 84484 ASSAY OF TROPONIN QUANT: CPT

## 2019-04-24 PROCEDURE — 93306 TTE W/DOPPLER COMPLETE: CPT

## 2019-04-24 PROCEDURE — 74011250637 HC RX REV CODE- 250/637: Performed by: INTERNAL MEDICINE

## 2019-04-24 PROCEDURE — 74011250636 HC RX REV CODE- 250/636

## 2019-04-24 PROCEDURE — 36415 COLL VENOUS BLD VENIPUNCTURE: CPT

## 2019-04-24 RX ORDER — GUAIFENESIN 100 MG/5ML
81 LIQUID (ML) ORAL DAILY
Qty: 30 TAB | Refills: 6 | Status: SHIPPED | OUTPATIENT
Start: 2019-04-25

## 2019-04-24 RX ORDER — ATORVASTATIN CALCIUM 10 MG/1
10 TABLET, FILM COATED ORAL
Qty: 30 TAB | Refills: 6 | Status: SHIPPED | OUTPATIENT
Start: 2019-04-24 | End: 2020-10-07 | Stop reason: ALTCHOICE

## 2019-04-24 RX ORDER — METOPROLOL SUCCINATE 25 MG/1
25 TABLET, EXTENDED RELEASE ORAL DAILY
Qty: 30 TAB | Refills: 6 | Status: SHIPPED | OUTPATIENT
Start: 2019-04-25 | End: 2020-10-07 | Stop reason: DRUGHIGH

## 2019-04-24 RX ADMIN — ASPIRIN 81 MG 81 MG: 81 TABLET ORAL at 09:11

## 2019-04-24 RX ADMIN — METOPROLOL SUCCINATE 25 MG: 25 TABLET, EXTENDED RELEASE ORAL at 09:11

## 2019-04-24 RX ADMIN — TICAGRELOR 180 MG: 90 TABLET ORAL at 13:09

## 2019-04-24 NOTE — Clinical Note
Lesion located in the Mid LAD Distal Diag 1. Balloon inserted. Balloon inflated using single inflation technique. Pressure = 8 shannon; Duration = 9 sec.

## 2019-04-24 NOTE — PROGRESS NOTES
0730 Bedside and Verbal shift change report given to DARRION Vasquez (oncoming nurse) by Sekou Lo RN (offgoing nurse). Report included the following information SBAR, Kardex, Intake/Output, MAR, Recent Results and Cardiac Rhythm NSR.  
1200 Pt went for walk - tolerated well & now sitting in chair 1445 Discharge instructions were reviewed and given to the patient & his wife. Patient given a current medication reconciliation form and verbalized understanding of their medications, side affects, medication administration, and time when due. Importance of follow-up and appointment times and dates reviewed. The patient verbalized understanding of discharge instructions and prescriptions, all questions were answered. The patient and his wife have no further concerns at this time. Patient stable at time of discharge. The patient taken via wheelchair transport to patient  area. Pt transported out of unit and in no acute distress.

## 2019-04-24 NOTE — PROGRESS NOTES
Problem: Falls - Risk of 
Goal: *Absence of Falls Description Document Ilir Zarate Fall Risk and appropriate interventions in the flowsheet. Outcome: Progressing Towards Goal 
  
Problem: Patient Education: Go to Patient Education Activity Goal: Patient/Family Education Outcome: Progressing Towards Goal 
  
Problem: Pressure Injury - Risk of 
Goal: *Prevention of pressure injury Description Document Ricco Scale and appropriate interventions in the flowsheet. Outcome: Progressing Towards Goal 
  
Problem: Patient Education: Go to Patient Education Activity Goal: Patient/Family Education Outcome: Progressing Towards Goal 
  
Problem: Pain Goal: *Control of Pain Outcome: Progressing Towards Goal 
Goal: *PALLIATIVE CARE:  Alleviation of Pain Outcome: Progressing Towards Goal 
  
Problem: Patient Education: Go to Patient Education Activity Goal: Patient/Family Education Outcome: Progressing Towards Goal 
  
Problem: Infection - Risk of, Multi-drug Resistant Organism Colonization (MDRO) Goal: *Absence of MDRO colonization Outcome: Progressing Towards Goal 
Goal: *Absence of infection signs and symptoms Outcome: Progressing Towards Goal 
  
Problem: Patient Education: Go to Patient Education Activity Goal: Patient/Family Education Outcome: Progressing Towards Goal

## 2019-04-24 NOTE — Clinical Note
Lesion located in the Distal LAD. Balloon inserted. Balloon inflated using multiple inflations inflation technique. Pressure = 9 shannon; Duration = 47 sec. Inflation 2: Pressure: 11 shannon; Duration: 50 sec.

## 2019-04-24 NOTE — Clinical Note
Lesion: Located in the Proximal Cx. Stent inserted. Single technique used. First inflation pressure = 12 shannon; inflation time: 14 sec.

## 2019-04-24 NOTE — PROGRESS NOTES
Hospital follow-up PCP transitional care appointment has been scheduled with Dr. Dexter Veras for Monday, 4/29/19 at 1:00 p.m. Pending patient discharge.   Parvin Franco, Care Management Specialist.

## 2019-04-24 NOTE — PROGRESS NOTES
Problem: Falls - Risk of 
Goal: *Absence of Falls Description Document Laura Marvin Fall Risk and appropriate interventions in the flowsheet. Outcome: Resolved/Met Problem: Patient Education: Go to Patient Education Activity Goal: Patient/Family Education Outcome: Resolved/Met Problem: Pressure Injury - Risk of 
Goal: *Prevention of pressure injury Description Document Rcico Scale and appropriate interventions in the flowsheet. Outcome: Resolved/Met Problem: Patient Education: Go to Patient Education Activity Goal: Patient/Family Education Outcome: Resolved/Met Problem: Cath Lab Procedures: Post-Cath Day 1 Goal: Off Pathway (Use only if patient is Off Pathway) Outcome: Resolved/Met Goal: Activity/Safety Outcome: Resolved/Met Goal: Diagnostic Test/Procedures Outcome: Resolved/Met Goal: Nutrition/Diet Outcome: Resolved/Met Goal: Discharge Planning Outcome: Resolved/Met Goal: Medications Outcome: Resolved/Met Goal: Respiratory Outcome: Resolved/Met Goal: Treatments/Interventions/Procedures Outcome: Resolved/Met Goal: Psychosocial 
Outcome: Resolved/Met Problem: Cath Lab Procedures: Discharge Outcomes Goal: *Stable cardiac rhythm Outcome: Resolved/Met Goal: *Hemodynamically stable Outcome: Resolved/Met Goal: *Optimal pain control at patient's stated goal 
Outcome: Resolved/Met Goal: *Pulses palpable, skin color within defined limits, skin temperature warm Outcome: Resolved/Met Goal: *Lungs clear or at baseline Outcome: Resolved/Met Goal: *Demonstrates ability to perform prescribed activity without shortness of breath or discomfort Outcome: Resolved/Met Goal: *Verbalizes home exercise program, activity guidelines, cardiac precautions Outcome: Resolved/Met Goal: *Verbalizes understanding and describes prescribed diet Outcome: Resolved/Met Goal: *Verbalizes understanding and describes medication purposes and frequencies Outcome: Resolved/Met Goal: *Identifies cardiac risk factors Outcome: Resolved/Met Goal: *No signs and symptoms of infection or wound complications Outcome: Resolved/Met Goal: *Anxiety reduced or absent Outcome: Resolved/Met Goal: *Verbalizes and demonstrates incision care Outcome: Resolved/Met Goal: *Understands and describes signs and symptoms to report to providers(Stroke Metric) Outcome: Resolved/Met Goal: *Describes follow-up/return visits to physicians Outcome: Resolved/Met Goal: *Describes available resources and support systems Outcome: Resolved/Met Goal: *Influenza immunization Outcome: Resolved/Met Goal: *Pneumococcal immunization Outcome: Resolved/Met Problem: Pain Goal: *Control of Pain Outcome: Resolved/Met Goal: *PALLIATIVE CARE:  Alleviation of Pain Outcome: Resolved/Met Problem: Patient Education: Go to Patient Education Activity Goal: Patient/Family Education Outcome: Resolved/Met Problem: Infection - Risk of, Multi-drug Resistant Organism Colonization (MDRO) Goal: *Absence of MDRO colonization Outcome: Resolved/Met Goal: *Absence of infection signs and symptoms Outcome: Resolved/Met Problem: Patient Education: Go to Patient Education Activity Goal: Patient/Family Education Outcome: Resolved/Met Problem: Patient Education: Go to Patient Education Activity Goal: Patient/Family Education Outcome: Resolved/Met Problem: AMI: Day 1 Goal: Off Pathway (Use only if patient is Off Pathway) Outcome: Resolved/Met Goal: Activity/Safety Outcome: Resolved/Met Goal: Consults, if ordered Outcome: Resolved/Met Goal: Diagnostic Test/Procedures Outcome: Resolved/Met Goal: Nutrition/Diet Outcome: Resolved/Met Goal: Discharge Planning Outcome: Resolved/Met Goal: Medications Outcome: Resolved/Met Goal: Respiratory Outcome: Resolved/Met Goal: Treatments/Interventions/Procedures Outcome: Resolved/Met Goal: Psychosocial 
Outcome: Resolved/Met Goal: *Eligible patient receives reperfusion therapy thrombolytics/PCI Outcome: Resolved/Met Goal: *Optimal pain control at patient's stated goal 
Outcome: Resolved/Met Goal: *Hemodynamically stable Outcome: Resolved/Met Goal: *Received aspirin and beta-blocker within 24 hours of arrival unless contraindicated Outcome: Resolved/Met Problem: AMI: Day 2 Goal: Off Pathway (Use only if patient is Off Pathway) Outcome: Resolved/Met Goal: Activity/Safety Outcome: Resolved/Met Goal: Consults, if ordered Outcome: Resolved/Met Goal: Diagnostic Test/Procedures Outcome: Resolved/Met Goal: Nutrition/Diet Outcome: Resolved/Met Goal: Discharge Planning Outcome: Resolved/Met Goal: Medications Outcome: Resolved/Met Goal: Respiratory Outcome: Resolved/Met Goal: Treatments/Interventions/Procedures Outcome: Resolved/Met Goal: Psychosocial 
Outcome: Resolved/Met Goal: *Optimal pain control at patient's stated goal 
Outcome: Resolved/Met Goal: *Hemodynamically stable Outcome: Resolved/Met Goal: *Demonstrates progressive activity Outcome: Resolved/Met Goal: *Tolerating diet Outcome: Resolved/Met Problem: AMI: Day 3 Goal: Off Pathway (Use only if patient is Off Pathway) Outcome: Resolved/Met Goal: Activity/Safety Outcome: Resolved/Met Goal: Consults, if ordered Outcome: Resolved/Met Goal: Diagnostic Test/Procedures Outcome: Resolved/Met Goal: Nutrition/Diet Outcome: Resolved/Met Goal: Discharge Planning Outcome: Resolved/Met Goal: Medications Outcome: Resolved/Met Goal: Respiratory Outcome: Resolved/Met Goal: Treatments/Interventions/Procedures Outcome: Resolved/Met Goal: Psychosocial 
Outcome: Resolved/Met Goal: *Optimal pain control at patient's stated goal 
Outcome: Resolved/Met Goal: *Hemodynamically stable Outcome: Resolved/Met Goal: *Demonstrates progressive activity Outcome: Resolved/Met Goal: *Tolerating diet Outcome: Resolved/Met Problem: AMI: Day 4 
 Goal: Off Pathway (Use only if patient is Off Pathway) Outcome: Resolved/Met Goal: Activity/Safety Outcome: Resolved/Met Goal: Diagnostic Test/Procedures Outcome: Resolved/Met Goal: Nutrition/Diet Outcome: Resolved/Met Goal: Discharge Planning Outcome: Resolved/Met Goal: Medications Outcome: Resolved/Met Goal: Respiratory Outcome: Resolved/Met Goal: Treatments/Interventions/Procedures Outcome: Resolved/Met Goal: Psychosocial 
Outcome: Resolved/Met Goal: *Optimal pain control at patient's stated goal 
Outcome: Resolved/Met Goal: *Hemodynamically stable Outcome: Resolved/Met Goal: *Demonstrates progressive activity Outcome: Resolved/Met Goal: *Tolerating diet Outcome: Resolved/Met Problem: AMI: Day 5 Goal: Off Pathway (Use only if patient is Off Pathway) Outcome: Resolved/Met Goal: Activity/Safety Outcome: Resolved/Met Goal: Diagnostic Test/Procedures Outcome: Resolved/Met Goal: Nutrition/Diet Outcome: Resolved/Met Goal: Discharge Planning Outcome: Resolved/Met Goal: Medications Outcome: Resolved/Met Goal: Treatments/Interventions/Procedures Outcome: Resolved/Met Goal: Psychosocial 
Outcome: Resolved/Met Problem: AMI: Discharge Outcomes Goal: *Demonstrates ability to perform prescribed activity without shortness of breath or discomfort Outcome: Resolved/Met Goal: *Verbalizes understanding and describes prescribed diet Outcome: Resolved/Met Goal: *Describes follow-up/return visits to physicians Outcome: Resolved/Met Goal: *Describes home care/support arrangements established based on need Outcome: Resolved/Met Goal: *Anxiety reduced or absent Outcome: Resolved/Met Goal: *Understands and describes signs and symptoms to report to providers(Stroke Metric) Outcome: Resolved/Met Goal: *Verbalizes name, dosage, time, side effects, and number of days to continue medications Outcome: Resolved/Met

## 2019-04-24 NOTE — Clinical Note
Multiple views of the left coronary artery, LAD and circumflex artery obtained using power injection.

## 2019-04-24 NOTE — PROGRESS NOTES
Care Management Interventions PCP Verified by CM: Yes 
Palliative Care Criteria Met (RRAT>21 & CHF Dx)?: No 
Mode of Transport at Discharge: Other (see comment)(wife) Transition of Care Consult (CM Consult): Discharge Planning MyChart Signup: No 
Discharge Durable Medical Equipment: No 
Health Maintenance Reviewed: Yes Physical Therapy Consult: No 
Occupational Therapy Consult: No 
Speech Therapy Consult: No 
Current Support Network: Lives with Spouse Confirm Follow Up Transport: Family Plan discussed with Pt/Family/Caregiver: Yes Discharge Location Discharge Placement: Home Reason for Admission:   STEMI 
                
RRAT Score:          8 Plan for utilizing home health:      [facility] Current Advanced Directive/Advance Care Plan: On File Likelihood of Readmission:  low Transition of Care Plan:                   
CM met with patient at bedside to discuss discharge planning. CM offered [facility] services. Patient agreeable to [address] for [facility]. Patient is independent with ADLs without assistance. Patient's wife will transport patient home after discharge. CM will send referral to [address] for [facility].  
 
Pineda Davis, MPH

## 2019-04-24 NOTE — Clinical Note
Lesion: Located in the Mid LAD. Stent inserted. Single technique used. First inflation pressure = 10 shannon; inflation time: 12 sec.

## 2019-04-24 NOTE — Clinical Note
Lesion: Located in the Distal LAD. Stent inserted. Single technique used. First inflation pressure = 12 shannon; inflation time: 14 sec.

## 2019-04-24 NOTE — Clinical Note
Lesion located in the Proximal Cx. Balloon inflated using single inflation technique. Pressure = 10 shannon; Duration = 16 sec.

## 2019-04-24 NOTE — CARDIO/PULMONARY
Cardiac Rehab: MI education folder, with catheterization brochure, to bedside of Pedro Erickson. Educated using teach back method. Reviewed MI diagnosis definition and purpose of intervention. Discussed risk factors for CAD to include the following: hyperlipidemia, hypertension. Discussed Heart Healthy/Low Sodium (2000 mg) diet. Reviewed the importance of medication compliance, the purpose of Aspirin, and potential side effects. Discussed follow up appointments with cardiologist, signs and symptoms of angina, and what to report to physician after discharge. Emphasized the value of cardiac rehab. Discussed Cardiac Rehab Program format, benefits, and encouraged enrollment to assist with risk modification and management. Initial Cardiac Rehab Program intake appointment scheduled for 5/14/19 at 9 am and appointment information is on the AVS. Pedro Erickson verbalized understanding with questions answered.   Grecia Zuniga RN

## 2019-04-24 NOTE — DISCHARGE INSTRUCTIONS
Cardiology Discharge Summary     Patient ID:  Mervat Mercado  [id number]  36 y.o.  1935    Admit Date: 4/23/2019    Discharge Date: 4/24/2019     Admitting Physician: Brandi Santiago MD     Discharge Physician: Brandi Santiago MD    Admission Diagnoses: STEMI (ST elevation myocardial infarction) ([facility] [I21.3]  STEMI (ST elevation myocardial infarction) ([facility] [I21.3]  Acute ST elevation myocardial infarction (STEMI) involving left anterior descending (LAD) coronary artery ([facility] [I21.02]    Discharge Diagnoses: Active Problems:    STEMI (ST elevation myocardial infarction) ([facility] (4/23/2019)      Acute ST elevation myocardial infarction (STEMI) involving left anterior descending (LAD) coronary artery ([facility] (4/23/2019)    CAD, 2 vs  S/p stents in LAD, infarct vessel  S/p stent in LCX for threatened for closure  EF 50 %  HTN  Hyperlipidemia    Discharge Condition: Stable    Cardiology Procedures this Admission:  Left heart catheterization with PCI    Hospital Course: He presented with STEMI involving LAD which was stented with 2.5x18 and 2.0x22 Humberto stents and LCX which was larger than LAD had a tight 95% lesion with distal KANCHAN 2 flow suggestive of threatened to close as well. It was stented with 3.0x22 Humberto stent with a good result. RCA was fine. EF was 50% next day and his peak troponin was 2.74 suggesting good salvage of his myocardium. He had no complication from MI and groin was fine at discharge. Consults: None    Discharge Exam:     Visit Vitals  /85 (BP 1 Location: Left arm, BP Patient Position: At rest)   Pulse 64   Temp 97.6 °F (36.4 °C)   Resp 24   Ht 6' (1.829 m)   Wt 160 lb (72.6 kg)   SpO2 95%   BMI 21.70 kg/m²     General Appearance:  Well developed, well nourished, in no acute distress. Ears/Nose/Mouth/Throat:   Hearing grossly normal.         Neck: Supple. Chest:   Lungs clear to auscultation bilaterally. Normal resp effort. Cardiovascular:  Regular rate and rhythm, S1, S2 normal, no new murmur. Abdomen:   Soft, non-tender, bowel sounds are present. Extremities: No edema bilaterally. Neuro:  Skin: A/O x 3, grossly nonfocal. Normal mood/affect. Warm and dry. Disposition: home    Patient Instructions:         Referenced discharge instructions provided by nursing for diet and activity. Follow-up with Dr. Era Nguyen in two weeks     Signed:  Vivien Ardon MD  4/24/2019  12:57 PM    Patient Education        Heart Attack: Care Instructions  Your Care Instructions    A heart attack (myocardial infarction, or MI) occurs when one or more of the coronary arteries, which supply the heart with oxygen-rich blood, is blocked. A blockage usually occurs when plaque inside the artery breaks open and a blood clot forms in the artery. After a heart attack, you may be worried about your future. Over the next several weeks, your heart will start to heal. Though it can be hard to break old habits, you can prevent another heart attack by making some lifestyle changes and by taking medicines. You may use this information for ideas about what to do at home to speed your recovery. Follow-up care is a key part of your treatment and safety. Be sure to make and go to all appointments, and call your doctor if you are having problems. It's also a good idea to know your test results and keep a list of the medicines you take. How can you care for yourself at home? Activity    · Until your doctor says it is okay, do not do strenuous exercise. And do not lift, pull, or push anything heavy. Ask your doctor what types of activities are safe for you.     · If your doctor has not set you up with a cardiac rehabilitation (rehab) program, talk to him or her about whether that is right for you. Cardiac rehab includes supervised exercise. It also includes help with diet and lifestyle changes and emotional support. It may reduce your risk of future heart problems.     · Increase your activities slowly.  Take short rest breaks when you get tired.     · If your doctor recommends it, get more exercise. Walking is a good choice. Bit by bit, increase the amount you walk every day. Try for at least 30 minutes on most days of the week. You also may want to swim, bike, or do other activities. Talk with your doctor before you start an exercise program to make sure it is safe for you.     · Ask your doctor when you can drive, go back to work, and do other daily activities again.     · You can have sex as soon as you feel ready for it. Often this means when you can easily walk around or climb stairs. Talk with your doctor if you have any concerns. If you are taking nitroglycerin, do not take erection-enhancing medicine such as sildenafil (Viagra), tadalafil (Cialis), or vardenafil (Levitra) .    Lifestyle changes    · Do not smoke. Smoking increases your risk of another heart attack. If you need help quitting, talk to your doctor about stop-smoking programs and medicines. These can increase your chances of quitting for good.     · Eat a heart-healthy diet that is low in saturated fat and salt, and is full of fruits, vegetables and whole-grains. Eat at least two servings of fish each week. You may get more details about how to eat healthy. But these tips can help you get started.     · Stay at a healthy weight, or lose weight if you need to. Medicines    · Be safe with medicines. Take your medicines exactly as prescribed. Call your doctor if you think you are having a problem with your medicine. You will get more details on the specific medicines your doctor prescribes. Do not stop taking your medicine unless your doctor tells you to. Not taking your medicine might raise your risk of having another heart attack.     · You may need several medicines to help lower your risk of another heart attack. These include:  ?  Blood pressure medicines such as angiotensin-converting enzyme (ACE) inhibitors, ARBs (angiotensin II receptor blockers), and beta-blockers. ? Cholesterol medicine called statins. ? Aspirin and other blood thinners. These prevent blood clots that can cause a heart attack.     · If your doctor has given you nitroglycerin, keep it with you at all times. If you have angina symptoms, such as chest pain or pressure, sit down and rest. Take the first dose of nitroglycerin as directed. If symptoms get worse or are not getting better within 5 minutes, call 911 right away. Stay on the phone. The emergency  will tell you what to do.     · Do not take any over-the-counter medicines, vitamins, or herbal products without talking to your doctor first.    Staying healthy    · Manage other health conditions such as high blood pressure and diabetes.     · Avoid colds and flu. Get a pneumococcal vaccine shot. If you have had one before, ask your doctor whether you need another dose. Get the flu vaccine every year. If you must be around people with colds or flu, wash your hands often.     · Be sure to tell your doctor about any angina symptoms you have had, even if they went away. Pay attention to your angina symptoms. Know what is typical for you and learn how to control it. Know when to call for help.     · Talk to your family, friends, or a counselor about your feelings. It is normal to feel frightened, angry, hopeless, helpless, and even guilty. Talking openly about bad feelings can help you cope. If you have symptoms of depression, talk to your doctor. When should you call for help? Call 911 anytime you think you may need emergency care. For example, call if:    · You have symptoms of a heart attack. These may include:  ? Chest pain or pressure, or a strange feeling in the chest.  ? Sweating. ? Shortness of breath. ? Nausea or vomiting. ? Pain, pressure, or a strange feeling in the back, neck, jaw, or upper belly or in one or both shoulders or arms. ? Lightheadedness or sudden weakness. ? A fast or irregular heartbeat.   After you call 911, the  may tell you to chew 1 adult-strength or 2 to 4 low-dose aspirin. Wait for an ambulance. Do not try to drive yourself.     · You have angina symptoms (such as chest pain or pressure) that do not go away with rest or are not getting better within 5 minutes after you take a dose of nitroglycerin.     · You passed out (lost consciousness).     · You feel like you are having another heart attack.    Call your doctor now or seek immediate medical care if:    · You are having angina symptoms, such as chest pain or pressure, more often than usual, or the symptoms are different or worse than usual.     · You have new or increased shortness of breath.     · You are dizzy or lightheaded, or you feel like you may faint.    Watch closely for changes in your health, and be sure to contact your doctor if you have any problems. Where can you learn more? Go to http://nara-caroline.info/. Enter [id number] in the search box to learn more about \"Heart Attack: Care Instructions. \"  Current as of: July 22, 2018  Content Version: 11.9  © 3481-9460 [employer]. Care instructions adapted under license by [employer] (which disclaims liability or warranty for this information). If you have questions about a medical condition or this instruction, always ask your healthcare professional. [place] [address] any warranty or liability for your use of this information. Patient Education        Learning About Percutaneous Coronary Intervention  What is percutaneous coronary intervention? Percutaneous coronary intervention (PCI) is the name for procedures to open a blocked coronary artery. The two most common are coronary angioplasty and coronary stent placement. A PCI is a way to open a blocked coronary artery before, during, or after a heart attack. It gets blood flowing to your heart.  And it can help prevent heart problems by widening an artery that has been narrowed by fatty buildup (plaque). This also may be called balloon angioplasty. Before a PCI, a doctor does a test to find blocked arteries. The test is called cardiac catheterization. The doctor puts a tiny tube called a catheter into an artery in your groin or arm. The doctor moves the catheter through the artery to your heart. Then he or she puts a dye into the catheter. This makes your heart's arteries show up on a screen so the doctor can see any blockages. The test also can measure the pressure inside your heart's chambers. If you have a blocked artery, the doctor may do an angioplasty or a coronary stent procedure. In an angioplasty, the doctor uses a catheter with a tiny balloon at the tip. He or she puts it into the blocked area and inflates it. The balloon presses the plaque against the walls of the artery. This makes more room for blood to flow. In most cases, the doctor then puts a stent in the artery. A stent is a small, expandable tube that presses against the walls of the artery. The stent is left in the artery to keep the artery open. This helps blood flow. It also may keep small pieces of plaque from breaking off and causing a heart attack. How is PCI done? PCI is done in a cardiac catheterization laboratory (\"cath lab\"). It is done by a heart specialist called a cardiologist. The whole procedure may take 1½ to 3 hours. You lie on a table under a large X-ray machine. You will get medicine through an IV in one of your veins. It helps you relax and not feel pain. You will be awake during the procedure. But you may not be able to remember much about it. The doctor will inject some medicine into your arm or groin to numb the skin. You will feel a small needle stick. It's like having a blood test. You may feel some pressure when the doctor puts in the catheter. But you will not feel pain. The doctor will look at X-ray pictures on a monitor (like a TV set) to move the catheter to your heart.  You may feel warm or flushed for a short time when the doctor injects dye into your artery. The doctor then will inflate a tiny balloon at the end of the catheter. The balloon is inflated for a brief time. Then it is deflated and removed. The doctor also may use the catheter to put a stent in the artery. What can you expect after PCI? The catheter will be removed. A nurse or doctor may press on a bandage on the opening. Then a bandage or a compression device may be placed on your groin or wrist at the catheter insertion site. This prevents bleeding. After the test, you will be taken to a room where the catheter site and your heart rate, blood pressure, and temperature will be checked several times. If the catheter was put in your groin, you will need to lie still and keep your leg straight for several hours. If the catheter was put in your arm, you may be able to sit up and get out of bed right away. But you will need to keep your arm still for at least one hour. The average hospital stay is 1 to 2 days for most procedures. When you go home, you will get instructions from your doctor to help you recover well and prevent problems. Make sure to drink plenty of fluids (unless your doctor tells you not to) for several hours after the test. This will help flush the dye out of your body. Your doctor will prescribe blood-thinning medicines. You will likely take aspirin plus another blood thinner. It is very important that you take these medicines exactly as directed. They help keep the coronary artery open and reduce your risk of a heart attack. If you have this procedure, you will still need to make lifestyle changes like eating healthy, being active, and not smoking. This will give you the best chance for a longer, healthier life. Follow-up care is a key part of your treatment and safety. Be sure to make and go to all appointments, and call your doctor if you are having problems.  It's also a good idea to know your test results and keep a list of the medicines you take. Where can you learn more? Go to http://nara-caroline.info/. Enter C563 in the search box to learn more about \"Learning About Percutaneous Coronary Intervention. \"  Current as of: July 22, 2018  Content Version: 11.9  © 1338-7549 [employer]. Care instructions adapted under license by [employer] (which disclaims liability or warranty for this information). If you have questions about a medical condition or this instruction, always ask your healthcare professional. [address] any warranty or liability for your use of this information. Patient Education        Percutaneous Coronary Intervention: What to Expect at Home  Your Recovery    Percutaneous coronary intervention (PCI) is the name for procedures that are used to open a narrowed or blocked coronary artery. The two most common PCI procedures are coronary angioplasty and coronary stent placement. Your groin or arm may have a bruise and feel sore for a day or two after a percutaneous coronary intervention (PCI). You can do light activities around the house, but nothing strenuous for several days. This care sheet gives you a general idea about how long it will take for you to recover. But each person recovers at a different pace. Follow the steps below to get better as quickly as possible. How can you care for yourself at home? Activity    · If the doctor gave you a sedative:  ? For 24 hours, don't do anything that requires attention to detail. It takes time for the medicine's effects to completely wear off.  ? For your safety, do not drive or operate any machinery that could be dangerous. Wait until the medicine wears off and you can think clearly and react easily.     · Do not do strenuous exercise and do not lift, pull, or push anything heavy until your doctor says it is okay. This may be for a day or two.  You can walk around the house and do light activity, such as cooking.     · If the catheter was placed in your groin, try not to walk up stairs for the first couple of days.     · If the catheter was placed in your arm near your wrist, do not bend your wrist deeply for the first couple of days. Be careful using your hand to get into and out of a chair or bed.     · Carry your stent identification card with you at all times.     · If your doctor recommends it, get more exercise. Walking is a good choice. Bit by bit, increase the amount you walk every day. Try for at least 30 minutes on most days of the week. Diet    · Drink plenty of fluids to help your body flush out the dye. If you have kidney, heart, or liver disease and have to limit fluids, talk with your doctor before you increase the amount of fluids you drink.     · Keep eating a heart-healthy diet that has lots of fruits, vegetables, and whole grains. If you have not been eating this way, talk to your doctor. You also may want to talk to a dietitian. This expert can help you to learn about healthy foods and plan meals. Medicines    · Your doctor will tell you if and when you can restart your medicines. He or she will also give you instructions about taking any new medicines.     · If you take blood thinners, such as warfarin (Coumadin), clopidogrel (Plavix), or aspirin, be sure to talk to your doctor. He or she will tell you if and when to start taking those medicines again. Make sure that you understand exactly what your doctor wants you to do.     · Your doctor will prescribe blood-thinning medicines. You will likely take aspirin plus another antiplatelet, such as clopidogrel (Plavix). It is very important that you take these medicines exactly as directed.  These medicines help keep the coronary artery open and reduce your risk of a heart attack.     · Call your doctor if you think you are having a problem with your medicine.    Care of the catheter site    · For 1 or 2 days, keep a bandage over the spot where the catheter was inserted. The bandage probably will fall off in this time.     · Put ice or a cold pack on the area for 10 to 20 minutes at a time to help with soreness or swelling. Put a thin cloth between the ice and your skin.     · You may shower 24 to 48 hours after the procedure, if your doctor okays it. Pat the incision dry.     · Do not soak the catheter site until it is healed. Don't take a bath for 1 week, or until your doctor tells you it is okay.     · If you are bleeding, lie down and press on the area for 15 minutes to try to make it stop. If the bleeding does not stop, call your doctor or seek immediate medical care. Follow-up care is a key part of your treatment and safety. Be sure to make and go to all appointments, and call your doctor if you are having problems. It's also a good idea to know your test results and keep a list of the medicines you take. When should you call for help? Call 911 anytime you think you may need emergency care. For example, call if:    · You passed out (lost consciousness).     · You have severe trouble breathing.     · You have sudden chest pain and shortness of breath, or you cough up blood.     · You have symptoms of a heart attack, such as:  ? Chest pain or pressure. ? Sweating. ? Shortness of breath. ? Nausea or vomiting. ? Pain that spreads from the chest to the neck, jaw, or one or both shoulders or arms. ? Dizziness or lightheadedness. ? A fast or uneven pulse. After calling 911, chew 1 adult-strength aspirin. Wait for an ambulance.  Do not try to drive yourself.     · You have been diagnosed with angina, and you have angina symptoms that do not go away with rest or are not getting better within 5 minutes after you take one dose of nitroglycerin.    Call your doctor now or seek immediate medical care if:    · You are bleeding from the area where the catheter was put in your artery.     · You have a fast-growing, painful lump at the catheter site.     · You have signs of infection, such as:  ? Increased pain, swelling, warmth, or redness. ? Red streaks leading from the catheter site. ? Pus draining from the catheter site. ? A fever.     · Your leg or arm looks blue or feels cold, numb, or tingly.    Watch closely for changes in your health, and be sure to contact your doctor if you have any problems. Where can you learn more? Go to http://nara-caroline.info/. Enter H288 in the search box to learn more about \"Percutaneous Coronary Intervention: What to Expect at Home. \"  Current as of: July 22, 2018  Content Version: 11.9  © 2507-8845 [employer]. Care instructions adapted under license by [employer] (which disclaims liability or warranty for this information). If you have questions about a medical condition or this instruction, always ask your healthcare professional. [address] any warranty or liability for your use of this information.

## 2019-04-24 NOTE — Clinical Note
History and physical documented and up to date, allergies reviewed, lab results reviewed, pre-procedure education provided and patient verbalized understanding of procedure.

## 2019-04-25 ENCOUNTER — PATIENT OUTREACH (OUTPATIENT)
Dept: INTERNAL MEDICINE CLINIC | Age: 84
End: 2019-04-25

## 2019-04-25 NOTE — PROGRESS NOTES
Hospital Discharge Follow-Up Date/Time:  2019 11:25 AM 
 
Patient was admitted to [facility] [address] on 19 and discharged on 19 for STEMI. The physician discharge summary was not available at the time of outreach. Patient was contacted within 2 business days of discharge. Top Challenges reviewed with the provider - Would like to revisit his DDNR status with PCP- has questions in relation to recent hospitalization - Pt would like to review his medications with PCP to assure that he should be on all of them; PharmD referral placed as well. - He had been taking a product for sleep called Torey Full Spectrum Extract which is a CBD derivative. Recommended he not restart that until discussing further with PCP.  
 
- Consider recheck of BMP Lab Results Component Value Date/Time Sodium 136 2019 04:19 PM  
 Potassium 3.2 (L) 2019 04:19 PM  
 Chloride 108 2019 04:19 PM  
 CO2 24 2019 04:19 PM  
 Anion gap 4 (L) 2019 04:19 PM  
 Glucose 131 (H) 2019 04:19 PM  
 BUN 14 2019 04:19 PM  
 Creatinine 0.74 2019 04:19 PM  
 BUN/Creatinine ratio 19 2019 04:19 PM  
 GFR est AA >60 2019 04:19 PM  
 GFR est non-AA >60 2019 04:19 PM  
 Calcium 7.7 (L) 2019 04:19 PM  
 
Results for Chhaya Malone (MRN [id number]) as of 2019 15:06 
 2019 16:19 2019 05:31 Troponin-I, Qt. 2.78 (H) 80.20 (H) Method of communication with provider :chart routing, phone and face to face Inpatient RRAT score: 8 Was this a readmission? no  
 
Nurse Navigator (NN) contacted the patient by telephone to perform post hospital discharge assessment. Verified name and  with patient as identifiers. Provided introduction to self, and explanation of the Nurse Navigator role. Reviewed discharge instructions and red flags with patient who verbalized understanding.  Patient given an opportunity to ask questions and does not have any further questions or concerns at this time. The patient agrees to contact the PCP office for questions related to their healthcare. NN provided contact information for future reference. Disease Specific:   N/A Summary of patient's top problems: 
1. STEMI- arrived to [facility] ED via EMS with STEMI team on standby. Immediately taken to cath lab by Dr. Al Murillo and cath team. Stents placed to LAD and LCx. EF 50%. Pt discharged to home on 4/24/19 with new scripts for Metoprolol, Brilinta, ASA, Atorvastatin and instructions to f/u with Dr. Al Murillo in 2 weeks. Home Health orders at discharge: [facility] Home Health company: [facility] Date of initial visit: 4/26/19 Durable Medical Equipment ordered/company: none Barriers to care? lack of knowledge about disease, medication management Advance Care Planning:  
Does patient have an Advance Directive: Has AMD on file and agents remain the same. He and his wife plan to revisit the document since it was created in 2012 to make sure it still reflects his wishes. Offered HCV facilitator session, but he states his son is a  and he will update it with him. Medication(s):  
New Medications at Discharge: ASA 81mg, Brilinta, Atorvastatin, Toprol XL Changed Medications at Discharge: none Discontinued Medications at Discharge: none Medication reconciliation was performed with patient, who verbalizes understanding of administration of home medications. There were no barriers to obtaining medications identified at this time. Referral to Pharm D needed: yes, placed with ARNAV StewartD Current Outpatient Medications Medication Sig  
 aspirin 81 mg chewable tablet Take 1 Tab by mouth daily.  atorvastatin (LIPITOR) 10 mg tablet Take 1 Tab by mouth nightly.  metoprolol succinate (TOPROL-XL) 25 mg XL tablet Take 1 Tab by mouth daily.  ticagrelor (BRILINTA) 90 mg tablet Take 1 Tab by mouth two (2) times a day.  felodipine (PLENDIL SR) 10 mg 24 hr tablet Take 1 tablet by mouth  daily  silodosin (RAPAFLO PO) Take  by mouth daily.  finasteride (PROSCAR) 5 mg tablet Take 5 mg by mouth daily.  multivitamins-minerals-lutein (CENTRUM SILVER) Tab Take 1 Tab by mouth two (2) times a week.  MELATONIN PO Take 3 mg by mouth nightly as needed. No current facility-administered medications for this visit. Medications Discontinued During This Encounter Medication Reason  silodosin (RAPAFLO) 8 mg capsule Duplicate Order  finasteride (PROSCAR) 5 mg tablet Duplicate Order  aspirin 81 mg tablet Duplicate Order  felodipine (PLENDIL SR) 10 mg 24 hr tablet Duplicate Order [facility] follow up appointment(s):  
Future Appointments Date Time Provider Valentino Henriquezi 4/29/2019  1:00 PM Solo Llamas MD [id number] [facility]  
5/14/2019  9:00 AM Rachel Isidro RN [place] [id number]  
8/21/2019  9:30 AM Solo Llamas MD [id number] [facility] Non[facility] follow up appointment(s): none made yet [facility]:  information provided as a resource Goals Myocardial Infarction  Knowledge and adherence of medication (ie. antiplatelet, B blocker, ACE, lipid lowering agent, action, side effects, missed dose, etc.)   
   
4/26/2019 S/p STEMI 4/23/19 Was given scripts for the following new medications: 
Atorvastatin 10 mg qhs 
Metoprolol Succinate 25mg XL every day Brilinta 90 mg BID 
ASA 81 mg every day PharmD referral placed to review pt's medications at his request and to review if he can take CBD oil supplement for sleep with current medication regime. Sanjiv Ortiz RN, [facility] Ambulatory Navigator, [facility] Internal Medicine  Patient verbalizes understanding of self -management goals after a Myocardial Infaraction. 4/26/2019 S/p STEMI 4/23/19 Right Groin Cath site care:  Put ice or a cold pack on the area for 10 to 20 minutes at a time to help with soreness or 
swelling. Put a thin cloth between the ice and your skin.  Pat incision dry after showering- no bathing/swimming for at least 1 week or until MD tells you it's ok.  If you are bleeding, lie down and press on the area for 15 minutes to try to make it stop. If the 
bleeding does not stop, call your doctor or seek immediate medical care. Pt will call 911 for any symptoms of heart attack, such as: 
? Chest pain or pressure, or a strange feeling in the chest. 
? Sweating. ? Shortness of breath. ? Nausea or vomiting. ? Pain, pressure, or a strange feeling in the back, neck, jaw, or upper belly or in one or 
both shoulders or arms. ? Lightheadedness or sudden weakness. ? A fast or irregular heartbeat. Madeline Reyna RN, [facility] Ambulatory Navigator, [facility] Internal Medicine

## 2019-04-26 ENCOUNTER — HOME CARE VISIT (OUTPATIENT)
Dept: SCHEDULING | Facility: HOME HEALTH | Age: 84
End: 2019-04-26

## 2019-04-26 PROCEDURE — G0299 HHS/HOSPICE OF RN EA 15 MIN: HCPCS

## 2019-04-29 ENCOUNTER — OFFICE VISIT (OUTPATIENT)
Dept: INTERNAL MEDICINE CLINIC | Age: 84
End: 2019-04-29

## 2019-04-29 ENCOUNTER — TELEPHONE (OUTPATIENT)
Dept: INTERNAL MEDICINE CLINIC | Age: 84
End: 2019-04-29

## 2019-04-29 VITALS
TEMPERATURE: 97.7 F | WEIGHT: 168 LBS | HEIGHT: 72 IN | BODY MASS INDEX: 22.75 KG/M2 | HEART RATE: 66 BPM | SYSTOLIC BLOOD PRESSURE: 115 MMHG | RESPIRATION RATE: 16 BRPM | OXYGEN SATURATION: 97 % | DIASTOLIC BLOOD PRESSURE: 66 MMHG

## 2019-04-29 DIAGNOSIS — R09.89 OTHER SPECIFIED SYMPTOMS AND SIGNS INVOLVING THE CIRCULATORY AND RESPIRATORY SYSTEMS: ICD-10-CM

## 2019-04-29 DIAGNOSIS — E87.6 HYPOKALEMIA: ICD-10-CM

## 2019-04-29 DIAGNOSIS — I10 ESSENTIAL HYPERTENSION: ICD-10-CM

## 2019-04-29 DIAGNOSIS — Z71.89 ADVANCED CARE PLANNING/COUNSELING DISCUSSION: ICD-10-CM

## 2019-04-29 DIAGNOSIS — F51.01 PRIMARY INSOMNIA: ICD-10-CM

## 2019-04-29 DIAGNOSIS — I25.2 HISTORY OF ST ELEVATION MYOCARDIAL INFARCTION (STEMI): Primary | ICD-10-CM

## 2019-04-29 PROBLEM — I21.3 STEMI (ST ELEVATION MYOCARDIAL INFARCTION) (HCC): Status: RESOLVED | Noted: 2019-04-23 | Resolved: 2019-04-29

## 2019-04-29 PROBLEM — I21.02 ACUTE ST ELEVATION MYOCARDIAL INFARCTION (STEMI) INVOLVING LEFT ANTERIOR DESCENDING (LAD) CORONARY ARTERY (HCC): Status: RESOLVED | Noted: 2019-04-23 | Resolved: 2019-04-29

## 2019-04-29 RX ORDER — FELODIPINE 10 MG/1
TABLET, EXTENDED RELEASE ORAL
Qty: 90 TAB | Refills: 1 | Status: SHIPPED | COMMUNITY
Start: 2019-04-29 | End: 2019-08-21 | Stop reason: SDUPTHER

## 2019-04-29 NOTE — PROGRESS NOTES
Alam Haider is a 81 y.o. male who was seen in clinic today (4/29/2019). Assessment & Plan:     Diagnoses and all orders for this visit:    1. History of ST elevation myocardial infarction (STEMI)- new dx last visit, reviewed cath, will continue w/ DAPT, BB, and statin. Will continue w/ moderate intensity statin   -     BASIC METABOLIC PANEL (7)  -     DUPLEX LOWER EXT ARTERY RIGHT; Future    2. Other specified symptoms and signs involving the circulatory and respiratory systems - new dx, likely hematoma but since pulse is more prominent will get US to r/u pseudo-aneurysm  -     DUPLEX LOWER EXT ARTERY RIGHT; Future    3. Hypokalemia- new dx, normally runs high to high normal, will repeat  -     BASIC METABOLIC PANEL (7)    4. Essential hypertension- improved, will continue with both BB and CCB, if any issues will stop CCB. -     felodipine (PLENDIL SR) 10 mg 24 hr tablet; Take 1 tablet by mouth  daily    6. Advanced care planning/counseling discussion- reviewed FC vs DNR, he reports questioned his code status due to a nurse questioning him prior to the catheterization about wanting intervention if anything goes wrong. See ACP note. Pt wants to remain DNR. Follow-up and Dispositions    · Return if symptoms worsen or fail to improve. Subjective:   Chani Pinto was seen today for Hospital Follow Up    Transition Care Management:    Admission: 4/23  Discharge: 4/24  Location: [address] [place] [address] Sylvia [facility]'s  Diagnosis: chest pain - STEMI  Nurse Navigator note reviewed: Yes    We reviewed the records. Discharge summary is still not available. 2nd chart created during admission, these have been merged. He presented with serena horse like chest pain in the morning. He attributed to muscular pain from overdoing it in the yard. It was not improving and worsening. He presented to the ER and EKG showed a STEMI.   He underwent cardiac cath w/ placement of 3 stents (stent to LAD x 2, stent to Ost Cx to Prox Cx x1). He had been on aspirin in the past but this was stopped a few months ago. This was restarted along with Brilinta, moderate intensity statin (atorvastatin 10mg), and metoprolol (25mg). He is taking his medications as directed & only side effect is some bruising. These symptoms show no change. Brief Labs:     Lab Results   Component Value Date/Time    Sodium 136 04/23/2019 04:19 PM    Potassium 3.2 04/23/2019 04:19 PM    Creatinine 0.74 04/23/2019 04:19 PM          Prior to Admission medications    Medication Sig Start Date End Date Taking? Authorizing Provider   aspirin 81 mg chewable tablet Take 1 Tab by mouth daily. 4/25/19  Yes Soniya Haley MD   atorvastatin (LIPITOR) 10 mg tablet Take 1 Tab by mouth nightly. 4/24/19  Yes Soniya Haley MD   metoprolol succinate (TOPROL-XL) 25 mg XL tablet Take 1 Tab by mouth daily. 4/25/19  Yes Soniya Haley MD   ticagrelor (BRILINTA) 90 mg tablet Take 1 Tab by mouth two (2) times a day. 4/24/19  Yes Soniya Haley MD   MELATONIN PO Take 3 mg by mouth nightly as needed. Yes Provider, Historical   felodipine (PLENDIL SR) 10 mg 24 hr tablet Take 1 tablet by mouth  daily 2/18/19  Yes Susana Hodgkin, MD   silodosin (RAPAFLO PO) Take  by mouth daily. Yes Provider, Historical   finasteride (PROSCAR) 5 mg tablet Take 5 mg by mouth daily. 8/15/15  Yes Provider, Historical   multivitamins-minerals-lutein (CENTRUM SILVER) Tab Take 1 Tab by mouth two (2) times a week. Yes Provider, Historical          No Known Allergies        Review of Systems   Constitutional: Negative for malaise/fatigue and weight loss. HENT: Negative for nosebleeds. Respiratory: Negative for cough, hemoptysis and shortness of breath. Cardiovascular: Negative for chest pain, palpitations and leg swelling. Gastrointestinal: Negative for abdominal pain, blood in stool, constipation, diarrhea, heartburn, melena, nausea and vomiting. Genitourinary: Negative for frequency and hematuria. Musculoskeletal: Negative for joint pain and myalgias. Neurological: Negative for dizziness and headaches. Endo/Heme/Allergies: Bruises/bleeds easily. Psychiatric/Behavioral: Negative for depression. The patient is not nervous/anxious. Objective:   Physical Exam   Constitutional: No distress. Eyes: Conjunctivae are normal. No scleral icterus. Cardiovascular: Regular rhythm and normal heart sounds. No murmur heard. Pulses:       Femoral pulses are 3+ on the right side, and 2+ on the left side. Swelling around R femoral pulse, no bruit   Pulmonary/Chest: Effort normal and breath sounds normal. He has no wheezes. He has no rales. Abdominal: Soft. Bowel sounds are normal. He exhibits no mass. There is no hepatosplenomegaly. There is no tenderness. Skin:   Excessive bruising in the R groin and on bilateral hands         Visit Vitals  /66   Pulse 66   Temp 97.7 °F (36.5 °C) (Oral)   Resp 16   Ht 6' (1.829 m)   Wt 168 lb (76.2 kg)   SpO2 97%   BMI 22.78 kg/m²         Disclaimer:  Advised him to call back or return to office if symptoms worsen/change/persist.  Discussed expected course/resolution/complications of diagnosis in detail with patient. Medication risks/benefits/costs/interactions/alternatives discussed with patient. He was given an after visit summary which includes diagnoses, current medications, & vitals. He expressed understanding with the diagnosis and plan. Aspects of this note may have been generated using voice recognition software. Despite editing, there may be some syntax errors.        Martinez Ibarra MD

## 2019-04-29 NOTE — TELEPHONE ENCOUNTER
Appt scheduled for Duplex of lower right extremity, scheduled for Thursday at 4pm, arrive at 3:30 at [facility] [address] patient notified

## 2019-04-29 NOTE — PATIENT INSTRUCTIONS
Heart Attack: Care Instructions  Your Care Instructions    A heart attack (myocardial infarction, or MI) occurs when one or more of the coronary arteries, which supply the heart with oxygen-rich blood, is blocked. A blockage usually occurs when plaque inside the artery breaks open and a blood clot forms in the artery. After a heart attack, you may be worried about your future. Over the next several weeks, your heart will start to heal. Though it can be hard to break old habits, you can prevent another heart attack by making some lifestyle changes and by taking medicines. You may use this information for ideas about what to do at home to speed your recovery. Follow-up care is a key part of your treatment and safety. Be sure to make and go to all appointments, and call your doctor if you are having problems. It's also a good idea to know your test results and keep a list of the medicines you take. How can you care for yourself at home? Activity    · Until your doctor says it is okay, do not do strenuous exercise. And do not lift, pull, or push anything heavy. Ask your doctor what types of activities are safe for you.     · If your doctor has not set you up with a cardiac rehabilitation (rehab) program, talk to him or her about whether that is right for you. Cardiac rehab includes supervised exercise. It also includes help with diet and lifestyle changes and emotional support. It may reduce your risk of future heart problems.     · Increase your activities slowly. Take short rest breaks when you get tired.     · If your doctor recommends it, get more exercise. Walking is a good choice. Bit by bit, increase the amount you walk every day. Try for at least 30 minutes on most days of the week. You also may want to swim, bike, or do other activities.  Talk with your doctor before you start an exercise program to make sure it is safe for you.     · Ask your doctor when you can drive, go back to work, and do other daily activities again.     · You can have sex as soon as you feel ready for it. Often this means when you can easily walk around or climb stairs. Talk with your doctor if you have any concerns. If you are taking nitroglycerin, do not take erection-enhancing medicine such as sildenafil (Viagra), tadalafil (Cialis), or vardenafil (Levitra) .    Lifestyle changes    · Do not smoke. Smoking increases your risk of another heart attack. If you need help quitting, talk to your doctor about stop-smoking programs and medicines. These can increase your chances of quitting for good.     · Eat a heart-healthy diet that is low in saturated fat and salt, and is full of fruits, vegetables and whole-grains. Eat at least two servings of fish each week. You may get more details about how to eat healthy. But these tips can help you get started.     · Stay at a healthy weight, or lose weight if you need to. Medicines    · Be safe with medicines. Take your medicines exactly as prescribed. Call your doctor if you think you are having a problem with your medicine. You will get more details on the specific medicines your doctor prescribes. Do not stop taking your medicine unless your doctor tells you to. Not taking your medicine might raise your risk of having another heart attack.     · You may need several medicines to help lower your risk of another heart attack. These include:  ? Blood pressure medicines such as angiotensin-converting enzyme (ACE) inhibitors, ARBs (angiotensin II receptor blockers), and beta-blockers. ? Cholesterol medicine called statins. ? Aspirin and other blood thinners. These prevent blood clots that can cause a heart attack.     · If your doctor has given you nitroglycerin, keep it with you at all times. If you have angina symptoms, such as chest pain or pressure, sit down and rest. Take the first dose of nitroglycerin as directed.  If symptoms get worse or are not getting better within 5 minutes, call 911 right away. Stay on the phone. The emergency  will tell you what to do.     · Do not take any over-the-counter medicines, vitamins, or herbal products without talking to your doctor first.    Staying healthy    · Manage other health conditions such as high blood pressure and diabetes.     · Avoid colds and flu. Get a pneumococcal vaccine shot. If you have had one before, ask your doctor whether you need another dose. Get the flu vaccine every year. If you must be around people with colds or flu, wash your hands often.     · Be sure to tell your doctor about any angina symptoms you have had, even if they went away. Pay attention to your angina symptoms. Know what is typical for you and learn how to control it. Know when to call for help.     · Talk to your family, friends, or a counselor about your feelings. It is normal to feel frightened, angry, hopeless, helpless, and even guilty. Talking openly about bad feelings can help you cope. If you have symptoms of depression, talk to your doctor. When should you call for help? Call 911 anytime you think you may need emergency care. For example, call if:    · You have symptoms of a heart attack. These may include:  ? Chest pain or pressure, or a strange feeling in the chest.  ? Sweating. ? Shortness of breath. ? Nausea or vomiting. ? Pain, pressure, or a strange feeling in the back, neck, jaw, or upper belly or in one or both shoulders or arms. ? Lightheadedness or sudden weakness. ? A fast or irregular heartbeat. After you call 911, the  may tell you to chew 1 adult-strength or 2 to 4 low-dose aspirin. Wait for an ambulance.  Do not try to drive yourself.     · You have angina symptoms (such as chest pain or pressure) that do not go away with rest or are not getting better within 5 minutes after you take a dose of nitroglycerin.     · You passed out (lost consciousness).     · You feel like you are having another heart attack.    Call your doctor now or seek immediate medical care if:    · You are having angina symptoms, such as chest pain or pressure, more often than usual, or the symptoms are different or worse than usual.     · You have new or increased shortness of breath.     · You are dizzy or lightheaded, or you feel like you may faint.    Watch closely for changes in your health, and be sure to contact your doctor if you have any problems. Where can you learn more? Go to http://nara-caroline.info/. Enter [id number] in the search box to learn more about \"Heart Attack: Care Instructions. \"  Current as of: July 22, 2018  Content Version: 11.9  © 4284-8283 [employer]. Care instructions adapted under license by [employer] (which disclaims liability or warranty for this information). If you have questions about a medical condition or this instruction, always ask your healthcare professional. [address] any warranty or liability for your use of this information.

## 2019-04-30 ENCOUNTER — PATIENT OUTREACH (OUTPATIENT)
Dept: INTERNAL MEDICINE CLINIC | Age: 84
End: 2019-04-30

## 2019-04-30 LAB
BUN SERPL-MCNC: 14 MG/DL (ref 8–27)
BUN/CREAT SERPL: 19 (ref 10–24)
CHLORIDE SERPL-SCNC: 104 MMOL/L (ref 96–106)
CO2 SERPL-SCNC: 24 MMOL/L (ref 20–29)
CREAT SERPL-MCNC: 0.74 MG/DL (ref 0.76–1.27)
GLUCOSE SERPL-MCNC: 115 MG/DL (ref 65–99)
POTASSIUM SERPL-SCNC: 4.3 MMOL/L (ref 3.5–5.2)
SODIUM SERPL-SCNC: 142 MMOL/L (ref 134–144)

## 2019-04-30 NOTE — PROGRESS NOTES
Goals Myocardial Infarction  Knowledge and adherence of medication (ie. antiplatelet, B blocker, ACE, lipid lowering agent, action, side effects, missed dose, etc.)   
   
4/30/2019 Pt presented to [facility] with Dr. Greg Santiago on 4/29/19 as directed. Is taking medications as directed. Has a swollen area right groin at insertion site for cath. Dr. Greg Santiago arranged for US to check for pseudoaneurysm. Pt denies pain or increasing swelling, discoloration, numbness in LE. Alanna Kraus RN, [facility] Ambulatory Navigator, [facility] Internal Medicine 4/26/2019 S/p STEMI 4/23/19 Was given scripts for the following new medications: 
Atorvastatin 10 mg qhs 
Metoprolol Succinate 25mg XL every day Brilinta 90 mg BID 
ASA 81 mg every day PharmD referral placed to review pt's medications at his request and to review if he can take CBD oil supplement for sleep with current medication regime. Alanna Kraus RN, [facility] Ambulatory Navigator, [facility] Internal Medicine  Patient verbalizes understanding of self -management goals after a Myocardial Infaraction. 4/30/2019 R/o pseudoaneurysm right groin d/t swelling at cath site. Pt denies pain, numbness/tingling in extremity. Scheduled for US on 5/2/19. Agrees to contact Cardiologist or PCP or go to ER if any worsening of swelling, pain, numbness, cold extremity occurs. Alanna Kraus RN, [facility] Ambulatory Navigator, [facility] Internal Medicine 4/26/2019 S/p STEMI 4/23/19 Right Groin Cath site care:  Put ice or a cold pack on the area for 10 to 20 minutes at a time to help with soreness or 
swelling. Put a thin cloth between the ice and your skin.  Pat incision dry after showering- no bathing/swimming for at least 1 week or until MD tells you it's ok.  If you are bleeding, lie down and press on the area for 15 minutes to try to make it stop. If the 
bleeding does not stop, call your doctor or seek immediate medical care. Pt will call 911 for any symptoms of heart attack, such as: 
? Chest pain or pressure, or a strange feeling in the chest. 
? Sweating. ? Shortness of breath. ? Nausea or vomiting. ? Pain, pressure, or a strange feeling in the back, neck, jaw, or upper belly or in one or 
both shoulders or arms. ? Lightheadedness or sudden weakness. ? A fast or irregular heartbeat. Arnie Torres RN, [facility] Ambulatory Navigator, [facility] Internal Medicine

## 2019-04-30 NOTE — ACP (ADVANCE CARE PLANNING)
Advance Care Planning (ACP) Provider Note - Comprehensive     Date of ACP Conversation: 04/29/19  Persons included in Conversation:  patient  Length of ACP Conversation in minutes: <16 minutes (Non-Billable)    Authorized Decision Maker (if patient is incapable of making informed decisions):   Healthcare Agent/Medical Power of  under Advance Directive      He has an advanced directive - a copy has been provided & still reflects him wishes. Reviewed DNR/DNI and patient is interested in remaining a DNR, no changes made. General ACP for ALL Patients with Decision Making Capacity:  Importance of advance care planning, including choosing a healthcare agent to communicate patient's healthcare decisions if patient lost the ability to make decisions, such as after a sudden illness or accident  Understanding of the healthcare agent role was assessed and information provided  Opportunity offered to explore how cultural, Episcopalian, spiritual, or personal beliefs would affect decisions for future care  Exploration of values, goals, and preferences if recovery is not expected, even with continued medical treatment in the event of: Imminent death or severe, permanent brain injury    For Serious or Chronic Illness:  Understanding of CPR, goals and expected outcomes, benefits and burdens discussed.   Understanding of medical condition  Information on CPR success rates provided (e.g. for CPR in hospital, survival to d/c at two weeks is 22%, for chronically ill or elderly/frail survival is less than 3%)    Interventions Provided:  Recommended communicating the plan and making copies for the healthcare agent, personal physician, and others as appropriate (e.g., health system)  Recommended review of completed ACP document annually or upon change in health status

## 2019-05-02 ENCOUNTER — HOSPITAL ENCOUNTER (OUTPATIENT)
Dept: VASCULAR SURGERY | Age: 84
Discharge: HOME | End: 2019-05-02
Attending: INTERNAL MEDICINE
Payer: MEDICARE

## 2019-05-02 DIAGNOSIS — I25.2 HISTORY OF ST ELEVATION MYOCARDIAL INFARCTION (STEMI): ICD-10-CM

## 2019-05-02 DIAGNOSIS — R09.89 OTHER SPECIFIED SYMPTOMS AND SIGNS INVOLVING THE CIRCULATORY AND RESPIRATORY SYSTEMS: ICD-10-CM

## 2019-05-02 PROCEDURE — 93926 LOWER EXTREMITY STUDY: CPT

## 2019-05-03 NOTE — PROGRESS NOTES
Results reviewed. Please call patient and notify him that his ultrasound is normal.  Nothing concerning at cath insertion site. Follow up with cardiologist as directed.

## 2019-05-08 ENCOUNTER — PATIENT OUTREACH (OUTPATIENT)
Dept: INTERNAL MEDICINE CLINIC | Age: 84
End: 2019-05-08

## 2019-05-08 NOTE — PROGRESS NOTES
Goals Myocardial Infarction  Knowledge and adherence of medication (ie. antiplatelet, B blocker, ACE, lipid lowering agent, action, side effects, missed dose, etc.)   
   
5/8/2019 Pt reports he is doing well. Groin is healed and he is back to walking 15 min/day. Is taking all medications as directed and will be seeing Dr. Savannah Manjarrez on 5/13/19 at which time he will request scripts for refills. Myrl Alpers RN, [facility] Ambulatory Navigator, [facility] 4/30/2019 Pt presented to [facility] with Dr. Ezzie Dakins on 4/29/19 as directed. Is taking medications as directed. Has a swollen area right groin at insertion site for cath. Dr. Ezzie Dakins arranged for US to check for pseudoaneurysm. Pt denies pain or increasing swelling, discoloration, numbness in LE. Myrl Alpers RN, [facility] Ambulatory Navigator, [facility] 4/26/2019 S/p STEMI 4/23/19 Was given scripts for the following new medications: 
Atorvastatin 10 mg qhs 
Metoprolol Succinate 25mg XL every day Brilinta 90 mg BID 
ASA 81 mg every day PharmD referral placed to review pt's medications at his request and to review if he can take CBD oil supplement for sleep with current medication regime. Myrl Alpers RN, [facility] Ambulatory Navigator, [facility]  Patient verbalizes understanding of self -management goals after a Myocardial Infaraction. 5/8/2019 Right groin US negative for pseudoaneurysm. Is walking 15min/day. Is feeling well and is looking forward to starting Cardiac Wellness on 5/14/19 JAIME Torres RN, [facility] Ambulatory Navigator, [facility] 4/30/2019 R/o pseudoaneurysm right groin d/t swelling at cath site. Pt denies pain, numbness/tingling in extremity. Scheduled for US on 5/2/19. Agrees to contact Cardiologist or PCP or go to ER if any worsening of swelling, pain, numbness, cold extremity occurs. Myrl Alpers RN, [facility] Ambulatory Navigator, [facility] 4/26/2019 S/p STEMI 4/23/19 Right Groin Cath site care:  Put ice or a cold pack on the area for 10 to 20 minutes at a time to help with soreness or 
swelling. Put a thin cloth between the ice and your skin.  Pat incision dry after showering- no bathing/swimming for at least 1 week or until MD tells you it's ok.  If you are bleeding, lie down and press on the area for 15 minutes to try to make it stop. If the 
bleeding does not stop, call your doctor or seek immediate medical care. Pt will call 911 for any symptoms of heart attack, such as: 
? Chest pain or pressure, or a strange feeling in the chest. 
? Sweating. ? Shortness of breath. ? Nausea or vomiting. ? Pain, pressure, or a strange feeling in the back, neck, jaw, or upper belly or in one or 
both shoulders or arms. ? Lightheadedness or sudden weakness. ? A fast or irregular heartbeat. Joe Walton RN, [facility] Ambulatory Navigator, [facility] Internal [facility]

## 2019-05-13 ENCOUNTER — TELEPHONE (OUTPATIENT)
Dept: CARDIAC REHAB | Age: 84
End: 2019-05-13

## 2019-05-13 NOTE — TELEPHONE ENCOUNTER
5/13/2019 Cardiac Rehab: Called Mr. Skye Garcia to remind of intake appointment on Tuesday, 5/14/19. Confirmed appointment with patient. Provided patient with contact information for [facility]. Also, reminded patient to bring a list of current medications, a personal schedule, and to wear comfortable clothes and shoes.  Cj Brown

## 2019-05-13 NOTE — DISCHARGE SUMMARY
Cardiology Discharge Summary     Patient ID:  Fidelina Deleon  [id number]  85 y.o.  1935    Admit Date: 4/23/2019    Discharge Date: 5/13/2019     Admitting Physician: Orlando Nguyen MD     Discharge Physician: Orlando Nguyen MD    Admission Diagnoses: [facility] [I21.3]  STEMI (ST elevation myocardial infarction) ([facility] [I21.3]  Acute ST elevation myocardial infarction (STEMI) involving left anterior descending (LAD) coronary artery ([facility] [I21.02]    Discharge Diagnoses: Active Problems:    * No active hospital problems. *  s/p stents in LAD, proximal and mid, infarct vessel  S/p stent in LCX, threatened to close  2 vs CAD  Mild Cardiomyopathy; EF 50%, anteroseptal and apical hypokinesis  HTN  hyperlipidemia    Discharge Condition: Stable    Cardiology Procedures this Admission:  Left heart catheterization with PCI    Hospital Course: He presented with STEMI involving anterior wall, The cath showed totally occluded LAD but LCX was also tightly blocked with TIMI2 flow and proixmal 95% lesion as well. LAD received 2.0x18 [place] DENZEL at the mid -distal and 2.5x15 and proximal-mid segment with a good result. I went ahead and stented LCX as it was threatened to close as well. It received 3.0x22 [place] DENZEL with excellent result. His troponin peaked to 80s next am suggestive of re-flush of enzymes after two major vessel reperfusion. He tolerated the stents well. His echo showed EF 50% with anteroseptal and apical hypokinesis. He tolerated the STEMI with no complication and groin was fine with no problem. He was released in stable condition. Consults: None    Discharge Exam:     Visit Vitals  /62   Pulse 67   Temp 97.6 °F (36.4 °C)   Resp 20   Ht 6' (1.829 m)   Wt 160 lb (72.6 kg)   SpO2 98%   BMI 21.70 kg/m²     General Appearance:  Well developed, well nourished, in no acute distress. Ears/Nose/Mouth/Throat:   Hearing grossly normal.         Neck: Supple.    Chest:   Lungs clear to auscultation bilaterally. Normal resp effort. Cardiovascular:  Regular rate and rhythm, S1, S2 normal, no new murmur. Abdomen:   Soft, non-tender, bowel sounds are present. Extremities: No edema bilaterally. Neuro:  Skin: A/O x 3, grossly nonfocal. Normal mood/affect. Warm and dry. Disposition: home    Patient Instructions:   Cannot display discharge medications since this patient is not currently admitted. Referenced discharge instructions provided by nursing for diet and activity.     Follow-up with Dr. Diana Potter in two weeks     Signed:  Winnifred Bosworth, MD  5/13/2019  8:16 AM   .

## 2019-05-14 ENCOUNTER — HOSPITAL ENCOUNTER (OUTPATIENT)
Dept: CARDIAC REHAB | Age: 84
Discharge: HOME | End: 2019-05-14
Payer: MEDICARE

## 2019-05-14 VITALS — WEIGHT: 162 LBS | HEIGHT: 72 IN | BODY MASS INDEX: 21.94 KG/M2

## 2019-05-14 PROCEDURE — 93798 PHYS/QHP OP CAR RHAB W/ECG: CPT

## 2019-05-14 NOTE — CARDIO/PULMONARY
Alma Haider  81 y.o. presented to cardiac wellness for orientation and exercise tolerance test today with a primary diagnosis of STEMI  And 3 DENZEL . EF is 51-55%  . Alma Haider did not have a previous cardiac history. Cardiac risk factors include dyslipidemia, hypertension and age, and these were reviewed with Chani Pinto. Brian Shaikh lives with his wife. 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 He now volunteers with the [employer]. He admits to a good social support. He has 2 sons and their family in the [facility] area. PHQ9, depression score, is 2 and this is considered normal. The result was discussed with patient who agrees. Patient denied chest pain or SOB during 6 minute walk and was in SB/SR with rare PVC. Alma Haider will exercise  3 days a week in cardiac wellness. He also exercises at [employer]. Education manual given and reviewed briefly.      Case Isidro RN  5/14/2019

## 2019-05-17 ENCOUNTER — HOSPITAL ENCOUNTER (OUTPATIENT)
Dept: CARDIAC REHAB | Age: 84
Discharge: HOME | End: 2019-05-17
Payer: MEDICARE

## 2019-05-17 VITALS — BODY MASS INDEX: 21.7 KG/M2 | WEIGHT: 160 LBS

## 2019-05-17 PROCEDURE — 93798 PHYS/QHP OP CAR RHAB W/ECG: CPT

## 2019-05-21 ENCOUNTER — HOSPITAL ENCOUNTER (OUTPATIENT)
Dept: CARDIAC REHAB | Age: 84
Discharge: HOME | End: 2019-05-21
Payer: MEDICARE

## 2019-05-21 VITALS — BODY MASS INDEX: 21.62 KG/M2 | WEIGHT: 159.4 LBS

## 2019-05-21 PROCEDURE — 93798 PHYS/QHP OP CAR RHAB W/ECG: CPT

## 2019-05-22 ENCOUNTER — APPOINTMENT (OUTPATIENT)
Dept: CARDIAC REHAB | Age: 84
End: 2019-05-22
Payer: MEDICARE

## 2019-05-22 ENCOUNTER — PATIENT OUTREACH (OUTPATIENT)
Dept: INTERNAL MEDICINE CLINIC | Age: 84
End: 2019-05-22

## 2019-05-22 NOTE — PROGRESS NOTES
Patient has graduated from the Transitions of Care Coordination  program on 5/22/2019. Patient's symptoms are stable at this time. Patient/family has the ability to self-manage. Care management goals have been completed at this time. No further nurse navigator follow up scheduled. Goals Addressed                 This Visit's Progress       Myocardial Infarction     Knowledge and adherence of medication (ie. antiplatelet, B blocker, ACE, lipid lowering agent, action, side effects, missed dose, etc.)          5/22/2019  Pt is doing well- started Cardiac Wellness at [facility]. Attended appt with Dr. Isaak Santiago and states that no medication changes were made. He is getting ready to call Dr. Isaak Santiago to make sure refill for 90 day supply on the Brilinta was sent to his mail order pharmacy. Pt will call back to  if any issues. States he still has about a week supply left. Karan Summers RN, [facility]  Ambulatory Navigator, [address] [address] Internal Medicine  5/8/2019  Pt reports he is doing well. Groin is healed and he is back to walking 15 min/day. Is taking all medications as directed and will be seeing Dr. Isaak Santiago on 5/13/19 at which time he will request scripts for refills. Karan Summers RN, [facility]  Ambulatory Navigator, [facility] Internal Medicine    4/30/2019  Pt presented to [facility] with Dr. Gaby Suarez on 4/29/19 as directed. Is taking medications as directed. Has a swollen area right groin at insertion site for cath. Dr. Gaby Suarez arranged for US to check for pseudoaneurysm.  Pt denies pain or increasing swelling, discoloration, numbness in LE.   JAIME Torres RN, [facility]  Ambulatory Navigator, [facility] Internal Medicine  4/26/2019  S/p STEMI 4/23/19  Was given scripts for the following new medications:  Atorvastatin 10 mg qhs  Metoprolol Succinate 25mg XL every day  Brilinta 90 mg BID  ASA 81 mg every day   PharmD referral placed to review pt's medications at his request and to review if he can take CBD oil supplement for sleep with current medication regime. Marcin Bethea RN, [facility]  Ambulatory Navigator, [facility] Internal [facility]         Patient verbalizes understanding of self -management goals after a Myocardial Infaraction. 5/22/2019  Attending all appts as directed- taking meds as directed. Has scheduled f/u visits with both PCP and Cardiologist  5/8/2019  Right groin US negative for pseudoaneurysm. Is walking 15min/day. Is feeling well and is looking forward to starting Cardiac Wellness on 5/14/19  JAIME Torres RN, [facility]  Ambulatory Navigator, [facility]  4/30/2019  R/o pseudoaneurysm right groin d/t swelling at cath site. Pt denies pain, numbness/tingling in extremity. Scheduled for US on 5/2/19. Agrees to contact Cardiologist or PCP or go to ER if any worsening of swelling, pain, numbness, cold extremity occurs. Marcin Bethea RN, [facility]  Ambulatory Navigator, [facility] Internal [facility]   4/26/2019   S/p STEMI 4/23/19  Right Groin Cath site care:   Put ice or a cold pack on the area for 10 to 20 minutes at a time to help with soreness or  swelling. Put a thin cloth between the ice and your skin.  Pat incision dry after showering- no bathing/swimming for at least 1 week or until MD tells you it's ok.  If you are bleeding, lie down and press on the area for 15 minutes to try to make it stop. If the  bleeding does not stop, call your doctor or seek immediate medical care. Pt will call 911 for any symptoms of heart attack, such as:  ? Chest pain or pressure, or a strange feeling in the chest.  ? Sweating. ? Shortness of breath. ? Nausea or vomiting. ? Pain, pressure, or a strange feeling in the back, neck, jaw, or upper belly or in one or  both shoulders or arms. ? Lightheadedness or sudden weakness. ? A fast or irregular heartbeat.   Marcin Bethea RN, [facility]  Ambulatory Navigator, [facility] Internal [facility]            Pt has nurse navigator's contact information for any further questions, concerns, or needs.   Patients upcoming visits:    Future Appointments   Date Time Provider Valentino España   8/21/2019  9:30 AM Celina Mendes MD [id number] [facility]

## 2019-05-24 ENCOUNTER — HOSPITAL ENCOUNTER (OUTPATIENT)
Dept: CARDIAC REHAB | Age: 84
Discharge: HOME | End: 2019-05-24
Payer: MEDICARE

## 2019-05-24 VITALS — WEIGHT: 159.6 LBS | BODY MASS INDEX: 21.65 KG/M2

## 2019-05-24 PROCEDURE — 93798 PHYS/QHP OP CAR RHAB W/ECG: CPT

## 2019-05-24 PROCEDURE — 93797 PHYS/QHP OP CAR RHAB WO ECG: CPT | Performed by: DIETITIAN, REGISTERED

## 2019-05-24 NOTE — CARDIO/PULMONARY
Pt came in with a copy of his DNR. Discussed with pt that this means we would not give him medications or CPR if he had a problem here. Pt stated that he took it to mean that if he was out in the woods and something happened he would not be brought back and be a vegetable but that his doctor said in the hospital that the DNR doesn't mean anything as they would get to him immediately. Informed pt that we could intervene immediately if something happened - similar to the hospital. Pt stated he would talk with his doctor about whether he wanted the DNR to be active while he is here and let us know. Informed pt that we will treat him as a full code until he states otherwise.

## 2019-05-28 ENCOUNTER — HOSPITAL ENCOUNTER (OUTPATIENT)
Dept: CARDIAC REHAB | Age: 84
Discharge: HOME | End: 2019-05-28
Payer: MEDICARE

## 2019-05-28 ENCOUNTER — PATIENT OUTREACH (OUTPATIENT)
Dept: INTERNAL MEDICINE CLINIC | Age: 84
End: 2019-05-28

## 2019-05-28 VITALS — BODY MASS INDEX: 21.51 KG/M2 | WEIGHT: 158.6 LBS

## 2019-05-28 PROCEDURE — 93798 PHYS/QHP OP CAR RHAB W/ECG: CPT

## 2019-05-28 NOTE — PROGRESS NOTES
Received call from pt wanting to make sure he understood his DDNR properly since Cardiac Wellness is asking about it. Reviewed what a DDNR vs AMD is. Pt states he understands and will think about whether he wants to keep DDNR or change it. He will let us know if any changes needed. Offered to schedule him an appt with Dr. Ezzie Dakins if he wishes to further discuss.

## 2019-05-29 ENCOUNTER — HOSPITAL ENCOUNTER (OUTPATIENT)
Dept: CARDIAC REHAB | Age: 84
Discharge: HOME | End: 2019-05-29
Payer: MEDICARE

## 2019-05-29 VITALS — WEIGHT: 158.8 LBS | BODY MASS INDEX: 21.54 KG/M2

## 2019-05-29 PROCEDURE — 93798 PHYS/QHP OP CAR RHAB W/ECG: CPT

## 2019-05-31 ENCOUNTER — HOSPITAL ENCOUNTER (OUTPATIENT)
Dept: CARDIAC REHAB | Age: 84
Discharge: HOME | End: 2019-05-31
Payer: MEDICARE

## 2019-05-31 VITALS — BODY MASS INDEX: 21.67 KG/M2 | WEIGHT: 159.8 LBS

## 2019-05-31 PROCEDURE — 93798 PHYS/QHP OP CAR RHAB W/ECG: CPT

## 2019-06-04 ENCOUNTER — HOSPITAL ENCOUNTER (OUTPATIENT)
Dept: CARDIAC REHAB | Age: 84
Discharge: HOME | End: 2019-06-04
Payer: MEDICARE

## 2019-06-04 VITALS — WEIGHT: 159 LBS | BODY MASS INDEX: 21.56 KG/M2

## 2019-06-04 PROCEDURE — 93798 PHYS/QHP OP CAR RHAB W/ECG: CPT

## 2019-06-05 ENCOUNTER — HOSPITAL ENCOUNTER (OUTPATIENT)
Dept: CARDIAC REHAB | Age: 84
Discharge: HOME | End: 2019-06-05
Payer: MEDICARE

## 2019-06-05 VITALS — WEIGHT: 158 LBS | BODY MASS INDEX: 21.43 KG/M2

## 2019-06-05 PROCEDURE — 93798 PHYS/QHP OP CAR RHAB W/ECG: CPT

## 2019-06-07 ENCOUNTER — HOSPITAL ENCOUNTER (OUTPATIENT)
Dept: CARDIAC REHAB | Age: 84
Discharge: HOME | End: 2019-06-07
Payer: MEDICARE

## 2019-06-07 VITALS — BODY MASS INDEX: 21.46 KG/M2 | WEIGHT: 158.2 LBS

## 2019-06-07 PROCEDURE — 93798 PHYS/QHP OP CAR RHAB W/ECG: CPT

## 2019-06-11 ENCOUNTER — HOSPITAL ENCOUNTER (OUTPATIENT)
Dept: CARDIAC REHAB | Age: 84
Discharge: HOME | End: 2019-06-11
Payer: MEDICARE

## 2019-06-11 VITALS — BODY MASS INDEX: 21.48 KG/M2 | WEIGHT: 158.4 LBS

## 2019-06-11 PROCEDURE — 93798 PHYS/QHP OP CAR RHAB W/ECG: CPT

## 2019-06-12 ENCOUNTER — HOSPITAL ENCOUNTER (OUTPATIENT)
Dept: CARDIAC REHAB | Age: 84
Discharge: HOME | End: 2019-06-12
Payer: MEDICARE

## 2019-06-12 VITALS — WEIGHT: 158.2 LBS | BODY MASS INDEX: 21.46 KG/M2

## 2019-06-12 PROCEDURE — 93798 PHYS/QHP OP CAR RHAB W/ECG: CPT

## 2019-06-14 ENCOUNTER — HOSPITAL ENCOUNTER (OUTPATIENT)
Dept: CARDIAC REHAB | Age: 84
Discharge: HOME | End: 2019-06-14
Payer: MEDICARE

## 2019-06-14 VITALS — BODY MASS INDEX: 21.59 KG/M2 | WEIGHT: 159.2 LBS

## 2019-06-14 PROCEDURE — 93798 PHYS/QHP OP CAR RHAB W/ECG: CPT

## 2019-06-18 ENCOUNTER — HOSPITAL ENCOUNTER (OUTPATIENT)
Dept: CARDIAC REHAB | Age: 84
Discharge: HOME | End: 2019-06-18
Payer: MEDICARE

## 2019-06-18 VITALS — WEIGHT: 159.4 LBS | BODY MASS INDEX: 21.62 KG/M2

## 2019-06-18 PROCEDURE — 93798 PHYS/QHP OP CAR RHAB W/ECG: CPT

## 2019-06-19 ENCOUNTER — HOSPITAL ENCOUNTER (OUTPATIENT)
Dept: CARDIAC REHAB | Age: 84
Discharge: HOME | End: 2019-06-19
Payer: MEDICARE

## 2019-06-19 VITALS — BODY MASS INDEX: 21.56 KG/M2 | WEIGHT: 159 LBS

## 2019-06-19 PROCEDURE — 93798 PHYS/QHP OP CAR RHAB W/ECG: CPT

## 2019-06-21 ENCOUNTER — HOSPITAL ENCOUNTER (OUTPATIENT)
Dept: CARDIAC REHAB | Age: 84
Discharge: HOME | End: 2019-06-21
Payer: MEDICARE

## 2019-06-21 VITALS — WEIGHT: 157.4 LBS | BODY MASS INDEX: 21.35 KG/M2

## 2019-06-21 PROCEDURE — 93798 PHYS/QHP OP CAR RHAB W/ECG: CPT

## 2019-06-25 ENCOUNTER — HOSPITAL ENCOUNTER (OUTPATIENT)
Dept: CARDIAC REHAB | Age: 84
Discharge: HOME | End: 2019-06-25
Payer: MEDICARE

## 2019-06-25 VITALS — WEIGHT: 157 LBS | BODY MASS INDEX: 21.29 KG/M2

## 2019-06-25 PROCEDURE — 93798 PHYS/QHP OP CAR RHAB W/ECG: CPT

## 2019-06-26 ENCOUNTER — HOSPITAL ENCOUNTER (OUTPATIENT)
Dept: CARDIAC REHAB | Age: 84
Discharge: HOME | End: 2019-06-26
Payer: MEDICARE

## 2019-06-26 VITALS — BODY MASS INDEX: 21.24 KG/M2 | WEIGHT: 156.6 LBS

## 2019-06-26 PROCEDURE — 93798 PHYS/QHP OP CAR RHAB W/ECG: CPT

## 2019-06-28 ENCOUNTER — HOSPITAL ENCOUNTER (OUTPATIENT)
Dept: CARDIAC REHAB | Age: 84
Discharge: HOME | End: 2019-06-28
Payer: MEDICARE

## 2019-06-28 VITALS — WEIGHT: 155.2 LBS | BODY MASS INDEX: 21.05 KG/M2

## 2019-06-28 PROCEDURE — 93798 PHYS/QHP OP CAR RHAB W/ECG: CPT

## 2019-07-02 ENCOUNTER — HOSPITAL ENCOUNTER (OUTPATIENT)
Dept: CARDIAC REHAB | Age: 84
Discharge: HOME | End: 2019-07-02
Payer: MEDICARE

## 2019-07-02 VITALS — BODY MASS INDEX: 21.43 KG/M2 | WEIGHT: 158 LBS

## 2019-07-02 PROCEDURE — 93798 PHYS/QHP OP CAR RHAB W/ECG: CPT

## 2019-07-03 ENCOUNTER — HOSPITAL ENCOUNTER (OUTPATIENT)
Dept: CARDIAC REHAB | Age: 84
Discharge: HOME | End: 2019-07-03
Payer: MEDICARE

## 2019-07-03 VITALS — BODY MASS INDEX: 21.29 KG/M2 | WEIGHT: 157 LBS

## 2019-07-03 PROCEDURE — 93798 PHYS/QHP OP CAR RHAB W/ECG: CPT

## 2019-07-05 ENCOUNTER — HOSPITAL ENCOUNTER (OUTPATIENT)
Dept: CARDIAC REHAB | Age: 84
Discharge: HOME | End: 2019-07-05
Payer: MEDICARE

## 2019-07-05 VITALS — BODY MASS INDEX: 21.32 KG/M2 | WEIGHT: 157.2 LBS

## 2019-07-05 PROCEDURE — 93798 PHYS/QHP OP CAR RHAB W/ECG: CPT

## 2019-07-09 ENCOUNTER — HOSPITAL ENCOUNTER (OUTPATIENT)
Dept: CARDIAC REHAB | Age: 84
Discharge: HOME | End: 2019-07-09
Payer: MEDICARE

## 2019-07-09 VITALS — WEIGHT: 155.6 LBS | BODY MASS INDEX: 21.1 KG/M2

## 2019-07-09 PROCEDURE — 93798 PHYS/QHP OP CAR RHAB W/ECG: CPT

## 2019-07-10 ENCOUNTER — HOSPITAL ENCOUNTER (OUTPATIENT)
Dept: CARDIAC REHAB | Age: 84
Discharge: HOME | End: 2019-07-10
Payer: MEDICARE

## 2019-07-10 VITALS — WEIGHT: 157.2 LBS | BODY MASS INDEX: 21.32 KG/M2

## 2019-07-10 PROCEDURE — 93798 PHYS/QHP OP CAR RHAB W/ECG: CPT

## 2019-07-12 ENCOUNTER — HOSPITAL ENCOUNTER (OUTPATIENT)
Dept: CARDIAC REHAB | Age: 84
Discharge: HOME | End: 2019-07-12
Payer: MEDICARE

## 2019-07-12 VITALS — BODY MASS INDEX: 21.43 KG/M2 | WEIGHT: 158 LBS

## 2019-07-12 PROCEDURE — 93798 PHYS/QHP OP CAR RHAB W/ECG: CPT

## 2019-07-16 ENCOUNTER — HOSPITAL ENCOUNTER (OUTPATIENT)
Dept: CARDIAC REHAB | Age: 84
Discharge: HOME | End: 2019-07-16
Payer: MEDICARE

## 2019-07-16 VITALS — WEIGHT: 155.2 LBS | BODY MASS INDEX: 21.05 KG/M2

## 2019-07-16 PROCEDURE — 93798 PHYS/QHP OP CAR RHAB W/ECG: CPT

## 2019-07-17 ENCOUNTER — HOSPITAL ENCOUNTER (OUTPATIENT)
Dept: CARDIAC REHAB | Age: 84
Discharge: HOME | End: 2019-07-17
Payer: MEDICARE

## 2019-07-17 VITALS — BODY MASS INDEX: 21.12 KG/M2 | WEIGHT: 155.7 LBS

## 2019-07-17 PROCEDURE — 93798 PHYS/QHP OP CAR RHAB W/ECG: CPT

## 2019-07-19 ENCOUNTER — HOSPITAL ENCOUNTER (OUTPATIENT)
Dept: CARDIAC REHAB | Age: 84
Discharge: HOME | End: 2019-07-19
Payer: MEDICARE

## 2019-07-19 VITALS — WEIGHT: 155 LBS | BODY MASS INDEX: 21.02 KG/M2

## 2019-07-19 PROCEDURE — 93798 PHYS/QHP OP CAR RHAB W/ECG: CPT

## 2019-07-23 ENCOUNTER — HOSPITAL ENCOUNTER (OUTPATIENT)
Dept: CARDIAC REHAB | Age: 84
Discharge: HOME | End: 2019-07-23
Payer: MEDICARE

## 2019-07-23 VITALS — BODY MASS INDEX: 21.05 KG/M2 | WEIGHT: 155.2 LBS

## 2019-07-23 PROCEDURE — 93798 PHYS/QHP OP CAR RHAB W/ECG: CPT

## 2019-07-24 ENCOUNTER — HOSPITAL ENCOUNTER (OUTPATIENT)
Dept: CARDIAC REHAB | Age: 84
Discharge: HOME | End: 2019-07-24
Payer: MEDICARE

## 2019-07-24 VITALS — WEIGHT: 154.4 LBS | BODY MASS INDEX: 20.94 KG/M2

## 2019-07-24 PROCEDURE — 93798 PHYS/QHP OP CAR RHAB W/ECG: CPT

## 2019-07-26 ENCOUNTER — HOSPITAL ENCOUNTER (OUTPATIENT)
Dept: CARDIAC REHAB | Age: 84
Discharge: HOME | End: 2019-07-26
Payer: MEDICARE

## 2019-07-26 VITALS — WEIGHT: 155 LBS | BODY MASS INDEX: 21.02 KG/M2

## 2019-07-26 PROCEDURE — 93798 PHYS/QHP OP CAR RHAB W/ECG: CPT

## 2019-07-30 ENCOUNTER — HOSPITAL ENCOUNTER (OUTPATIENT)
Dept: CARDIAC REHAB | Age: 84
Discharge: HOME | End: 2019-07-30
Payer: MEDICARE

## 2019-07-30 VITALS — WEIGHT: 155 LBS | BODY MASS INDEX: 21.02 KG/M2

## 2019-07-30 PROCEDURE — 93798 PHYS/QHP OP CAR RHAB W/ECG: CPT

## 2019-07-31 ENCOUNTER — HOSPITAL ENCOUNTER (OUTPATIENT)
Dept: CARDIAC REHAB | Age: 84
Discharge: HOME | End: 2019-07-31
Payer: MEDICARE

## 2019-07-31 VITALS — WEIGHT: 154.4 LBS | BODY MASS INDEX: 20.94 KG/M2

## 2019-07-31 PROCEDURE — 93798 PHYS/QHP OP CAR RHAB W/ECG: CPT

## 2019-08-02 ENCOUNTER — HOSPITAL ENCOUNTER (OUTPATIENT)
Dept: CARDIAC REHAB | Age: 84
Discharge: HOME | End: 2019-08-02
Payer: MEDICARE

## 2019-08-02 VITALS — BODY MASS INDEX: 20.94 KG/M2 | WEIGHT: 154.4 LBS

## 2019-08-02 PROCEDURE — 93798 PHYS/QHP OP CAR RHAB W/ECG: CPT

## 2019-08-06 ENCOUNTER — HOSPITAL ENCOUNTER (OUTPATIENT)
Dept: CARDIAC REHAB | Age: 84
Discharge: HOME | End: 2019-08-06
Payer: MEDICARE

## 2019-08-06 VITALS — WEIGHT: 156 LBS | BODY MASS INDEX: 21.16 KG/M2

## 2019-08-06 PROCEDURE — 93798 PHYS/QHP OP CAR RHAB W/ECG: CPT

## 2019-08-07 ENCOUNTER — APPOINTMENT (OUTPATIENT)
Dept: CARDIAC REHAB | Age: 84
End: 2019-08-07
Payer: MEDICARE

## 2019-08-09 ENCOUNTER — APPOINTMENT (OUTPATIENT)
Dept: CARDIAC REHAB | Age: 84
End: 2019-08-09
Payer: MEDICARE

## 2019-08-13 ENCOUNTER — APPOINTMENT (OUTPATIENT)
Dept: CARDIAC REHAB | Age: 84
End: 2019-08-13
Payer: MEDICARE

## 2019-08-21 ENCOUNTER — OFFICE VISIT (OUTPATIENT)
Dept: INTERNAL MEDICINE CLINIC | Age: 84
End: 2019-08-21

## 2019-08-21 VITALS
TEMPERATURE: 97.6 F | HEIGHT: 68 IN | DIASTOLIC BLOOD PRESSURE: 58 MMHG | BODY MASS INDEX: 23.34 KG/M2 | RESPIRATION RATE: 16 BRPM | OXYGEN SATURATION: 97 % | SYSTOLIC BLOOD PRESSURE: 110 MMHG | WEIGHT: 154 LBS | HEART RATE: 52 BPM

## 2019-08-21 DIAGNOSIS — F51.01 PRIMARY INSOMNIA: ICD-10-CM

## 2019-08-21 DIAGNOSIS — I25.10 CORONARY ARTERY DISEASE INVOLVING NATIVE CORONARY ARTERY OF NATIVE HEART WITHOUT ANGINA PECTORIS: ICD-10-CM

## 2019-08-21 DIAGNOSIS — N40.1 BPH ASSOCIATED WITH NOCTURIA: ICD-10-CM

## 2019-08-21 DIAGNOSIS — Z13.31 SCREENING FOR DEPRESSION: ICD-10-CM

## 2019-08-21 DIAGNOSIS — Z00.00 MEDICARE ANNUAL WELLNESS VISIT, SUBSEQUENT: Primary | ICD-10-CM

## 2019-08-21 DIAGNOSIS — Z13.39 SCREENING FOR ALCOHOLISM: ICD-10-CM

## 2019-08-21 DIAGNOSIS — R35.1 BPH ASSOCIATED WITH NOCTURIA: ICD-10-CM

## 2019-08-21 DIAGNOSIS — Z71.89 ADVANCED CARE PLANNING/COUNSELING DISCUSSION: ICD-10-CM

## 2019-08-21 RX ORDER — FELODIPINE 10 MG/1
TABLET, EXTENDED RELEASE ORAL
Qty: 90 TAB | Refills: 3 | Status: SHIPPED | COMMUNITY
Start: 2019-08-21 | End: 2020-11-19 | Stop reason: SDUPTHER

## 2019-08-21 NOTE — PROGRESS NOTES
Olamide Leo is a 81 y.o. male who was seen in clinic today (8/21/2019) for a full physical.          Assessment & Plan:   Diagnoses and all orders for this visit:    1. Medicare annual wellness visit, subsequent    2. Advanced care planning/counseling discussion    3. Screening for alcoholism  -     CT ANNUAL ALCOHOL SCREEN 15 MIN    4. Screening for depression  -     DEPRESSION SCREEN ANNUAL    5. Coronary artery disease involving native coronary artery of native heart without angina pectoris- asymptomatic. BP is well controlled, lipids are of unknown control. Continue: current plan. Will defer to cardiologist.  Due for labs since starting statin. On moderate intensity statin. -     felodipine (PLENDIL SR) 10 mg 24 hr tablet; Take 1 tablet by mouth  daily    6. BPH associated with nocturia- stable, not ideally controlled, defer to specialist, reviewed procedure options but can't consider at this time, it is not effecting his quality of life so recommended no changes    7. Primary insomnia- stable, continue current treatment, worse due to #6 but getting good relief w/ OTC Melatonin and 10-15 min nap in afternoon. Follow-up and Dispositions    · Return in about 1 year (around 8/21/2020), or if symptoms worsen or fail to improve, for FULL PHYSICAL - 30 minutes. ------------------------------------------------------------------------------------------    Subjective: Annual Wellness Visit- Subsequent Visit    End of Life Planning: This was discussed with him today and he has an advanced directive - a copy has been provided. Reviewed DNR/DNI and patient is interested in remaining a DNR, no changes made.       Depression Screen:   3 most recent PHQ Screens 8/21/2019   Little interest or pleasure in doing things Not at all   Feeling down, depressed, irritable, or hopeless Not at all   Total Score PHQ 2 0   Trouble falling or staying asleep, or sleeping too much -   Feeling tired or having little energy -   Poor appetite, weight loss, or overeating -   Feeling bad about yourself - or that you are a failure or have let yourself or your family down -   Trouble concentrating on things such as school, work, reading, or watching TV -   Moving or speaking so slowly that other people could have noticed; or the opposite being so fidgety that others notice -   Thoughts of being better off dead, or hurting yourself in some way -   PHQ 9 Score -   How difficult have these problems made it for you to do your work, take care of your home and get along with others -       Fall Risk:   Fall Risk Assessment, last 12 mths 8/21/2019   Able to walk? Yes   Fall in past 12 months? No       Abuse Screen:  Abuse Screening Questionnaire 8/21/2019   Do you ever feel afraid of your partner? N   Are you in a relationship with someone who physically or mentally threatens you? N   Is it safe for you to go home? Y         Alcohol Risk Factor Screening: On any occasion during the past 3 months, have you had more than 4 drinks containing alcohol? No  Do you average more than 14 drinks per week? No    Hearing Loss: The patient wears hearing aids. Cognition Screen:  Has your family/caregiver stated any concerns about your memory: no  Cognition: appears appropriate for age attention/concentration and appears appropriate safety awareness    Activities of Daily Living:    Requires assistance with: no ADLs  Home contains: no safety equipment. Exercise: very active    Adult Nutrition Screen:  No risk factors noted. Weight is down 6-7 lbs with life style changes.       Health Maintenance  Daily Aspirin: yes  AAA Screening: n/a  Glaucoma Screening: UTD      Immunizations:    Influenza: he will plan on getting it this fall   Tetanus: up to date   Shingles: received 1 of 2 shots   Pneumonia: up to date  Cancer screening:   Prostate: guidelines reviewed, n/a  Colon: guidelines reviewed, n/a      Patient Care Team:  Nneka Rene MD as PCP - General (Internal Medicine)  Monse Gil MD (Urology)  Ya Bailey MD (Dermatology)  Caryle Simmer, MD as Physician (Ophthalmology)  Angélica Delatorre MD as Physician (Orthopedic Surgery)  Katerina Gill MD (Internal Medicine)  Carlton Cabrera MD (Cardiology)       In addition to the above issues we also reviewed the following acute and/or chronic problems:    Cardiovascular Review  The patient has coronary artery disease. He had STEMI in April. Has followed up with cardiology and completed cardiac rehab. He reports taking medications as instructed, no medication side effects noted. Diet and Lifestyle: generally follows a low fat low cholesterol diet, generally follows a low sodium diet, exercises regularly. Labs: reviewed and discussed with patient. The following sections were reviewed & updated as appropriate: PMH, PSH, FH, and SH. 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 Authorizing Provider   felodipine (PLENDIL SR) 10 mg 24 hr tablet Take 1 tablet by mouth  daily 4/29/19  Yes Katerina Gill MD   aspirin 81 mg chewable tablet Take 1 Tab by mouth daily. 4/25/19  Yes Carlton Cabrera MD   atorvastatin (LIPITOR) 10 mg tablet Take 1 Tab by mouth nightly. 4/24/19  Yes Carlton Cabrera MD   metoprolol succinate (TOPROL-XL) 25 mg XL tablet Take 1 Tab by mouth daily. 4/25/19  Yes Carlton Cabrera MD   ticagrelor (BRILINTA) 90 mg tablet Take 1 Tab by mouth two (2) times a day. 4/24/19  Yes Carlton Cabrera MD   MELATONIN PO Take 3 mg by mouth nightly as needed. Yes Provider, Historical   silodosin (RAPAFLO PO) Take  by mouth daily.    Yes Provider, Historical   finasteride (PROSCAR) 5 mg tablet Take 5 mg by mouth daily. 8/15/15  Yes Provider, Historical   multivitamins-minerals-lutein (CENTRUM SILVER) Tab Take 1 Tab by mouth two (2) times a week. Yes Provider, Historical          No Known Allergies           Review of Systems   Constitutional: Negative for chills and fever. Respiratory: Negative for cough and shortness of breath. Cardiovascular: Negative for chest pain and palpitations. Gastrointestinal: Negative for abdominal pain, blood in stool, constipation, diarrhea, heartburn, nausea and vomiting. Genitourinary:        He reports: nocturia x 4-6. He denies: urinary hesitancy, weak stream, dysuria. Musculoskeletal: Negative for joint pain and myalgias. Neurological: Negative for tingling, focal weakness and headaches. Endo/Heme/Allergies: Does not bruise/bleed easily. Psychiatric/Behavioral: Negative for depression. The patient is not nervous/anxious and does not have insomnia. Objective:   Physical Exam   Constitutional: No distress. Eyes: Conjunctivae are normal. No scleral icterus. Cardiovascular: Regular rhythm and normal heart sounds. Bradycardia present. No murmur heard. Pulmonary/Chest: Effort normal and breath sounds normal. He has no wheezes. He has no rales. Abdominal: Soft. Bowel sounds are normal. He exhibits no mass. There is no hepatosplenomegaly. There is no tenderness. Musculoskeletal: He exhibits no edema. Skin: No rash noted. Middle thoracic back there is a 3cm, subcutaneous mass, mobile, non-tender   Psychiatric: He has a normal mood and affect.  His behavior is normal.          Visit Vitals  /58   Pulse (!) 52   Temp 97.6 °F (36.4 °C) (Oral)   Resp 16   Ht 5' 7.5\" (1.715 m)   Wt 154 lb (69.9 kg)   SpO2 97%   BMI 23.76 kg/m²          Advised him to call back or return to office if symptoms worsen/change/persist.  Discussed expected course/resolution/complications of diagnosis in detail with patient. Medication risks/benefits/costs/interactions/alternatives discussed with patient. He was given an after visit summary which includes diagnoses, current medications, & vitals. He expressed understanding with the diagnosis and plan. Aspects of this note may have been generated using voice recognition software. Despite editing, there may be some syntax errors.        Abhishek Ford MD

## 2019-08-21 NOTE — ACP (ADVANCE CARE PLANNING)
Advance Care Planning (ACP) Provider Note - Comprehensive     Date of ACP Conversation: 08/21/19  Persons included in Conversation:  patient  Length of ACP Conversation in minutes: <16 minutes (Non-Billable)    Authorized Decision Maker (if patient is incapable of making informed decisions):   Healthcare Agent/Medical Power of  under Advance Directive      He has an advanced directive - a copy has been provided & still reflects him wishes. Reviewed DNR/DNI and patient is interested in remaining a DNR, no changes made. We reviewed this in depth and no changes made. General ACP for ALL Patients with Decision Making Capacity:  Importance of advance care planning, including choosing a healthcare agent to communicate patient's healthcare decisions if patient lost the ability to make decisions, such as after a sudden illness or accident  Understanding of the healthcare agent role was assessed and information provided  Opportunity offered to explore how cultural, Christianity, spiritual, or personal beliefs would affect decisions for future care  Exploration of values, goals, and preferences if recovery is not expected, even with continued medical treatment in the event of: Imminent death or severe, permanent brain injury    For Serious or Chronic Illness:  Understanding of CPR, goals and expected outcomes, benefits and burdens discussed.   Understanding of medical condition  Information on CPR success rates provided (e.g. for CPR in hospital, survival to d/c at two weeks is 22%, for chronically ill or elderly/frail survival is less than 3%)    Interventions Provided:  Recommended communicating the plan and making copies for the healthcare agent, personal physician, and others as appropriate (e.g., health system)  Recommended review of completed ACP document annually or upon change in health status

## 2019-08-21 NOTE — PATIENT INSTRUCTIONS
Medicare Wellness Visit, Male    The best way to live healthy is to have a lifestyle where you eat a well-balanced diet, exercise regularly, limit alcohol use, and quit all forms of tobacco/nicotine, if applicable. Regular preventive services are another way to keep healthy. Preventive services (vaccines, screening tests, monitoring & exams) can help personalize your care plan, which helps you manage your own care. Screening tests can find health problems at the earliest stages, when they are easiest to treat. [facility] follows the current, evidence-based guidelines published by the [facility] ([facility]) when recommending preventive services for our patients. Because we follow these guidelines, sometimes recommendations change over time as research supports it. (For example, a prostate screening blood test is no longer routinely recommended for men with no symptoms.)  Of course, you and your doctor may decide to screen more often for some diseases, based on your risk and co-morbidities (chronic disease you are already diagnosed with). Preventive services for you include:  - Medicare offers their members a free annual wellness visit, which is time for you and your primary care provider to discuss and plan for your preventive service needs. Take advantage of this benefit every year!  -All adults over age 72 should receive the recommended pneumonia vaccines. Current USPSTF guidelines recommend a series of two vaccines for the best pneumonia protection.   -All adults should have a flu vaccine yearly and an ECG.  All adults age 61 and older should receive a shingles vaccine once in their lifetime.    -All adults age 38-68 who are overweight should have a diabetes screening test once every three years.   -Other screening tests & preventive services for persons with diabetes include: an eye exam to screen for diabetic retinopathy, a kidney function test, a foot exam, and stricter control over your cholesterol.   -Cardiovascular screening for adults with routine risk involves an electrocardiogram (ECG) at intervals determined by the provider.   -Colorectal cancer screening should be done for adults age 54-65 with no increased risk factors for colorectal cancer. There are a number of acceptable methods of screening for this type of cancer. Each test has its own benefits and drawbacks. Discuss with your provider what is most appropriate for you during your annual wellness visit. The different tests include: colonoscopy (considered the best screening method), a fecal occult blood test, a fecal DNA test, and sigmoidoscopy.  -All adults born between [facility] should be screened once for Hepatitis C.  -An Abdominal Aortic Aneurysm (AAA) Screening is recommended for men age 73-68 who has ever smoked in their lifetime. Here is a list of your current Health Maintenance items (your personalized list of preventive services) with a due date:  Health Maintenance Due   Topic Date Due    Shingles Vaccine (1 of 2) 07/07/1986    Flu Vaccine  08/01/2019    Annual Well Visit  08/28/2019          Learning About Living Gabriela  What is a living will? A living will is a legal form you use to write down the kind of care you want at the end of your life. It is used by the health professionals who will treat you if you aren't able to decide for yourself. If you put your wishes in writing, your loved ones and others will know what kind of care you want. They won't need to guess. This can ease your mind and be helpful to others. A living will is not the same as an estate or property will. An estate will explains what you want to happen with your money and property after you die. Is a living will a legal document? A living will is a legal document. Each state has its own laws about living rae.  If you move to another state, make sure that your living will is legal in the state where you now live. Or you might use a universal form that has been approved by many states. This kind of form can sometimes be completed and stored online. Your electronic copy will then be available wherever you have a connection to the Internet. In most cases, doctors will respect your wishes even if you have a form from a different state. · You don't need an  to complete a living will. But legal advice can be helpful if your state's laws are unclear, your health history is complicated, or your family can't agree on what should be in your living will. · You can change your living will at any time. Some people find that their wishes about end-of-life care change as their health changes. · In addition to making a living will, think about completing a medical power of  form. This form lets you name the person you want to make end-of-life treatment decisions for you (your \"health care agent\") if you're not able to. Many hospitals and nursing homes will give you the forms you need to complete a living will and a medical power of . · Your living will is used only if you can't make or communicate decisions for yourself anymore. If you become able to make decisions again, you can accept or refuse any treatment, no matter what you wrote in your living will. · Your state may offer an online registry. This is a place where you can store your living will online so the doctors and nurses who need to treat you can find it right away. What should you think about when creating a living will? Talk about your end-of-life wishes with your family members and your doctor. Let them know what you want. That way the people making decisions for you won't be surprised by your choices. Think about these questions as you make your living will:  · Do you know enough about life support methods that might be used?  If not, talk to your doctor so you know what might be done if you can't breathe on your own, your heart stops, or you're unable to swallow. · What things would you still want to be able to do after you receive life-support methods? Would you want to be able to walk? To speak? To eat on your own? To live without the help of machines? · If you have a choice, where do you want to be cared for? In your home? At a hospital or nursing home? · Do you want certain [employer] practices performed if you become very ill? · If you have a choice at the end of your life, where would you prefer to die? At home? In a hospital or nursing home? Somewhere else? · Would you prefer to be buried or cremated? · Do you want your organs to be donated after you die? What should you do with your living will? · Make sure that your family members and your health care agent have copies of your living will. · Give your doctor a copy of your living will to keep in your medical record. If you have more than one doctor, make sure that each one has a copy. · You may want to put a copy of your living will where it can be easily found. Where can you learn more? Go to http://nara-caroline.info/. Enter R693 in the search box to learn more about \"Learning About Living Perroy. \"  Current as of: August 8, 2016  Content Version: 11.3  © 4173-0365 [employer]. Care instructions adapted under license by [employer] (which disclaims liability or warranty for this information). If you have questions about a medical condition or this instruction, always ask your healthcare professional. [place] [address] any warranty or liability for your use of this information.

## 2019-11-07 NOTE — CARDIO/PULMONARY
Olamide Leo  Completed phase II cardiac rehab and attended 36 sessions from 05/14/19 - 08/06/19. Olamide Leo is interested in maintaining optimal health and will work with Dr. Zain Rowe. Olamide Leo has improved his endurance and stamina through regular exercise, lost 8 lbs and 1/2 inch from his waist. Blood pressure is 108/54 and is WNL. He has also improved his nutrition and maintained his scores on the [employer] and depression questionnaires. These were reviewed with patient. Olamide Leo plans to continue exercising at a gym and has set revised goals that include using cardio equipment, light weights and walking, 3 to 5 times a week for 40 to 60 minutes.   Yoel Hamilton RN  11/7/2019

## 2020-07-13 NOTE — PATIENT INSTRUCTIONS
Sleep Medication Recommendations:    Over the counter:  Melatonin    Prescriptions:  Trazodone          Learning About Sleeping Well  What does sleeping well mean? Sleeping well means getting enough sleep. How much sleep is enough varies among people. The number of hours you sleep is not as important as how you feel when you wake up. If you do not feel refreshed, you probably need more sleep. Another sign of not getting enough sleep is feeling tired during the day. The average total nightly sleep time is 7½ to 8 hours. Healthy adults may need a little more or a little less than this. Why is getting enough sleep important? Getting enough quality sleep is a basic part of good health. When your sleep suffers, your mood and your thoughts can suffer too. You may find yourself feeling more grumpy or stressed. Not getting enough sleep also can lead to serious problems, including injury, accidents, anxiety, and depression. What might cause poor sleeping? Many things can cause sleep problems, including:  · Stress. Stress can be caused by fear about a single event, such as giving a speech. Or you may have ongoing stress, such as worry about work or school. · Depression, anxiety, and other mental or emotional conditions. · Changes in your sleep habits or surroundings. This includes changes that happen where you sleep, such as noise, light, or sleeping in a different bed. It also includes changes in your sleep pattern, such as having jet lag or working a late shift. · Health problems, such as pain, breathing problems, and restless legs syndrome. · Lack of regular exercise. How can you help yourself? Here are some tips that may help you sleep more soundly and wake up feeling more refreshed. Your sleeping area  · Use your bedroom only for sleeping and sex. A bit of light reading may help you fall asleep. But if it doesn't, do your reading elsewhere in the house. Don't watch TV in bed.   · Be sure your bed is big GENERAL SURGERY PROGRESS NOTE    SUBJECTIVE  Patient seen and examined.  denies pain        OBJECTIVE    PHYSICAL EXAM  General: Appears well, NAD  CHEST: breathing comfortably  CV: appears well perfused  Abdomen: soft, nontender, nondistended, incisions c/d/i  Extremities: Grossly symmetric    T(C): 36.7 (07-13-20 @ 09:10), Max: 37.3 (07-12-20 @ 16:56)  HR: 92 (07-13-20 @ 09:10) (80 - 94)  BP: 146/83 (07-13-20 @ 09:10) (124/68 - 146/83)  RR: 18 (07-13-20 @ 09:10) (18 - 18)  SpO2: 97% (07-13-20 @ 09:10) (93% - 97%)    07-12-20 @ 07:01  -  07-13-20 @ 07:00  --------------------------------------------------------  IN: 760 mL / OUT: 2900 mL / NET: -2140 mL    07-13-20 @ 07:01  -  07-13-20 @ 10:40  --------------------------------------------------------  IN: 240 mL / OUT: 0 mL / NET: 240 mL        MEDICATIONS  acetaminophen   Tablet .. 650 milliGRAM(s) Oral every 6 hours PRN  amLODIPine   Tablet 5 milliGRAM(s) Oral at bedtime  atorvastatin 40 milliGRAM(s) Oral at bedtime  dextrose 40% Gel 15 Gram(s) Oral once PRN  dextrose 5%. 1000 milliLiter(s) IV Continuous <Continuous>  dextrose 50% Injectable 12.5 Gram(s) IV Push once  dextrose 50% Injectable 25 Gram(s) IV Push once  dextrose 50% Injectable 25 Gram(s) IV Push once  enoxaparin Injectable 40 milliGRAM(s) SubCutaneous daily  glucagon  Injectable 1 milliGRAM(s) IntraMuscular once PRN  ibuprofen  Tablet. 600 milliGRAM(s) Oral every 6 hours PRN  insulin lispro (HumaLOG) corrective regimen sliding scale   SubCutaneous three times a day with meals  insulin lispro (HumaLOG) corrective regimen sliding scale   SubCutaneous at bedtime  oxyCODONE    IR 5 milliGRAM(s) Oral every 4 hours PRN      LABS                        8.4    7.46  )-----------( 369      ( 13 Jul 2020 08:40 )             28.8                 RADIOLOGY & ADDITIONAL STUDIES enough to stretch out comfortably, especially if you have a sleep partner. · Keep your bedroom quiet, dark, and cool. Use curtains, blinds, or a sleep mask to block out light. To block out noise, use earplugs, soothing music, or a \"white noise\" machine. Your evening and bedtime routine  · Create a relaxing bedtime routine. You might want to take a warm shower or bath, listen to soothing music, or drink a cup of noncaffeinated tea. · Go to bed at the same time every night. And get up at the same time every morning, even if you feel tired. What to avoid  · Limit caffeine (coffee, tea, caffeinated sodas) during the day, and don't have any for at least 4 to 6 hours before bedtime. · Don't drink alcohol before bedtime. Alcohol can cause you to wake up more often during the night. · Don't smoke or use tobacco, especially in the evening. Nicotine can keep you awake. · Don't take naps during the day, especially close to bedtime. · Don't lie in bed awake for too long. If you can't fall asleep, or if you wake up in the middle of the night and can't get back to sleep within 15 minutes or so, get out of bed and go to another room until you feel sleepy. · Don't take medicine right before bed that may keep you awake or make you feel hyper or energized. Your doctor can tell you if your medicine may do this and if you can take it earlier in the day. If you can't sleep  · Imagine yourself in a peaceful, pleasant scene. Focus on the details and feelings of being in a place that is relaxing. · Get up and do a quiet or boring activity until you feel sleepy. · Don't drink any liquids after 6 p.m. if you wake up often because you have to go to the bathroom. Where can you learn more? Go to http://nara-caroline.info/. Enter X397 in the search box to learn more about \"Learning About Sleeping Well. \"  Current as of: December 7, 2017  Content Version: 11.7  © 1132-6073 [employer], [facility].  Care instructions adapted under license by [employer] (which disclaims liability or warranty for this information). If you have questions about a medical condition or this instruction, always ask your healthcare professional. [address] any warranty or liability for your use of this information. Learning About High Blood Pressure  What is high blood pressure? Blood pressure is a measure of how hard the blood pushes against the walls of your arteries. It's normal for blood pressure to go up and down throughout the day, but if it stays up, you have high blood pressure. Another name for high blood pressure is hypertension. Two numbers tell you your blood pressure. The first number is the systolic pressure. It shows how hard the blood pushes when your heart is pumping. The second number is the diastolic pressure. It shows how hard the blood pushes between heartbeats, when your heart is relaxed and filling with blood. A blood pressure of less than 120/80 (say \"120 over 80\") is ideal for an adult. High blood pressure is 130/80 or higher. You have high blood pressure if your top number is 130 or higher or your bottom number is 80 or higher, or both. What happens when you have high blood pressure? · Blood flows through your arteries with too much force. Over time, this damages the walls of your arteries. But you can't feel it. High blood pressure usually doesn't cause symptoms. · Fat and calcium start to build up in your arteries. This buildup is called plaque. Plaque makes your arteries narrower and stiffer. Blood can't flow through them as easily. · This lack of good blood flow starts to damage some of the organs in your body. This can lead to problems such as coronary artery disease and heart attack, heart failure, stroke, kidney failure, and eye damage. How can you prevent high blood pressure? · Stay at a healthy weight.   · Try to limit how much sodium you eat to less than 2,300 milligrams (mg) a day. If you limit your sodium to 1,500 mg a day, you can lower your blood pressure even more. ¨ Buy foods that are labeled \"unsalted,\" \"sodium-free,\" or \"low-sodium. \" Foods labeled \"reduced-sodium\" and \"light sodium\" may still have too much sodium. ¨ Flavor your food with garlic, lemon juice, onion, vinegar, herbs, and spices instead of salt. Do not use soy sauce, steak sauce, onion salt, garlic salt, mustard, or ketchup on your food. ¨ Use less salt (or none) when recipes call for it. You can often use half the salt a recipe calls for without losing flavor. · Be physically active. Get at least 30 minutes of exercise on most days of the week. Walking is a good choice. You also may want to do other activities, such as running, swimming, cycling, or playing tennis or team sports. · Limit alcohol to 2 drinks a day for men and 1 drink a day for women. · Eat plenty of fruits, vegetables, and low-fat dairy products. Eat less saturated and total fats. How is high blood pressure treated? · Your doctor will suggest making lifestyle changes. For example, your doctor may ask you to eat healthy foods, quit smoking, lose extra weight, and be more active. · If lifestyle changes don't help enough or your blood pressure is very high, you will have to take medicine every day. Follow-up care is a key part of your treatment and safety. Be sure to make and go to all appointments, and call your doctor if you are having problems. It's also a good idea to know your test results and keep a list of the medicines you take. Where can you learn more? Go to http://nara-caroline.info/. Enter P501 in the search box to learn more about \"Learning About High Blood Pressure. \"  Current as of: May 10, 2017  Content Version: 11.7  © 7316-1046 [employer]. Care instructions adapted under license by [employer] (which disclaims liability or warranty for this information).  If you have questions about a medical condition or this instruction, always ask your healthcare professional. [place] [address] any warranty or liability for your use of this information.

## 2020-10-07 ENCOUNTER — OFFICE VISIT (OUTPATIENT)
Dept: INTERNAL MEDICINE CLINIC | Age: 85
End: 2020-10-07
Payer: MEDICARE

## 2020-10-07 VITALS
DIASTOLIC BLOOD PRESSURE: 68 MMHG | OXYGEN SATURATION: 97 % | BODY MASS INDEX: 24.8 KG/M2 | HEART RATE: 58 BPM | HEIGHT: 67 IN | TEMPERATURE: 97.8 F | WEIGHT: 158 LBS | SYSTOLIC BLOOD PRESSURE: 130 MMHG | RESPIRATION RATE: 20 BRPM

## 2020-10-07 DIAGNOSIS — Z71.89 EDUCATED ABOUT COVID-19 VIRUS INFECTION: ICD-10-CM

## 2020-10-07 DIAGNOSIS — Z00.00 MEDICARE ANNUAL WELLNESS VISIT, SUBSEQUENT: Primary | ICD-10-CM

## 2020-10-07 DIAGNOSIS — I25.10 CORONARY ARTERY DISEASE INVOLVING NATIVE CORONARY ARTERY OF NATIVE HEART WITHOUT ANGINA PECTORIS: ICD-10-CM

## 2020-10-07 DIAGNOSIS — Z71.89 ADVANCED CARE PLANNING/COUNSELING DISCUSSION: ICD-10-CM

## 2020-10-07 DIAGNOSIS — Z13.31 SCREENING FOR DEPRESSION: ICD-10-CM

## 2020-10-07 DIAGNOSIS — N40.1 BPH ASSOCIATED WITH NOCTURIA: ICD-10-CM

## 2020-10-07 DIAGNOSIS — R35.1 BPH ASSOCIATED WITH NOCTURIA: ICD-10-CM

## 2020-10-07 PROCEDURE — G8427 DOCREV CUR MEDS BY ELIG CLIN: HCPCS | Performed by: INTERNAL MEDICINE

## 2020-10-07 PROCEDURE — G8752 SYS BP LESS 140: HCPCS | Performed by: INTERNAL MEDICINE

## 2020-10-07 PROCEDURE — G8754 DIAS BP LESS 90: HCPCS | Performed by: INTERNAL MEDICINE

## 2020-10-07 PROCEDURE — G0439 PPPS, SUBSEQ VISIT: HCPCS | Performed by: INTERNAL MEDICINE

## 2020-10-07 PROCEDURE — G8536 NO DOC ELDER MAL SCRN: HCPCS | Performed by: INTERNAL MEDICINE

## 2020-10-07 PROCEDURE — 1101F PT FALLS ASSESS-DOCD LE1/YR: CPT | Performed by: INTERNAL MEDICINE

## 2020-10-07 PROCEDURE — G8420 CALC BMI NORM PARAMETERS: HCPCS | Performed by: INTERNAL MEDICINE

## 2020-10-07 PROCEDURE — G8510 SCR DEP NEG, NO PLAN REQD: HCPCS | Performed by: INTERNAL MEDICINE

## 2020-10-07 RX ORDER — SILODOSIN 8 MG/1
8 CAPSULE ORAL DAILY
COMMUNITY
Start: 2020-09-14 | End: 2022-03-31

## 2020-10-07 RX ORDER — DICLOFENAC SODIUM 10 MG/G
GEL TOPICAL DAILY
COMMUNITY

## 2020-10-07 RX ORDER — METOPROLOL TARTRATE 25 MG/1
25 TABLET, FILM COATED ORAL 2 TIMES DAILY
COMMUNITY
Start: 2020-09-01 | End: 2021-09-09

## 2020-10-07 RX ORDER — ROSUVASTATIN CALCIUM 5 MG/1
5 TABLET, COATED ORAL DAILY
COMMUNITY
Start: 2020-08-20 | End: 2022-10-27 | Stop reason: SDUPTHER

## 2020-10-07 RX ORDER — MIRABEGRON 25 MG/1
25 TABLET, FILM COATED, EXTENDED RELEASE ORAL DAILY
COMMUNITY
Start: 2020-09-14 | End: 2022-03-31

## 2020-10-07 NOTE — ACP (ADVANCE CARE PLANNING)
Advance Care Planning     Advance Care Planning (ACP) Physician/NP/PA (Provider) Conversation    Date of ACP Conversation: 10/07/20  Persons included in Conversation:  patient  Length of ACP Conversation in minutes: <16 minutes (Non-Billable)    Authorized Decision Maker (if patient is incapable of making informed decisions):   Named in Advance Directive or Healthcare Power of         He has an advanced directive - a copy has been provided & still reflects him wishes. Reviewed DNR/DNI and patient is interested in remaining a DNR, no changes made.          Marimar Culver MD

## 2020-10-07 NOTE — PROGRESS NOTES
Nikki Forrest is a 81 y.o. male who was seen in clinic today (10/7/2020) for a full physical.          Assessment & Plan:     Diagnoses and all orders for this visit:    1. Medicare annual wellness visit, subsequent    2. Advanced care planning/counseling discussion    3. Screening for depression  -     DEPRESSION SCREEN ANNUAL    4. Educated about COVID-19 virus infection    5. Coronary artery disease involving native coronary artery of native heart without angina pectoris- well controlled, asymptomatic. BP is well controlled, lipids are well controlled. Continue: current plan pending review of labs. -     METABOLIC PANEL, COMPREHENSIVE  -     CBC W/O DIFF  -     LIPID PANEL    6. BPH associated with nocturia- stable, not sure if any changes w/ Myrbetriq, discussed stopping and reassessing. Did review can increase to 2 tabs if needed. Follow-up and Dispositions    · Return in about 1 year (around 10/7/2021), or if symptoms worsen or fail to improve. Subjective:   Char Tejada is here today for a full physical.      Annual Wellness Visit- Subsequent Visit    Since last visit: he was a no show to appointment on 8/18/2020. Last seen 8/21/19. End of Life Planning: This was discussed with him today and he has an advanced directive - a copy has been provided. Reviewed DNR/DNI and patient is interested in remaining a DNR, no changes made.       Depression Screen:  3 most recent PHQ Screens 10/7/2020   Little interest or pleasure in doing things Not at all   Feeling down, depressed, irritable, or hopeless Not at all   Total Score PHQ 2 0   Trouble falling or staying asleep, or sleeping too much Nearly every day   Feeling tired or having little energy Several days   Poor appetite, weight loss, or overeating Not at all   Feeling bad about yourself - or that you are a failure or have let yourself or your family down Not at all   Trouble concentrating on things such as school, work, reading, or watching TV Not at all   Moving or speaking so slowly that other people could have noticed; or the opposite being so fidgety that others notice Not at all   Thoughts of being better off dead, or hurting yourself in some way Not at all   PHQ 9 Score 4   How difficult have these problems made it for you to do your work, take care of your home and get along with others Not difficult at all         Fall Risk:   Fall Risk Assessment, last 12 mths 10/7/2020   Able to walk? Yes   Fall in past 12 months? No       Abuse Screen:  Abuse Screening Questionnaire 10/7/2020   Do you ever feel afraid of your partner? N   Are you in a relationship with someone who physically or mentally threatens you? N   Is it safe for you to go home? Y         Do you average more than 1 drink per night or more than 7 drinks a week: No    In the past three months have you have had more than 4 drinks containing alcohol on one occasion: No    Functional Ability and Level of Safety:   Hearing Screen: Hearing is good. Cognition Screen:  Has your family/caregiver stated any concerns about your memory: no    Ambulation: with mild difficulty    Activities of Daily Living:    Exercise: moderately active  The home contains: no safety equipment. Patient does total self care    Adult Nutrition Screen:  No risk factors noted. Health Maintenance:   Daily Aspirin: yes  AAA Screening: n/a: aged out  Glaucoma Screening: UTD    Immunizations:    Influenza: up to date   Tetanus: up to date   Shingles: up to date   Pneumonia: up to date  Cancer screening:   Prostate: guidelines reviewed, n/a  Colon: guidelines reviewed, up to date        In addition to the above issues we also reviewed the following acute and/or chronic problems:    Cardiovascular Review  The patient has coronary artery disease. He is now off Brilinta. He reports taking medications as instructed, no medication side effects noted, patient does not perform home BP monitoring.   Diet and Lifestyle: generally follows a low fat low cholesterol diet, generally follows a low sodium diet, exercises sporadically. Labs: reviewed and discussed with patient. Patient Care Team:  Gilbert Barrientos MD as PCP - General (Internal Medicine)  Gilbert Barrientos MD as PCP - [facility] Empaneled Provider  Ralf Buckley MD (Urology)  Reta Lau MD (Dermatology)  Radha Coleman MD as Physician (Ophthalmology)  Cristina Cortez MD as Physician (Orthopedic Surgery)  Gilbert Barrientos MD (Internal Medicine)  Joselyn Curiel MD (Cardiology)       The following sections were reviewed & updated as appropriate: PMH, PSH, FH, and SH. 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 Authorizing Provider   rosuvastatin (CRESTOR) 5 mg tablet Take 5 mg by mouth daily. 8/20/20  Yes Provider, Historical   Myrbetriq 25 mg ER tablet Take 25 mg by mouth daily. 9/14/20  Yes Provider, Historical   metoprolol tartrate (LOPRESSOR) 25 mg tablet Take 25 mg by mouth two (2) times a day. 9/1/20  Yes Provider, Historical   silodosin (RAPAFLO) 8 mg capsule Take 8 mg by mouth daily. 9/14/20  Yes Provider, Historical   diclofenac (Voltaren) 1 % gel Apply  to affected area daily. Yes Provider, Historical   felodipine (PLENDIL SR) 10 mg 24 hr tablet Take 1 tablet by mouth  daily 8/21/19  Yes Gilbert Barrientos MD   aspirin 81 mg chewable tablet Take 1 Tab by mouth daily. 4/25/19  Yes Joselyn uCriel MD   MELATONIN PO Take 3 mg by mouth nightly as needed. Yes Provider, Historical   finasteride (PROSCAR) 5 mg tablet Take 5 mg by mouth daily.  8/15/15  Yes Provider, Historical multivitamins-minerals-lutein (CENTRUM SILVER) Tab Take 1 Tab by mouth two (2) times a week. Yes Provider, Historical   ticagrelor (BRILINTA) 90 mg tablet Take 1 Tab by mouth two (2) times a day. 4/24/19   Latoya Villagomez MD   atorvastatin (LIPITOR) 10 mg tablet Take 1 Tab by mouth nightly. 4/24/19 10/7/20  Laura Archuleta MD   metoprolol succinate (TOPROL-XL) 25 mg XL tablet Take 1 Tab by mouth daily. 4/25/19 10/7/20  Laura Archuleta MD   silodosin (RAPAFLO PO) Take  by mouth daily. 10/7/20  Provider, Historical          No Known Allergies           Review of Systems   Constitutional: Negative for chills and fever. Respiratory: Negative for cough and shortness of breath. Cardiovascular: Negative for chest pain and palpitations. Gastrointestinal: Negative for abdominal pain, blood in stool, constipation, diarrhea, heartburn, nausea and vomiting. Genitourinary:        He reports: nocturia x 4-5, urinary hesitancy, weak stream.  Urologist started him on Myrbetriq. He is not sure it is helping   Musculoskeletal: Positive for myalgias (R upper leg, told it was due to a joint by ortho, using Rx cream w/ good relief). Negative for joint pain. Neurological: Negative for tingling, focal weakness and headaches. Endo/Heme/Allergies: Does not bruise/bleed easily. Psychiatric/Behavioral: Negative for depression. The patient is not nervous/anxious and does not have insomnia. Objective:   Physical Exam  Constitutional:       General: He is not in acute distress. Appearance: Normal appearance. Eyes:      Conjunctiva/sclera: Conjunctivae normal.   Neck:      Thyroid: No thyromegaly. Cardiovascular:      Rate and Rhythm: Regular rhythm. Bradycardia present. Heart sounds: No murmur. Pulmonary:      Effort: Pulmonary effort is normal.      Breath sounds: Normal breath sounds. No decreased breath sounds or wheezing. Abdominal:      General: Bowel sounds are normal.      Palpations: Abdomen is soft. Tenderness: There is no abdominal tenderness. Musculoskeletal:      Right lower leg: No edema. Left lower leg: No edema. Psychiatric:         Mood and Affect: Mood and affect normal.            Visit Vitals  /68   Pulse (!) 58   Temp 97.8 °F (36.6 °C) (Temporal)   Resp 20   Ht 5' 7.2\" (1.707 m)   Wt 158 lb (71.7 kg)   SpO2 97%   BMI 24.60 kg/m²          Advised him to call back or return to office if symptoms worsen/change/persist.  Discussed expected course/resolution/complications of diagnosis in detail with patient. Medication risks/benefits/costs/interactions/alternatives discussed with patient. He was given an after visit summary which includes diagnoses, current medications, & vitals. He expressed understanding with the diagnosis and plan. Aspects of this note may have been generated using voice recognition software. Despite editing, there may be some syntax errors.        Jakob Samuel MD

## 2020-10-07 NOTE — PATIENT INSTRUCTIONS

## 2020-10-20 DIAGNOSIS — E87.5 HYPERKALEMIA: Primary | ICD-10-CM

## 2020-10-20 LAB
ALBUMIN SERPL-MCNC: 4.4 G/DL (ref 3.6–4.6)
ALBUMIN/GLOB SERPL: 2.6 {RATIO} (ref 1.2–2.2)
ALP SERPL-CCNC: 67 IU/L (ref 39–117)
ALT SERPL-CCNC: 25 IU/L (ref 0–44)
AST SERPL-CCNC: 23 IU/L (ref 0–40)
BILIRUB SERPL-MCNC: 0.6 MG/DL (ref 0–1.2)
BUN SERPL-MCNC: 14 MG/DL (ref 8–27)
BUN/CREAT SERPL: 14 (ref 10–24)
CALCIUM SERPL-MCNC: 9.5 MG/DL (ref 8.6–10.2)
CHLORIDE SERPL-SCNC: 106 MMOL/L (ref 96–106)
CHOLEST SERPL-MCNC: 157 MG/DL (ref 100–199)
CO2 SERPL-SCNC: 26 MMOL/L (ref 20–29)
CREAT SERPL-MCNC: 1.01 MG/DL (ref 0.76–1.27)
ERYTHROCYTE [DISTWIDTH] IN BLOOD BY AUTOMATED COUNT: 12.3 % (ref 11.6–15.4)
GLOBULIN SER CALC-MCNC: 1.7 G/DL (ref 1.5–4.5)
GLUCOSE SERPL-MCNC: 96 MG/DL (ref 65–99)
HCT VFR BLD AUTO: 48.7 % (ref 37.5–51)
HDLC SERPL-MCNC: 84 MG/DL
HGB BLD-MCNC: 15.9 G/DL (ref 13–17.7)
LDLC SERPL CALC-MCNC: 58 MG/DL (ref 0–99)
MCH RBC QN AUTO: 33 PG (ref 26.6–33)
MCHC RBC AUTO-ENTMCNC: 32.6 G/DL (ref 31.5–35.7)
MCV RBC AUTO: 101 FL (ref 79–97)
MORPHOLOGY BLD-IMP: ABNORMAL
PLATELET # BLD AUTO: 245 X10E3/UL (ref 150–450)
POTASSIUM SERPL-SCNC: 5.8 MMOL/L (ref 3.5–5.2)
PROT SERPL-MCNC: 6.1 G/DL (ref 6–8.5)
RBC # BLD AUTO: 4.82 X10E6/UL (ref 4.14–5.8)
SODIUM SERPL-SCNC: 142 MMOL/L (ref 134–144)
TRIGL SERPL-MCNC: 77 MG/DL (ref 0–149)
VLDLC SERPL CALC-MCNC: 15 MG/DL (ref 5–40)
WBC # BLD AUTO: 5.9 X10E3/UL (ref 3.4–10.8)

## 2020-10-20 NOTE — PROGRESS NOTES
Please call patient. All labs are stable or at goal except for elevated K. Has a h/o fluctuating K's in the past but this is highest.  Needs to be repeated. Order placed.

## 2020-10-26 NOTE — PROGRESS NOTES
Call to patient, identified with 2 identifiers. Advised of Dr. Laurita Silverio' note and recommendations. Requested I mail lab slip, he will call [facility] to schedule appt.

## 2020-10-28 ENCOUNTER — TELEPHONE (OUTPATIENT)
Dept: INTERNAL MEDICINE CLINIC | Age: 85
End: 2020-10-28

## 2020-10-28 NOTE — TELEPHONE ENCOUNTER
Call to patient, identified with 2 identifiers. Hasn't received lab slip, told him we spoke late on Friday, didn't go out until Monday. He should probably receive tomorrow.

## 2020-10-28 NOTE — TELEPHONE ENCOUNTER
Felix Antoine ([facility]) [phone] (H)     Pt requesting a call back regarding his labs, has some questions and needs more information. No

## 2020-10-31 LAB
BUN SERPL-MCNC: 14 MG/DL (ref 8–27)
BUN/CREAT SERPL: 16 (ref 10–24)
CALCIUM SERPL-MCNC: 8.8 MG/DL (ref 8.6–10.2)
CHLORIDE SERPL-SCNC: 108 MMOL/L (ref 96–106)
CO2 SERPL-SCNC: 25 MMOL/L (ref 20–29)
CREAT SERPL-MCNC: 0.85 MG/DL (ref 0.76–1.27)
GLUCOSE SERPL-MCNC: 100 MG/DL (ref 65–99)
POTASSIUM SERPL-SCNC: 4.4 MMOL/L (ref 3.5–5.2)
SODIUM SERPL-SCNC: 142 MMOL/L (ref 134–144)

## 2020-11-02 NOTE — PROGRESS NOTES
Call to patient, identified with 2 identifiers. Advised of Dr. Sonia Heart' note. Patient verbalized understanding.

## 2020-11-19 DIAGNOSIS — I25.10 CORONARY ARTERY DISEASE INVOLVING NATIVE CORONARY ARTERY OF NATIVE HEART WITHOUT ANGINA PECTORIS: ICD-10-CM

## 2020-11-19 NOTE — TELEPHONE ENCOUNTER
Requested Prescriptions     Pending Prescriptions Disp Refills    felodipine (PLENDIL SR) 10 mg 24 hr tablet 90 Tab 3     Sig: Take 1 tablet by mouth  daily

## 2020-11-20 RX ORDER — FELODIPINE 10 MG/1
TABLET, EXTENDED RELEASE ORAL
Qty: 90 TAB | Refills: 3 | Status: SHIPPED | OUTPATIENT
Start: 2020-11-20 | End: 2022-02-17

## 2021-02-04 ENCOUNTER — TELEPHONE (OUTPATIENT)
Dept: INTERNAL MEDICINE CLINIC | Age: 86
End: 2021-02-04

## 2021-02-04 NOTE — TELEPHONE ENCOUNTER
Spoke with patient and he did not want to discuss covid. He wanted to make an appointment to discuss his memory and possible memory loss. Made patient and appointment to come in with Dr. Momo Floyd.

## 2021-02-23 ENCOUNTER — OFFICE VISIT (OUTPATIENT)
Dept: INTERNAL MEDICINE CLINIC | Age: 86
End: 2021-02-23
Payer: MEDICARE

## 2021-02-23 VITALS
SYSTOLIC BLOOD PRESSURE: 153 MMHG | RESPIRATION RATE: 16 BRPM | DIASTOLIC BLOOD PRESSURE: 76 MMHG | OXYGEN SATURATION: 97 % | WEIGHT: 166 LBS | HEART RATE: 48 BPM | TEMPERATURE: 97.4 F | BODY MASS INDEX: 26.06 KG/M2 | HEIGHT: 67 IN

## 2021-02-23 DIAGNOSIS — I10 ESSENTIAL HYPERTENSION: ICD-10-CM

## 2021-02-23 DIAGNOSIS — R41.3 MEMORY LOSS: Primary | ICD-10-CM

## 2021-02-23 PROCEDURE — G8536 NO DOC ELDER MAL SCRN: HCPCS | Performed by: INTERNAL MEDICINE

## 2021-02-23 PROCEDURE — G8753 SYS BP > OR = 140: HCPCS | Performed by: INTERNAL MEDICINE

## 2021-02-23 PROCEDURE — G8754 DIAS BP LESS 90: HCPCS | Performed by: INTERNAL MEDICINE

## 2021-02-23 PROCEDURE — 99213 OFFICE O/P EST LOW 20 MIN: CPT | Performed by: INTERNAL MEDICINE

## 2021-02-23 PROCEDURE — G8427 DOCREV CUR MEDS BY ELIG CLIN: HCPCS | Performed by: INTERNAL MEDICINE

## 2021-02-23 PROCEDURE — G8432 DEP SCR NOT DOC, RNG: HCPCS | Performed by: INTERNAL MEDICINE

## 2021-02-23 PROCEDURE — G8419 CALC BMI OUT NRM PARAM NOF/U: HCPCS | Performed by: INTERNAL MEDICINE

## 2021-02-23 PROCEDURE — 1101F PT FALLS ASSESS-DOCD LE1/YR: CPT | Performed by: INTERNAL MEDICINE

## 2021-02-23 NOTE — PROGRESS NOTES
Joe Thompson is a 81 y.o. male who was seen in clinic today (2/23/2021). Assessment & Plan:   Diagnoses and all orders for this visit:    1. Memory loss- this is a new problem, symptoms are: intermittent, differential dx reviewed with the patient, exact etiology is unclear at this time. Since started after STEMI will rule out any CVA. Did review could be statin related but would not be classic so will defer changing this at this time. Will defer labs. Will get official memory testing set up. Reviewed limiting driving at this time. Red flags and expectations were reviewed & discussed with the him. He verbalized understanding.    -     MRI BRAIN WO CONT; Future  -     REFERRAL TO PSYCHOLOGY    2. Essential hypertension- previously well controlled, high today, encouraged to check at home. Follow up - TBD, based on work up and moving into long term (they are looking into [facility] [place])      Subjective/Objective:   Rey Loomis was seen today for Memory Loss      Since last visit: he had an episode of memory loss. A few months ago he was playing a card game with the family. This is a game he has played multiple times in the past, but could not remember how to play. He has had to \"relearn\" the game since then. This was disturbing to him. He has noticed some other memories are more \"hazy\". Wife things this has been more of an issue since his last heart attack (STEMI on 4/23/19). She is asking if it is statin related. She reports driving is a problem when not in familiar locations. Prior to Admission medications    Medication Sig Start Date End Date Taking? Authorizing Provider   felodipine (PLENDIL SR) 10 mg 24 hr tablet Take 1 tablet by mouth  daily 11/20/20  Yes George Chin MD   rosuvastatin (CRESTOR) 5 mg tablet Take 5 mg by mouth daily. 8/20/20  Yes Provider, Historical   Myrbetriq 25 mg ER tablet Take 25 mg by mouth daily.  9/14/20  Yes Provider, Historical   metoprolol tartrate (LOPRESSOR) 25 mg tablet Take 25 mg by mouth two (2) times a day. 9/1/20  Yes Provider, Historical   silodosin (RAPAFLO) 8 mg capsule Take 8 mg by mouth daily. 9/14/20  Yes Provider, Historical   diclofenac (Voltaren) 1 % gel Apply  to affected area daily. Yes Provider, Historical   ticagrelor (BRILINTA) 90 mg tablet Take 1 Tab by mouth two (2) times a day. 4/24/19  Yes Hayden Martinez MD   MELATONIN PO Take 3 mg by mouth nightly as needed. Yes Provider, Historical   finasteride (PROSCAR) 5 mg tablet Take 5 mg by mouth daily. 8/15/15  Yes Provider, Historical   multivitamins-minerals-lutein (CENTRUM SILVER) Tab Take 1 Tab by mouth two (2) times a week. Yes Provider, Historical   aspirin 81 mg chewable tablet Take 1 Tab by mouth daily. 4/25/19   Hayden Martinez MD        Review of Systems   Neurological: Negative for dizziness and headaches. Psychiatric/Behavioral: Positive for memory loss. Negative for depression. The patient is not nervous/anxious and does not have insomnia. Physical Exam  Cardiovascular:      Rate and Rhythm: Regular rhythm. Bradycardia present. Heart sounds: No murmur. Pulmonary:      Effort: Pulmonary effort is normal.      Breath sounds: Normal breath sounds. Neurological:      Cranial Nerves: Cranial nerves are intact. Sensory: Sensation is intact. Motor: Motor function is intact.          Visit Vitals  BP (!) 153/76   Pulse (!) 48   Temp 97.4 °F (36.3 °C) (Temporal)   Resp 16   Ht 5' 7.2\" (1.707 m)   Wt 166 lb (75.3 kg)   SpO2 97%   BMI 25.84 kg/m²         Tay Berrios MD

## 2021-02-24 ENCOUNTER — TELEPHONE (OUTPATIENT)
Dept: INTERNAL MEDICINE CLINIC | Age: 86
End: 2021-02-24

## 2021-02-26 ENCOUNTER — TELEPHONE (OUTPATIENT)
Dept: INTERNAL MEDICINE CLINIC | Age: 86
End: 2021-02-26

## 2021-02-26 DIAGNOSIS — I10 ESSENTIAL HYPERTENSION: Primary | ICD-10-CM

## 2021-02-26 RX ORDER — HYDROCHLOROTHIAZIDE 12.5 MG/1
12.5 TABLET ORAL DAILY
Qty: 30 TAB | Refills: 1 | Status: SHIPPED | OUTPATIENT
Start: 2021-02-26 | End: 2021-03-26 | Stop reason: SDUPTHER

## 2021-02-26 NOTE — TELEPHONE ENCOUNTER
Yes, give him Dr Saturnino Mccnan information. Inquire what his BP is running. He is on max dose of metoprolol due to HR. Would need to add in 2nd medication if SBP is constantly > 150.

## 2021-02-26 NOTE — TELEPHONE ENCOUNTER
CT patient, advised low dose HCTZ sent to pharmacy and that records sent to [facility] with confirmation.

## 2021-02-26 NOTE — TELEPHONE ENCOUNTER
RC to patient and wife. Advised of Dr. Justin Tuttle' note and recommendations. Gave them information regarding [facility], Dr. Jacqui Spangler group. Will fax over information. Patient's BP last 3 days has been 174/81, 168/84, 171/83. Told her I would give information to Dr. Justin Tuttle' and let them know how he wants to proceed. If medication is sent in they want it sent to [facility] at the [facility].

## 2021-03-09 ENCOUNTER — HOSPITAL ENCOUNTER (OUTPATIENT)
Dept: MRI IMAGING | Age: 86
Discharge: HOME | End: 2021-03-09
Attending: INTERNAL MEDICINE
Payer: MEDICARE

## 2021-03-09 DIAGNOSIS — R41.3 MEMORY LOSS: ICD-10-CM

## 2021-03-09 PROCEDURE — 70551 MRI BRAIN STEM W/O DYE: CPT

## 2021-03-10 NOTE — PROGRESS NOTES
Results reviewed. Please call patient and notify him that his MRI is normal for his age. Nothing to explain the memory loss. There are changes consistent with his age and h/o HTN.

## 2021-03-11 ENCOUNTER — TELEPHONE (OUTPATIENT)
Dept: INTERNAL MEDICINE CLINIC | Age: 86
End: 2021-03-11

## 2021-03-11 NOTE — TELEPHONE ENCOUNTER
----- Message from Morteza Rothman MD sent at 3/10/2021 11:51 AM EST -----  Results reviewed. Please call patient and notify him that his MRI is normal for his age. Nothing to explain the memory loss. There are changes consistent with his age and h/o HTN.

## 2021-03-26 DIAGNOSIS — I10 ESSENTIAL HYPERTENSION: ICD-10-CM

## 2021-03-26 RX ORDER — HYDROCHLOROTHIAZIDE 12.5 MG/1
12.5 TABLET ORAL DAILY
Qty: 90 TAB | Refills: 1 | Status: SHIPPED | OUTPATIENT
Start: 2021-03-26 | End: 2021-11-14

## 2021-03-26 NOTE — TELEPHONE ENCOUNTER
Requested Prescriptions     Pending Prescriptions Disp Refills    hydroCHLOROthiazide (HYDRODIURIL) 12.5 mg tablet 30 Tab 1     Sig: Take 1 Tab by mouth daily.                  [address], [address] [place] [address]

## 2021-03-30 DIAGNOSIS — I10 ESSENTIAL HYPERTENSION: ICD-10-CM

## 2021-03-30 RX ORDER — HYDROCHLOROTHIAZIDE 12.5 MG/1
12.5 TABLET ORAL DAILY
Qty: 90 TAB | Refills: 1 | Status: CANCELLED | OUTPATIENT
Start: 2021-03-30

## 2021-04-27 DIAGNOSIS — I10 ESSENTIAL HYPERTENSION: ICD-10-CM

## 2021-04-27 RX ORDER — HYDROCHLOROTHIAZIDE 12.5 MG/1
TABLET ORAL
Qty: 30 TAB | Refills: 1 | OUTPATIENT
Start: 2021-04-27

## 2021-04-30 ENCOUNTER — TRANSCRIBE ORDER (OUTPATIENT)
Dept: SCHEDULING | Age: 86
End: 2021-04-30

## 2021-04-30 DIAGNOSIS — M54.9 BACK PAIN: Primary | ICD-10-CM

## 2021-05-06 ENCOUNTER — HOSPITAL ENCOUNTER (OUTPATIENT)
Dept: MRI IMAGING | Age: 86
Discharge: HOME | End: 2021-05-06
Attending: ORTHOPAEDIC SURGERY
Payer: MEDICARE

## 2021-05-06 DIAGNOSIS — M54.9 BACK PAIN: ICD-10-CM

## 2021-05-06 PROCEDURE — 72148 MRI LUMBAR SPINE W/O DYE: CPT

## 2021-05-26 ENCOUNTER — OFFICE VISIT (OUTPATIENT)
Dept: INTERNAL MEDICINE CLINIC | Age: 86
End: 2021-05-26
Payer: MEDICARE

## 2021-05-26 VITALS
SYSTOLIC BLOOD PRESSURE: 122 MMHG | WEIGHT: 159 LBS | OXYGEN SATURATION: 98 % | RESPIRATION RATE: 20 BRPM | HEIGHT: 67 IN | HEART RATE: 61 BPM | DIASTOLIC BLOOD PRESSURE: 69 MMHG | BODY MASS INDEX: 24.96 KG/M2 | TEMPERATURE: 97.8 F

## 2021-05-26 DIAGNOSIS — R41.3 MEMORY LOSS: ICD-10-CM

## 2021-05-26 DIAGNOSIS — R35.1 BPH ASSOCIATED WITH NOCTURIA: ICD-10-CM

## 2021-05-26 DIAGNOSIS — I10 ESSENTIAL HYPERTENSION: ICD-10-CM

## 2021-05-26 DIAGNOSIS — Z02.89 ENCOUNTER FOR COMPLETION OF FORM WITH PATIENT: ICD-10-CM

## 2021-05-26 DIAGNOSIS — N40.1 BPH ASSOCIATED WITH NOCTURIA: ICD-10-CM

## 2021-05-26 DIAGNOSIS — Z01.84 IMMUNITY STATUS TESTING: ICD-10-CM

## 2021-05-26 DIAGNOSIS — I25.10 CORONARY ARTERY DISEASE INVOLVING NATIVE CORONARY ARTERY OF NATIVE HEART WITHOUT ANGINA PECTORIS: Primary | ICD-10-CM

## 2021-05-26 PROCEDURE — 99214 OFFICE O/P EST MOD 30 MIN: CPT | Performed by: INTERNAL MEDICINE

## 2021-05-26 PROCEDURE — G8510 SCR DEP NEG, NO PLAN REQD: HCPCS | Performed by: INTERNAL MEDICINE

## 2021-05-26 PROCEDURE — 1101F PT FALLS ASSESS-DOCD LE1/YR: CPT | Performed by: INTERNAL MEDICINE

## 2021-05-26 PROCEDURE — G8752 SYS BP LESS 140: HCPCS | Performed by: INTERNAL MEDICINE

## 2021-05-26 PROCEDURE — G8754 DIAS BP LESS 90: HCPCS | Performed by: INTERNAL MEDICINE

## 2021-05-26 PROCEDURE — G8420 CALC BMI NORM PARAMETERS: HCPCS | Performed by: INTERNAL MEDICINE

## 2021-05-26 PROCEDURE — G8427 DOCREV CUR MEDS BY ELIG CLIN: HCPCS | Performed by: INTERNAL MEDICINE

## 2021-05-26 PROCEDURE — G8536 NO DOC ELDER MAL SCRN: HCPCS | Performed by: INTERNAL MEDICINE

## 2021-05-26 NOTE — ASSESSMENT & PLAN NOTE
Stable, minimally symptomatic, has testing tomorrow, he will schedule f/u with me in 1 month to review results. Briefly discussed medication options if it is abnormal.  Wife had a lot of questions regarding roll of medication causing memory loss.   No med changes today

## 2021-05-26 NOTE — ASSESSMENT & PLAN NOTE
well controlled, asymptomatic. BP is well controlled, lipids are well controlled. Continue: current plan pending review of labs.

## 2021-05-26 NOTE — PROGRESS NOTES
De Brantley is a 81 y.o. male who was seen in clinic today (5/26/2021). Assessment & Plan:   Below is the assessment and plan developed based on review of pertinent history, physical exam, labs, studies, and medications. 1. Coronary artery disease involving native coronary artery of native heart without angina pectoris  Assessment & Plan:  well controlled, asymptomatic. BP is well controlled, lipids are well controlled. Continue: current plan pending review of labs. Orders:  -     METABOLIC PANEL, COMPREHENSIVE; Future  2. Essential hypertension  Assessment & Plan:  at goal, continue current treatment pending review of labs    Orders:  -     METABOLIC PANEL, COMPREHENSIVE; Future  3. Memory loss  Assessment & Plan:  Stable, minimally symptomatic, has testing tomorrow, he will schedule f/u with me in 1 month to review results. Briefly discussed medication options if it is abnormal.  Wife had a lot of questions regarding roll of medication causing memory loss. No med changes today   4. BPH associated with nocturia  Assessment & Plan:   monitored by specialist. No acute findings meriting change in the plan. No changes  5. Immunity status testing  -     QUANTIFERON-TB PLUS(CLIENT INCUB.); Future  6. Encounter for completion of form with patient  Comments:  form reviewed with him and will be completed later today    Medication list reviewed in detail with both of them. Agreed some medications can effect memory but I would not recommend making any changes. Follow-up and Dispositions    · Return in about 6 months (around 11/26/2021) for FULL PHYSICAL - 30 minutes. Subjective/Objective:   Beba Diehl was seen today for Form Completion and Coronary Artery Disease      Since last visit: has decided to move into [facility], on 6/24). Last seen on 10/7/20 for a Medicare Wellness Visit and on 2/23/21 for concerns about memory changes. He has neuropsych testing with Dr Lalit Cooper tomorrow.   He is concerned about the number of medications he is taking. His med list was reviewed in detail with him. The patient has hypertension and coronary artery disease. He reports taking medications as instructed, no medication side effects noted, patient does not perform home BP monitoring. He generally follows a low fat low cholesterol diet, generally follows a low sodium diet, exercises regularly. Prior to Admission medications    Medication Sig Start Date End Date Taking? Authorizing Provider   COQ10, UBIQUINOL, PO Take  by mouth daily. Yes Provider, Historical   hydroCHLOROthiazide (HYDRODIURIL) 12.5 mg tablet Take 1 Tab by mouth daily. 3/26/21  Yes Mell Godfrey MD   felodipine (PLENDIL SR) 10 mg 24 hr tablet Take 1 tablet by mouth  daily 11/20/20  Yes Mell Godfrey MD   rosuvastatin (CRESTOR) 5 mg tablet Take 5 mg by mouth daily. 8/20/20  Yes Provider, Historical   Myrbetriq 25 mg ER tablet Take 25 mg by mouth daily. 9/14/20  Yes Provider, Historical   metoprolol tartrate (LOPRESSOR) 25 mg tablet Take 25 mg by mouth two (2) times a day. 9/1/20  Yes Provider, Historical   silodosin (RAPAFLO) 8 mg capsule Take 8 mg by mouth daily. 9/14/20  Yes Provider, Historical   diclofenac (Voltaren) 1 % gel Apply  to affected area daily. Yes Provider, Historical   aspirin 81 mg chewable tablet Take 1 Tab by mouth daily. 4/25/19  Yes Eamon Sommers MD   MELATONIN PO Take 3 mg by mouth nightly as needed. Yes Provider, Historical   finasteride (PROSCAR) 5 mg tablet Take 5 mg by mouth daily. 8/15/15  Yes Provider, Historical   multivitamins-minerals-lutein (CENTRUM SILVER) Tab Take 1 Tab by mouth two (2) times a week. Yes Provider, Historical   ticagrelor (BRILINTA) 90 mg tablet Take 1 Tab by mouth two (2) times a day. 4/24/19 5/26/21  Eamon Sommers MD        Review of Systems   Psychiatric/Behavioral: Positive for memory loss. All other systems reviewed and are negative.        Physical Exam  Constitutional:       General: He is not in acute distress. Appearance: Normal appearance. Eyes:      Conjunctiva/sclera: Conjunctivae normal.   Cardiovascular:      Rate and Rhythm: Regular rhythm. Heart sounds: No murmur heard. Pulmonary:      Effort: Pulmonary effort is normal.      Breath sounds: Normal breath sounds. No decreased breath sounds or wheezing. Abdominal:      General: Bowel sounds are normal.      Palpations: Abdomen is soft. Tenderness: There is no abdominal tenderness. Musculoskeletal:      Right lower leg: No edema. Left lower leg: No edema.    Psychiatric:         Mood and Affect: Mood and affect normal.         Behavior: Behavior normal.         Visit Vitals  /69   Pulse 61   Temp 97.8 °F (36.6 °C) (Temporal)   Resp 20   Ht 5' 7\" (1.702 m)   Wt 159 lb (72.1 kg)   SpO2 98%   BMI 24.90 kg/m²         Verónica Gomez MD

## 2021-06-15 ENCOUNTER — TELEPHONE (OUTPATIENT)
Dept: INTERNAL MEDICINE CLINIC | Age: 86
End: 2021-06-15

## 2021-06-15 NOTE — TELEPHONE ENCOUNTER
I have not seen results yet (maybe in my mail if came thru in the last 4-5 days). Please call Dr Griselda Diego office to get ETA for note.

## 2021-06-15 NOTE — TELEPHONE ENCOUNTER
Patient's wife would like to know if Dr. Nancy Pettit has heard from Dr. Fidencio Haskins about her 's test results. Also wants to know if Dr. Nancy Pettit will be giving him a medication to help with his memory loss.

## 2021-06-18 ENCOUNTER — TELEPHONE (OUTPATIENT)
Dept: INTERNAL MEDICINE CLINIC | Age: 86
End: 2021-06-18

## 2021-06-18 NOTE — TELEPHONE ENCOUNTER
[place] Shira Gleason called concerning about her  Mr. Luke Nevarez.     Saw the cardiologist yesterday and would like to discuss what medications he should take    Pls give them a call    [phone]

## 2021-06-18 NOTE — TELEPHONE ENCOUNTER
Notes reviewed, yes would need to schedule visit, can be virtual, if interested in starting medications.   New dx of MCI (mild cognitive impairment)

## 2021-06-18 NOTE — TELEPHONE ENCOUNTER
Attempted to reach patient via phone, unsucessful. Left message to return call. Called cardiologist office, they said he hasn't been seen since December. Wanted to check if he needs to schedule appointment for neuropsych testing review.

## 2021-06-21 NOTE — TELEPHONE ENCOUNTER
RC from patient's wife, on HIPAA. Wants to schedule appointment to review options of neuropsych testing. Wants to come into the office. Scheduled for Monday, June 28th. They are in the process of moving to [facility] but will be done this week.

## 2021-06-28 ENCOUNTER — OFFICE VISIT (OUTPATIENT)
Dept: INTERNAL MEDICINE CLINIC | Age: 86
End: 2021-06-28
Payer: MEDICARE

## 2021-06-28 VITALS
BODY MASS INDEX: 24.33 KG/M2 | DIASTOLIC BLOOD PRESSURE: 78 MMHG | RESPIRATION RATE: 18 BRPM | SYSTOLIC BLOOD PRESSURE: 138 MMHG | WEIGHT: 155 LBS | HEART RATE: 51 BPM | HEIGHT: 67 IN | OXYGEN SATURATION: 97 % | TEMPERATURE: 97.5 F

## 2021-06-28 DIAGNOSIS — I25.2 HISTORY OF ST ELEVATION MYOCARDIAL INFARCTION (STEMI): ICD-10-CM

## 2021-06-28 DIAGNOSIS — G31.84 MCI (MILD COGNITIVE IMPAIRMENT) WITH MEMORY LOSS: Primary | ICD-10-CM

## 2021-06-28 PROCEDURE — 99213 OFFICE O/P EST LOW 20 MIN: CPT | Performed by: INTERNAL MEDICINE

## 2021-06-28 RX ORDER — DONEPEZIL HYDROCHLORIDE 5 MG/1
5 TABLET, FILM COATED ORAL
Qty: 90 TABLET | Refills: 0 | Status: SHIPPED | OUTPATIENT
Start: 2021-06-28 | End: 2021-08-23

## 2021-06-28 NOTE — PROGRESS NOTES
Brandyn Headley is a 81 y.o. male who was seen in clinic today (6/28/2021). He RTC with his wife. Assessment & Plan:   Below is the assessment and plan developed based on review of pertinent history, physical exam, labs, studies, and medications. 1. MCI (mild cognitive impairment) with memory loss  Assessment & Plan:  New dx, reviewed official testing, reviewed tx options and expectations in depth with him and wife. Will start on Aricept 5mg daily. If in 3 months doing well, will increase to 10mg. Orders:  -     donepeziL (ARICEPT) 5 mg tablet; Take 1 Tablet by mouth nightly., Normal, Disp-90 Tablet, R-0    Follow-up and Dispositions    · Return if symptoms worsen or fail to improve. Subjective/Objective:   Debbie Guillen was seen today for Results      Since last visit: completed neuropsych testing. They RTC to review the results and discuss options. Wife is asking about if rosuvastatin could be related. Wife reports family have noticed memory changes. Not all friends have. Pt reports he also has noticed some changes. Prior to Admission medications    Medication Sig Start Date End Date Taking? Authorizing Provider   COQ10, UBIQUINOL, PO Take  by mouth daily. Yes Provider, Historical   hydroCHLOROthiazide (HYDRODIURIL) 12.5 mg tablet Take 1 Tab by mouth daily. 3/26/21  Yes Graham Nevarez MD   felodipine (PLENDIL SR) 10 mg 24 hr tablet Take 1 tablet by mouth  daily 11/20/20  Yes Graham Nevarez MD   rosuvastatin (CRESTOR) 5 mg tablet Take 5 mg by mouth daily. 8/20/20  Yes Provider, Historical   Myrbetriq 25 mg ER tablet Take 25 mg by mouth daily. 9/14/20  Yes Provider, Historical   metoprolol tartrate (LOPRESSOR) 25 mg tablet Take 25 mg by mouth two (2) times a day. 9/1/20  Yes Provider, Historical   silodosin (RAPAFLO) 8 mg capsule Take 8 mg by mouth daily. 9/14/20  Yes Provider, Historical   aspirin 81 mg chewable tablet Take 1 Tab by mouth daily.  4/25/19  Yes Rocio Roque MD   MELATONIN PO Take 3 mg by mouth nightly as needed. Yes Provider, Historical   finasteride (PROSCAR) 5 mg tablet Take 5 mg by mouth daily. 8/15/15  Yes Provider, Historical   multivitamins-minerals-lutein (CENTRUM SILVER) Tab Take 1 Tab by mouth two (2) times a week. Yes Provider, Historical   diclofenac (Voltaren) 1 % gel Apply  to affected area daily.   Patient not taking: Reported on 6/28/2021    Provider, Historical          Visit Vitals  /78   Pulse (!) 51   Temp 97.5 °F (36.4 °C) (Temporal)   Resp 18   Ht 5' 7\" (1.702 m)   Wt 155 lb (70.3 kg)   SpO2 97%   BMI 24.28 kg/m²         May Alex MD

## 2021-06-29 NOTE — ASSESSMENT & PLAN NOTE
Asymptomatic, asking about statin and relationship to memory changes. It is possible, but with recent STEMI ('19) I would not recommend stopping statin at this time. Did review it maybe worth a trial off medication for 2-3 months but would need to discuss with cardiology 1st.  They are asking for 2nd opinion. Currently seeing Dr John Schrader at [address] [facility] and would like to transfer.   List of providers at [facility] provided, see AVS

## 2021-06-29 NOTE — ASSESSMENT & PLAN NOTE
New dx, reviewed official testing, reviewed tx options and expectations in depth with him and wife. Will start on Aricept 5mg daily. If in 3 months doing well, will increase to 10mg.

## 2021-08-23 DIAGNOSIS — G31.84 MCI (MILD COGNITIVE IMPAIRMENT) WITH MEMORY LOSS: ICD-10-CM

## 2021-08-23 RX ORDER — DONEPEZIL HYDROCHLORIDE 10 MG/1
10 TABLET, FILM COATED ORAL
Qty: 90 TABLET | Refills: 1 | Status: SHIPPED | OUTPATIENT
Start: 2021-08-23 | End: 2022-02-25

## 2021-09-09 ENCOUNTER — OFFICE VISIT (OUTPATIENT)
Dept: CARDIOLOGY CLINIC | Age: 86
End: 2021-09-09
Payer: MEDICARE

## 2021-09-09 ENCOUNTER — TELEPHONE (OUTPATIENT)
Dept: INTERNAL MEDICINE CLINIC | Age: 86
End: 2021-09-09

## 2021-09-09 VITALS
HEIGHT: 67 IN | WEIGHT: 158 LBS | RESPIRATION RATE: 18 BRPM | SYSTOLIC BLOOD PRESSURE: 132 MMHG | BODY MASS INDEX: 24.8 KG/M2 | OXYGEN SATURATION: 97 % | HEART RATE: 46 BPM | DIASTOLIC BLOOD PRESSURE: 78 MMHG

## 2021-09-09 DIAGNOSIS — R00.1 BRADYCARDIA: Primary | ICD-10-CM

## 2021-09-09 DIAGNOSIS — I10 ESSENTIAL HYPERTENSION: ICD-10-CM

## 2021-09-09 DIAGNOSIS — I25.10 CORONARY ARTERY DISEASE INVOLVING NATIVE CORONARY ARTERY OF NATIVE HEART WITHOUT ANGINA PECTORIS: ICD-10-CM

## 2021-09-09 DIAGNOSIS — G31.84 MCI (MILD COGNITIVE IMPAIRMENT) WITH MEMORY LOSS: ICD-10-CM

## 2021-09-09 PROCEDURE — 99204 OFFICE O/P NEW MOD 45 MIN: CPT | Performed by: SPECIALIST

## 2021-09-09 PROCEDURE — 93000 ELECTROCARDIOGRAM COMPLETE: CPT | Performed by: SPECIALIST

## 2021-09-09 NOTE — PROGRESS NOTES
Gissel Daniels     [birth year]       Janeth Bullard MD, [facility] [place]  Date of Visit-9/9/2021   PCP is Gregg Christian MD   [facility] [place]  [facility]  HPI:  Gissel Daniels is a 81 y.o. male   New consult , hx of CAD and STEMI  Pt is seen for bradycardia. Pt is on metoprolol and felodipine along with HCTZ for HTN. They reported a pulse of 39 and called for an earlier appointment. Saw Dr. Debbie Villalta in June about cognitive changes. Has a hx of CAD and HTN. Saw his prior cardiologist in 2020. That SALMA is reviewed. That reports a hx of CAD and CMY, the details not available on SALMA. Pt is here for second opinion. Hospital records= Pt had a STEMI 4/23/19 to the LAD with an EF of 50% and wall motion abnormalities. He had a stent to a totally occluded LAD, and theCicumflex was stented. The troponin went to 80. EKG today shows SB at 43, IN at 210, and RS of 96. Today. .. Pt is accompanied by his wife. Pt notes that he has been dealing with his SB since the start of this year. Pt's wife observes that he has been more fatigued, and he notes that he feels it in the morning. Pt has received the COVID-19 vaccine. Pt is an ex-smoker from when he was a young man, and notes his mother passed away from an MI. He reports that when he had the MI, he felt his chest was \"full\" and not normal. His wife notes that he has some high BP's often, with one recently being around 155. Denies chest pain, edema, syncope or shortness of breath at rest, has no tachycardia, palpitations or sense of arrhythmia. EKG: Marked sinus  Bradycardia rate 43   QRS 96  Low voltage in limb leads. Assessment/Plan:     Patient Instructions   Please stop your metoprolol. We will see you back in about a week for a 48 hour Holter monitor. Please keep a blood pressure diary. Please check your blood pressure every other day at different times.  Be sure to make sure you are sitting still for at least five minutes with both feet flat on the floor and arm heart level. Please write down the blood pressure, heart rate, date and time. Bring your diary to your 3 week follow up with an echocardiogram.       1. Bradycardia  He is not  Highly  symptomatic but perhaps fatigued. HR is low. He is well out from his MI. We will stop metoprolol . In a week we will do 48 hr loop to look for pauses. We discussed possible pacemaker if symptomatic or is surprise on monitor. 2. Coronary artery disease involving native coronary artery of native heart without angina pectoris  Prior STEMI. Has been pain-free. We will get records of any prior stress testing. I will check echo in next visit as there was some decrease in EF WMA. 3. Essential hypertension  They will need to watch BP and have their facility do one every other day and see back in 3 week with readings. Consider ACE. 4. MCI (mild cognitive impairment) with memory loss  Pt on donezapril. F/u in 3 weeks     Impression:   1. Bradycardia    2. Coronary artery disease involving native coronary artery of native heart without angina pectoris    3. Essential hypertension    4. MCI (mild cognitive impairment) with memory loss       Cardiac History:   No specialty comments available. Future Appointments   Date Time Provider Valentino España   12/3/2021 10:00 AM Agustina Wallace MD [facility] AM[facility]        ROS-except as noted above. . A complete cardiac and respiratory are reviewed and negative except as above ; Resp-denies wheezing  or productive cough,.  Const- No unusual weight loss or fever; Neuro-no recent seizure or CVA ; GI- No BRBPR, abdom pain, bloating ; - no  hematuria   see supplement sheet, initialed and to be scanned by staff  Past Medical History:   Diagnosis Date    BPH (benign prostatic hypertrophy)     CAD (coronary artery disease)     Cataract, bilateral 04/06/2016    s/p surgery in 2016    Hypertension  Insomnia 2/11/2015    Shingles       Social Hx= reports that he has quit smoking. He has never used smokeless tobacco. He reports current alcohol use of about 6.0 - 8.0 standard drinks of alcohol per week. He reports that he does not use drugs. Exam and Labs:  /78 (BP 1 Location: Left upper arm, BP Patient Position: Sitting, BP Cuff Size: Adult)   Pulse (!) 46   Resp 18   Ht 5' 7\" (1.702 m)   Wt 158 lb (71.7 kg)   SpO2 97%   BMI 24.75 kg/m² Constitutional:  NAD, comfortable  Head: NC,AT. Eyes: No scleral icterus. Neck:  Neck supple. No JVD present. Throat: moist mucous membranes. Chest: Effort normal & normal respiratory excursion . Neurological: alert, conversant and oriented . Skin: Skin is not cold. No obvious systemic rash noted. Not diaphoretic. No erythema. Psychiatric:  Grossly normal mood and affect. Behavior appears normal. Extremities:  no clubbing or cyanosis. Abdomen: non distended    Lungs:breath sounds normal. No stridor. distress, wheezes or  Rales. Heart: normal rate, regular rhythm, normal S1, S2, no murmurs, rubs, clicks or gallops , PMI non displaced. Edema: Edema is none.   Lab Results   Component Value Date/Time    Cholesterol, total 157 10/19/2020 10:12 AM    HDL Cholesterol 84 10/19/2020 10:12 AM    LDL, calculated 58 10/19/2020 10:12 AM    LDL, calculated 113.6 (H) 10/27/2009 09:12 AM    Triglyceride 77 10/19/2020 10:12 AM    CHOL/HDL Ratio 3.0 10/27/2009 09:12 AM     Lab Results   Component Value Date/Time    Sodium 138 05/26/2021 03:15 PM    Potassium 4.4 05/26/2021 03:15 PM    Chloride 108 05/26/2021 03:15 PM    CO2 27 05/26/2021 03:15 PM    Anion gap 3 (L) 05/26/2021 03:15 PM    Glucose 103 (H) 05/26/2021 03:15 PM    BUN 23 (H) 05/26/2021 03:15 PM    Creatinine 0.77 05/26/2021 03:15 PM    BUN/Creatinine ratio 30 (H) 05/26/2021 03:15 PM    GFR est AA >60 05/26/2021 03:15 PM    GFR est non-AA >60 05/26/2021 03:15 PM    Calcium 9.1 05/26/2021 03:15 PM      Wt Readings from Last 3 Encounters:   09/09/21 158 lb (71.7 kg)   06/28/21 155 lb (70.3 kg)   05/26/21 159 lb (72.1 kg)      BP Readings from Last 3 Encounters:   09/09/21 132/78   06/28/21 138/78   05/26/21 122/69      Current Outpatient Medications   Medication Sig    donepeziL (ARICEPT) 10 mg tablet Take 1 Tablet by mouth nightly.  hydroCHLOROthiazide (HYDRODIURIL) 12.5 mg tablet Take 1 Tab by mouth daily.  felodipine (PLENDIL SR) 10 mg 24 hr tablet Take 1 tablet by mouth  daily    rosuvastatin (CRESTOR) 5 mg tablet Take 5 mg by mouth daily.  Myrbetriq 25 mg ER tablet Take 25 mg by mouth daily.  silodosin (RAPAFLO) 8 mg capsule Take 8 mg by mouth daily.  diclofenac (Voltaren) 1 % gel Apply  to affected area daily.  aspirin 81 mg chewable tablet Take 1 Tab by mouth daily.  MELATONIN PO Take 3 mg by mouth nightly as needed.  finasteride (PROSCAR) 5 mg tablet Take 5 mg by mouth daily.  multivitamins-minerals-lutein (CENTRUM SILVER) Tab Take 1 Tab by mouth two (2) times a week. No current facility-administered medications for this visit. Impression see above.       Written by Aleks Carvajal, as dictated by Billy Jhaveri MD.

## 2021-09-09 NOTE — PATIENT INSTRUCTIONS
Please stop your metoprolol. We will see you back in about a week for a 48 hour Holter monitor. Please keep a blood pressure diary. Please check your blood pressure every other day at different times. Be sure to make sure you are sitting still for at least five minutes with both feet flat on the floor and arm heart level. Please write down the blood pressure, heart rate, date and time.  Bring your diary to your 3 week follow up with an echocardiogram.

## 2021-09-09 NOTE — Clinical Note
9/9/2021    Patient: Jermaine Rubio   YOB: 1935   Date of Visit: 9/9/2021     Mariela Wu, [address] [facility]  [address] [place] [address]  Via [place]    Dear Mariela Wu MD,      Thank you for referring Mr. Catrachita Quiñones to [facility] for evaluation. My notes for this consultation are attached. If you have questions, please do not hesitate to call me. I look forward to following your patient along with you.       Sincerely,    Aruna Boyle MD

## 2021-09-09 NOTE — TELEPHONE ENCOUNTER
Cardiology note reviewed. Pt's BB stopped, HR had been in the 50-60's in the past.  Regarding PCP, which ever is going to make it easiest for them. Some patients prefer to stick with me, but they have to come to the office or do a virtual visit.   Some patients prefer to see the PCP at [facility] since it is easier and more convenient

## 2021-09-09 NOTE — TELEPHONE ENCOUNTER
Patient reports low pulse 39. /72. Patient has not noticed any difference in behavior. Wife explained patient is fatigued. Started donepezil several months ago. Moved to [facility]. Would it be appropriate to have patient switched to facility MD. Patient has appt with Dr uHmphrey/Cardiology on 09/21/21.     Called Cardiology, patient scheduled today with Dr Matt Bourgeois @ 11:30am.

## 2021-09-17 ENCOUNTER — CLINICAL SUPPORT (OUTPATIENT)
Dept: CARDIOLOGY CLINIC | Age: 86
End: 2021-09-17
Payer: MEDICARE

## 2021-09-17 DIAGNOSIS — R00.1 BRADYCARDIA: Primary | ICD-10-CM

## 2021-09-17 PROCEDURE — 93225 XTRNL ECG REC<48 HRS REC: CPT | Performed by: SPECIALIST

## 2021-09-17 PROCEDURE — 93227 XTRNL ECG REC<48 HR R&I: CPT | Performed by: SPECIALIST

## 2021-09-29 ENCOUNTER — TELEPHONE (OUTPATIENT)
Dept: CARDIOLOGY CLINIC | Age: 86
End: 2021-09-29

## 2021-09-30 ENCOUNTER — ANCILLARY PROCEDURE (OUTPATIENT)
Dept: CARDIOLOGY CLINIC | Age: 86
End: 2021-09-30
Payer: MEDICARE

## 2021-09-30 ENCOUNTER — OFFICE VISIT (OUTPATIENT)
Dept: CARDIOLOGY CLINIC | Age: 86
End: 2021-09-30

## 2021-09-30 VITALS
WEIGHT: 158 LBS | BODY MASS INDEX: 24.8 KG/M2 | DIASTOLIC BLOOD PRESSURE: 78 MMHG | SYSTOLIC BLOOD PRESSURE: 132 MMHG | HEIGHT: 67 IN

## 2021-09-30 VITALS
HEIGHT: 67 IN | BODY MASS INDEX: 25.43 KG/M2 | WEIGHT: 162 LBS | OXYGEN SATURATION: 97 % | HEART RATE: 64 BPM | SYSTOLIC BLOOD PRESSURE: 120 MMHG | RESPIRATION RATE: 18 BRPM | DIASTOLIC BLOOD PRESSURE: 60 MMHG

## 2021-09-30 DIAGNOSIS — I25.10 CORONARY ARTERY DISEASE INVOLVING NATIVE CORONARY ARTERY OF NATIVE HEART WITHOUT ANGINA PECTORIS: ICD-10-CM

## 2021-09-30 DIAGNOSIS — I10 ESSENTIAL HYPERTENSION: ICD-10-CM

## 2021-09-30 DIAGNOSIS — R00.1 BRADYCARDIA: Primary | ICD-10-CM

## 2021-09-30 DIAGNOSIS — R00.1 BRADYCARDIA: ICD-10-CM

## 2021-09-30 DIAGNOSIS — G31.84 MCI (MILD COGNITIVE IMPAIRMENT) WITH MEMORY LOSS: ICD-10-CM

## 2021-09-30 LAB
AV R PG: 40.68 MMHG
ECHO AO ASC DIAM: 3.15 CM
ECHO AO ROOT DIAM: 3.02 CM
ECHO AR MAX VEL PISA: 318.9 CM/S
ECHO AV AREA PEAK VELOCITY: 3.55 CM2
ECHO AV AREA VTI: 4.09 CM2
ECHO AV AREA/BSA PEAK VELOCITY: 1.9 CM2/M2
ECHO AV AREA/BSA VTI: 2.2 CM2/M2
ECHO AV MEAN GRADIENT: 3.47 MMHG
ECHO AV PEAK GRADIENT: 6.31 MMHG
ECHO AV PEAK VELOCITY: 125.63 CM/S
ECHO AV REGURGITANT PHT: 626.06 MS
ECHO AV VTI: 23.16 CM
ECHO IVC PROX: 1.11 CM
ECHO LA AREA 4C: 14.99 CM2
ECHO LA MAJOR AXIS: 2.77 CM
ECHO LA MINOR AXIS: 1.5 CM
ECHO LA VOL 2C: 52.13 ML (ref 18–58)
ECHO LA VOL 4C: 35.24 ML (ref 18–58)
ECHO LA VOL BP: 46.11 ML (ref 18–58)
ECHO LA VOL/BSA BIPLANE: 24.92 ML/M2 (ref 16–28)
ECHO LA VOLUME INDEX A2C: 28.18 ML/M2 (ref 16–28)
ECHO LA VOLUME INDEX A4C: 19.05 ML/M2 (ref 16–28)
ECHO LV E' LATERAL VELOCITY: 5.85 CM/S
ECHO LV E' SEPTAL VELOCITY: 5.15 CM/S
ECHO LV EDV A2C: 93.68 ML
ECHO LV EDV A4C: 71.19 ML
ECHO LV EDV BP: 80.43 ML (ref 67–155)
ECHO LV EDV INDEX A4C: 38.5 ML/M2
ECHO LV EDV INDEX BP: 43.5 ML/M2
ECHO LV EDV NDEX A2C: 50.6 ML/M2
ECHO LV EJECTION FRACTION A2C: 60 PERCENT
ECHO LV EJECTION FRACTION A4C: 70 PERCENT
ECHO LV EJECTION FRACTION BIPLANE: 64.4 PERCENT (ref 55–100)
ECHO LV ESV A2C: 37.11 ML
ECHO LV ESV A4C: 21.6 ML
ECHO LV ESV BP: 28.6 ML (ref 22–58)
ECHO LV ESV INDEX A2C: 20.1 ML/M2
ECHO LV ESV INDEX A4C: 11.7 ML/M2
ECHO LV ESV INDEX BP: 15.5 ML/M2
ECHO LV INTERNAL DIMENSION DIASTOLIC: 4.07 CM (ref 4.2–5.9)
ECHO LV INTERNAL DIMENSION SYSTOLIC: 2.74 CM
ECHO LV IVSD: 0.94 CM (ref 0.6–1)
ECHO LV MASS 2D: 118 G (ref 88–224)
ECHO LV MASS INDEX 2D: 63.8 G/M2 (ref 49–115)
ECHO LV POSTERIOR WALL DIASTOLIC: 0.92 CM (ref 0.6–1)
ECHO LVOT DIAM: 2.17 CM
ECHO LVOT PEAK GRADIENT: 5.82 MMHG
ECHO LVOT PEAK VELOCITY: 120.59 CM/S
ECHO LVOT SV: 94.7 ML
ECHO LVOT VTI: 25.6 CM
ECHO MV A VELOCITY: 84.5 CM/S
ECHO MV AREA PHT: 2.88 CM2
ECHO MV E DECELERATION TIME (DT): 263.37 MS
ECHO MV E VELOCITY: 45.04 CM/S
ECHO MV E/A RATIO: 0.53
ECHO MV E/E' LATERAL: 7.7
ECHO MV E/E' RATIO (AVERAGED): 8.22
ECHO MV E/E' SEPTAL: 8.75
ECHO MV PRESSURE HALF TIME (PHT): 76.38 MS
ECHO RV TAPSE: 1.73 CM (ref 1.5–2)
LA VOL DISK BP: 43.61 ML (ref 18–58)
LVOT MG: 2.93 MMHG

## 2021-09-30 PROCEDURE — G8432 DEP SCR NOT DOC, RNG: HCPCS | Performed by: SPECIALIST

## 2021-09-30 PROCEDURE — 99214 OFFICE O/P EST MOD 30 MIN: CPT | Performed by: SPECIALIST

## 2021-09-30 PROCEDURE — 93306 TTE W/DOPPLER COMPLETE: CPT | Performed by: SPECIALIST

## 2021-09-30 PROCEDURE — G8419 CALC BMI OUT NRM PARAM NOF/U: HCPCS | Performed by: SPECIALIST

## 2021-09-30 PROCEDURE — G8754 DIAS BP LESS 90: HCPCS | Performed by: SPECIALIST

## 2021-09-30 PROCEDURE — G8752 SYS BP LESS 140: HCPCS | Performed by: SPECIALIST

## 2021-09-30 PROCEDURE — 1101F PT FALLS ASSESS-DOCD LE1/YR: CPT | Performed by: SPECIALIST

## 2021-09-30 PROCEDURE — G8536 NO DOC ELDER MAL SCRN: HCPCS | Performed by: SPECIALIST

## 2021-09-30 PROCEDURE — G8427 DOCREV CUR MEDS BY ELIG CLIN: HCPCS | Performed by: SPECIALIST

## 2021-09-30 NOTE — PROGRESS NOTES
Babak Llamas     [birth year]       Janeth Castillo MD, [facility] [place]  Date of Visit-9/30/2021   PCP is Margie Nevarez MD   [facility] [place]  [facility]  HPI:  Babak Llamas is a 81 y.o. male   3 week f/u. Pt is seen for bradycardia. Pt is on metoprolol and felodipine along with HCTZ for HTN. They reported a pulse of 39 and called for an earlier appointment. Saw Dr. Connie Holloway in June about cognitive changes. Has a hx of CAD and HTN. Saw his prior cardiologist in 2020. That SALMA is reviewed. That reports a hx of CAD and CMY, the details not available on SALMA. Pt is here for second opinion. Pt had a STEMI 4/23/19 to the LAD with an EF of 50% and wall motion abnormalities. He had an acute stent and a totally occluded LAD, and the circ flex was stented. The troponin went to 80. EKG today shows SB at 43, RI at 210, and RS of 96. Today. .. Last visit we stopped Metoprolol and did a 48 hour loop. His HR had gotten to 39, and loop shows average HR now of 67. monitor  It shows sinus bradycardia to sinus tachycardia with an average heart rate of 67. There are no periods of pauses. The maximal heart rate is 119 and the minimal heart rate 49. There were 1388 PVCs with a burden of 0.71%. There are 513 P SVC's with a burden of 0.26%. There were 9 occurrences of SVT longest at 7 beats. Overall the monitor shows some ectopy but otherwise benign there is 24% burden of bradycardia but not lower than 49. Date of monitoring is September 17 December 19, 2021      Overall the pt states he is doing well. Pt is accompanied by his wife. His wife notes that he tends to get fatigued easily. No symptoms   Denies chest pain, edema, syncope or shortness of breath at rest, has no tachycardia, palpitations or sense of arrhythmia.     BP diary  9/11 145/76 63  9/13 136/75 79  9/15 159/81  71  9/17 171/77 64    Records  Echo 4/24/19 EF 50-55% MAC  Note other cards 12/7/2020 hx ACS Sent LAD 4/23/19 reviewed   Assessment/Plan:     Patient Instructions   We will see you back in 6 months. 1. Bradycardia  With less Metoprolol he is doing well with his HR at a reasonable weight and no angina. 2. Coronary artery disease involving native coronary artery of native heart without angina pectoris  Prior STEMI 4/23/19 at the LAD. Has no current pain. 3. Essential hypertension  Well controlled. Home cuff high but here bp have been normal, went over technique to check  At goal , meds and possible side effects reviewed and patient denies  Key CAD CHF Meds             hydroCHLOROthiazide (HYDRODIURIL) 12.5 mg tablet (Taking) Take 1 Tab by mouth daily. felodipine (PLENDIL SR) 10 mg 24 hr tablet (Taking) Take 1 tablet by mouth  daily    rosuvastatin (CRESTOR) 5 mg tablet (Taking) Take 5 mg by mouth daily. aspirin 81 mg chewable tablet (Taking) Take 1 Tab by mouth daily. BP Readings from Last 6 Encounters:   09/30/21 132/78   09/30/21 120/60   09/09/21 132/78   06/28/21 138/78   05/26/21 122/69   02/23/21 (!) 153/76        4. MCI (mild cognitive impairment) with memory loss  stable      F/u in 4-6 months     Impression:   1. Bradycardia    2. Coronary artery disease involving native coronary artery of native heart without angina pectoris    3. Essential hypertension    4. MCI (mild cognitive impairment) with memory loss       Cardiac History:   monitor  It shows sinus bradycardia to sinus tachycardia with an average heart rate of 67. There are no periods of pauses. The maximal heart rate is 119 and the minimal heart rate 49. There were 1388 PVCs with a burden of 0.71%. There are 513 P SVC's with a burden of 0.26%. There were 9 occurrences of SVT longest at 7 beats. Overall the monitor shows some ectopy but otherwise benign there is 24% burden of bradycardia but not lower than 49.     Date of monitoring is September 17 December 19, 2021     Future Appointments Date Time Provider Valentino España   12/3/2021 10:00 AM MD ALEX Gonzalez [facility] AMB   3/31/2022 10:40 AM Janeth Elaine MD [facility]        ROS-except as noted above. . A complete cardiac and respiratory are reviewed and negative except as above ; Resp-denies wheezing  or productive cough,. Const- No unusual weight loss or fever; Neuro-no recent seizure or CVA ; GI- No BRBPR, abdom pain, bloating ; - no  hematuria   see supplement sheet, initialed and to be scanned by staff  Past Medical History:   Diagnosis Date    BPH (benign prostatic hypertrophy)     CAD (coronary artery disease)     Cataract, bilateral 04/06/2016    s/p surgery in 2016    Hypertension     Insomnia 2/11/2015    Shingles       Social Hx= reports that he has quit smoking. He has never used smokeless tobacco. He reports current alcohol use of about 6.0 - 8.0 standard drinks of alcohol per week. He reports that he does not use drugs. Exam and Labs:  /60 (BP 1 Location: Left upper arm, BP Patient Position: Sitting, BP Cuff Size: Adult)   Pulse 64   Resp 18   Ht 5' 7\" (1.702 m)   Wt 162 lb (73.5 kg)   SpO2 97%   BMI 25.37 kg/m² Constitutional:  NAD, comfortable  Head: NC,AT. Eyes: No scleral icterus. Neck:  Neck supple. No JVD present. Throat: moist mucous membranes. Chest: Effort normal & normal respiratory excursion . Neurological: alert, conversant and oriented . Skin: Skin is not cold. No obvious systemic rash noted. Not diaphoretic. No erythema. Psychiatric:  Grossly normal mood and affect. Behavior appears normal. Extremities:  no clubbing or cyanosis. Abdomen: non distended    Lungs:breath sounds normal. No stridor. distress, wheezes or  Rales. RQHOZ:7-0/4 systolic murmur normal rate, regular rhythm, normal S1, S2, no murmurs, rubs, clicks or gallops , PMI non displaced. Edema: Edema is none.   Lab Results   Component Value Date/Time    Cholesterol, total 157 10/19/2020 10:12 AM    HDL Cholesterol 84 10/19/2020 10:12 AM    LDL, calculated 58 10/19/2020 10:12 AM    LDL, calculated 113.6 (H) 10/27/2009 09:12 AM    Triglyceride 77 10/19/2020 10:12 AM    CHOL/HDL Ratio 3.0 10/27/2009 09:12 AM     Lab Results   Component Value Date/Time    Sodium 138 05/26/2021 03:15 PM    Potassium 4.4 05/26/2021 03:15 PM    Chloride 108 05/26/2021 03:15 PM    CO2 27 05/26/2021 03:15 PM    Anion gap 3 (L) 05/26/2021 03:15 PM    Glucose 103 (H) 05/26/2021 03:15 PM    BUN 23 (H) 05/26/2021 03:15 PM    Creatinine 0.77 05/26/2021 03:15 PM    BUN/Creatinine ratio 30 (H) 05/26/2021 03:15 PM    GFR est AA >60 05/26/2021 03:15 PM    GFR est non-AA >60 05/26/2021 03:15 PM    Calcium 9.1 05/26/2021 03:15 PM      Wt Readings from Last 3 Encounters:   09/30/21 162 lb (73.5 kg)   09/09/21 158 lb (71.7 kg)   06/28/21 155 lb (70.3 kg)      BP Readings from Last 3 Encounters:   09/30/21 120/60   09/09/21 132/78   06/28/21 138/78      Current Outpatient Medications   Medication Sig    donepeziL (ARICEPT) 10 mg tablet Take 1 Tablet by mouth nightly.  hydroCHLOROthiazide (HYDRODIURIL) 12.5 mg tablet Take 1 Tab by mouth daily.  felodipine (PLENDIL SR) 10 mg 24 hr tablet Take 1 tablet by mouth  daily    rosuvastatin (CRESTOR) 5 mg tablet Take 5 mg by mouth daily.  Myrbetriq 25 mg ER tablet Take 25 mg by mouth daily.  silodosin (RAPAFLO) 8 mg capsule Take 8 mg by mouth daily.  diclofenac (Voltaren) 1 % gel Apply  to affected area daily.  aspirin 81 mg chewable tablet Take 1 Tab by mouth daily.  MELATONIN PO Take 3 mg by mouth nightly as needed.  finasteride (PROSCAR) 5 mg tablet Take 5 mg by mouth daily.  multivitamins-minerals-lutein (CENTRUM SILVER) Tab Take 1 Tab by mouth two (2) times a week. No current facility-administered medications for this visit. Impression see above.       Written by Archie Jones, as dictated by Yelena Gusman MD.

## 2021-09-30 NOTE — TELEPHONE ENCOUNTER
Patient seen in office on September 9 for bradycardia patient was on metoprolol which was stopped heart rate was 39. Future Appointments   Date Time Provider Valentino Patria   9/30/2021 10:30 AM BRITTNY MURCIA AMB   9/30/2021 11:00 AM Janeth Elaine MD [facility] AMB   12/3/2021 10:00 AM MD ALEX [facility]      Have an appointment in the morning and will discuss this monitor  It shows sinus bradycardia to sinus tachycardia with an average heart rate of 67. There are no periods of pauses. The maximal heart rate is 119 and the minimal heart rate 49. There were 1388 PVCs with a burden of 0.71%. There are 513 P SVC's with a burden of 0.26%. There were 9 occurrences of SVT longest at 7 beats. Overall the monitor shows some ectopy but otherwise benign there is 24% burden of bradycardia but not lower than 49.     Date of monitoring is September 17 December 19, 2021

## 2021-09-30 NOTE — TELEPHONE ENCOUNTER
----- Message from Cheryl Sharma RN sent at 9/23/2021  8:39 AM EDT -----  Patient's 48 hour Holter ready for review.

## 2021-09-30 NOTE — Clinical Note
9/30/2021    Patient: Jordan Rojas   YOB: 1935   Date of Visit: 9/30/2021     Maximiliano Frye, [address] [facility] [place] [place] [address] [place] [address]  [address] [facility] [address]    Dear Maximiliano Frye MD,      Thank you for referring Mr. Emma Barcenas to [facility] for evaluation. My notes for this consultation are attached. If you have questions, please do not hesitate to call me. I look forward to following your patient along with you.       Sincerely,    Toña Curry MD

## 2021-11-13 DIAGNOSIS — I10 ESSENTIAL HYPERTENSION: ICD-10-CM

## 2021-11-14 RX ORDER — HYDROCHLOROTHIAZIDE 12.5 MG/1
TABLET ORAL
Qty: 90 TABLET | Refills: 1 | Status: SHIPPED | OUTPATIENT
Start: 2021-11-14 | End: 2022-05-01

## 2021-12-03 ENCOUNTER — OFFICE VISIT (OUTPATIENT)
Dept: INTERNAL MEDICINE CLINIC | Age: 86
End: 2021-12-03
Payer: MEDICARE

## 2021-12-03 VITALS
SYSTOLIC BLOOD PRESSURE: 130 MMHG | HEIGHT: 67 IN | HEART RATE: 68 BPM | WEIGHT: 168 LBS | OXYGEN SATURATION: 97 % | BODY MASS INDEX: 26.37 KG/M2 | DIASTOLIC BLOOD PRESSURE: 76 MMHG | RESPIRATION RATE: 18 BRPM | TEMPERATURE: 97.5 F

## 2021-12-03 DIAGNOSIS — N40.1 BPH ASSOCIATED WITH NOCTURIA: ICD-10-CM

## 2021-12-03 DIAGNOSIS — Z00.00 MEDICARE ANNUAL WELLNESS VISIT, SUBSEQUENT: Primary | ICD-10-CM

## 2021-12-03 DIAGNOSIS — R35.1 BPH ASSOCIATED WITH NOCTURIA: ICD-10-CM

## 2021-12-03 DIAGNOSIS — I10 PRIMARY HYPERTENSION: ICD-10-CM

## 2021-12-03 DIAGNOSIS — I25.10 CORONARY ARTERY DISEASE INVOLVING NATIVE CORONARY ARTERY OF NATIVE HEART WITHOUT ANGINA PECTORIS: ICD-10-CM

## 2021-12-03 DIAGNOSIS — Z71.89 ADVANCED CARE PLANNING/COUNSELING DISCUSSION: ICD-10-CM

## 2021-12-03 DIAGNOSIS — G31.84 MCI (MILD COGNITIVE IMPAIRMENT) WITH MEMORY LOSS: ICD-10-CM

## 2021-12-03 PROBLEM — R41.3 MEMORY LOSS: Status: RESOLVED | Noted: 2021-05-26 | Resolved: 2021-12-03

## 2021-12-03 LAB
ALBUMIN SERPL-MCNC: 3.5 G/DL (ref 3.5–5)
ALBUMIN/GLOB SERPL: 1.2 {RATIO} (ref 1.1–2.2)
ALP SERPL-CCNC: 78 U/L (ref 45–117)
ALT SERPL-CCNC: 24 U/L (ref 12–78)
ANION GAP SERPL CALC-SCNC: 6 MMOL/L (ref 5–15)
AST SERPL-CCNC: 19 U/L (ref 15–37)
BILIRUB SERPL-MCNC: 0.7 MG/DL (ref 0.2–1)
BUN SERPL-MCNC: 14 MG/DL (ref 6–20)
BUN/CREAT SERPL: 16 (ref 12–20)
CALCIUM SERPL-MCNC: 9.1 MG/DL (ref 8.5–10.1)
CHLORIDE SERPL-SCNC: 108 MMOL/L (ref 97–108)
CHOLEST SERPL-MCNC: 168 MG/DL
CO2 SERPL-SCNC: 26 MMOL/L (ref 21–32)
CREAT SERPL-MCNC: 0.85 MG/DL (ref 0.7–1.3)
ERYTHROCYTE [DISTWIDTH] IN BLOOD BY AUTOMATED COUNT: 13 % (ref 11.5–14.5)
GLOBULIN SER CALC-MCNC: 3 G/DL (ref 2–4)
GLUCOSE SERPL-MCNC: 109 MG/DL (ref 65–100)
HCT VFR BLD AUTO: 46.9 % (ref 36.6–50.3)
HDLC SERPL-MCNC: 86 MG/DL
HDLC SERPL: 2 {RATIO} (ref 0–5)
HGB BLD-MCNC: 15.2 G/DL (ref 12.1–17)
LDLC SERPL CALC-MCNC: 61.4 MG/DL (ref 0–100)
MCH RBC QN AUTO: 33 PG (ref 26–34)
MCHC RBC AUTO-ENTMCNC: 32.4 G/DL (ref 30–36.5)
MCV RBC AUTO: 102 FL (ref 80–99)
NRBC # BLD: 0 K/UL (ref 0–0.01)
NRBC BLD-RTO: 0 PER 100 WBC
PLATELET # BLD AUTO: 276 K/UL (ref 150–400)
PMV BLD AUTO: 10 FL (ref 8.9–12.9)
POTASSIUM SERPL-SCNC: 4.6 MMOL/L (ref 3.5–5.1)
PROT SERPL-MCNC: 6.5 G/DL (ref 6.4–8.2)
RBC # BLD AUTO: 4.6 M/UL (ref 4.1–5.7)
SODIUM SERPL-SCNC: 140 MMOL/L (ref 136–145)
TRIGL SERPL-MCNC: 103 MG/DL (ref ?–150)
VLDLC SERPL CALC-MCNC: 20.6 MG/DL
WBC # BLD AUTO: 6.9 K/UL (ref 4.1–11.1)

## 2021-12-03 PROCEDURE — 1101F PT FALLS ASSESS-DOCD LE1/YR: CPT | Performed by: INTERNAL MEDICINE

## 2021-12-03 PROCEDURE — G0439 PPPS, SUBSEQ VISIT: HCPCS | Performed by: INTERNAL MEDICINE

## 2021-12-03 PROCEDURE — G8536 NO DOC ELDER MAL SCRN: HCPCS | Performed by: INTERNAL MEDICINE

## 2021-12-03 PROCEDURE — G8419 CALC BMI OUT NRM PARAM NOF/U: HCPCS | Performed by: INTERNAL MEDICINE

## 2021-12-03 PROCEDURE — 99214 OFFICE O/P EST MOD 30 MIN: CPT | Performed by: INTERNAL MEDICINE

## 2021-12-03 PROCEDURE — G8510 SCR DEP NEG, NO PLAN REQD: HCPCS | Performed by: INTERNAL MEDICINE

## 2021-12-03 PROCEDURE — G8752 SYS BP LESS 140: HCPCS | Performed by: INTERNAL MEDICINE

## 2021-12-03 PROCEDURE — G8427 DOCREV CUR MEDS BY ELIG CLIN: HCPCS | Performed by: INTERNAL MEDICINE

## 2021-12-03 PROCEDURE — G8754 DIAS BP LESS 90: HCPCS | Performed by: INTERNAL MEDICINE

## 2021-12-03 RX ORDER — METOPROLOL TARTRATE 25 MG/1
25 TABLET, FILM COATED ORAL DAILY
COMMUNITY
Start: 2021-11-30 | End: 2022-03-31

## 2021-12-03 NOTE — ASSESSMENT & PLAN NOTE
Stable, no decline, wife reports maybe an improvement, reviewed expectations w/ medications, continue on Aricept, reviewed when to consider adding in Trbonje

## 2021-12-03 NOTE — ACP (ADVANCE CARE PLANNING)
Advance Care Planning   Advance Care Planning (ACP) Physician/NP/PA (Provider) Conversation    Date of ACP Conversation: 12/03/21  Persons included in Conversation:  patient and family  Length of ACP Conversation in minutes: <16 minutes (Non-Billable)    Authorized Decision Maker (if patient is incapable of making informed decisions):   Named in Advance Directive or Healthcare Power of       Primary Decision Maker: Maame [facility] [phone]    He has an advanced directive - a copy has been provided & still reflects him wishes. Reviewed DNR/DNI and patient is interested in remaining a DNR, no changes made.        Michelle Lopez MD

## 2021-12-03 NOTE — PROGRESS NOTES
Jordan Rojas is a 81 y.o. male who was seen in clinic today (12/3/2021) for a full physical.          Assessment & Plan:   Below is the assessment and plan developed based on review of pertinent history, physical exam, labs, studies, and medications. 1. Medicare annual wellness visit, subsequent  2. Advanced care planning/counseling discussion  3. MCI (mild cognitive impairment) with memory loss  Assessment & Plan:  Stable, no decline, wife reports maybe an improvement, reviewed expectations w/ medications, continue on Aricept, reviewed when to consider adding in Namenda   4. BPH associated with nocturia  Assessment & Plan:  stable, continue current treatment as directed by specialist  5. Primary hypertension  Assessment & Plan:  well controlled, continue current treatment pending review of labs   6. Coronary artery disease involving native coronary artery of native heart without angina pectoris  Assessment & Plan:  Asymptomatic, monitored by specialist. No acute findings meriting change in the plan  Orders:  -     METABOLIC PANEL, COMPREHENSIVE; Future  -     CBC W/O DIFF; Future  -     LIPID PANEL; Future    Follow-up and Dispositions    · Return in about 6 months (around 6/3/2022), or if symptoms worsen or fail to improve, for Regular follow up. Subjective:   Ashok Calderon is here today for a full physical.      Annual Wellness Visit- Subsequent Visit    Since last visit: moved in [facility], he likes it. The following acute and/or chronic problems were addressed today:    Cardiovascular Review  The patient has hypertension and coronary artery disease. He reports taking medications as instructed, no medication side effects noted, patient does not perform home BP monitoring. He generally follows a low fat low cholesterol diet, generally follows a low sodium diet, exercises regularly. Neurological Review  He is here today to talk about MCI. At last visit started on Aricept.   In August dose was increased from 5mg to 10mg. Since then he reports no changes. His wife reports it is stable to slightly better. Previously did not remember how to play a card game, but now he is playing again. He is taking medications as instructed and no medication side effects noted. End of Life Planning: This was discussed with him today and he has an advanced directive - a copy has been provided. Reviewed DNR/DNI and patient is interested in remaining a DNR, no changes made. Depression Screen:  3 most recent PHQ Screens 12/3/2021   Little interest or pleasure in doing things Not at all   Feeling down, depressed, irritable, or hopeless Not at all   Total Score PHQ 2 0   Trouble falling or staying asleep, or sleeping too much -   Feeling tired or having little energy -   Poor appetite, weight loss, or overeating -   Feeling bad about yourself - or that you are a failure or have let yourself or your family down -   Trouble concentrating on things such as school, work, reading, or watching TV -   Moving or speaking so slowly that other people could have noticed; or the opposite being so fidgety that others notice -   Thoughts of being better off dead, or hurting yourself in some way -   PHQ 9 Score -   How difficult have these problems made it for you to do your work, take care of your home and get along with others -         Fall Risk:   Fall Risk Assessment, last 12 mths 12/3/2021   Able to walk? Yes   Fall in past 12 months? 1   Do you feel unsteady? 0   Are you worried about falling 0   Is TUG test greater than 12 seconds? 0   Is the gait abnormal? 0   Number of falls in past 12 months 1   Fall with injury? 0       Abuse Screen:  Abuse Screening Questionnaire 12/3/2021   Do you ever feel afraid of your partner? N   Are you in a relationship with someone who physically or mentally threatens you? N   Is it safe for you to go home?  Y       Do you average more than 1 drink per night or more than 7 drinks a week: No    In the past three months have you have had more than 4 drinks containing alcohol on one occasion: No    Health Maintenance:   Daily Aspirin: yes  AAA Screening: n/a: aged out    Immunizations:   Covid: up to date   Influenza: up to date   Tetanus: up to date   Shingles: up to date   Pneumonia: up to date  Cancer screening:   Prostate: guidelines reviewed, n/a  Colon: guidelines reviewed, up to date    Functional Ability and Level of Safety:    Hearing: Hearing is good. The patient wears hearing aids. Cognition Screen:   Has your family/caregiver stated any concerns about your memory: yes - has known diagnosis of MCI     Ambulation: with no difficulty     Activities of Daily Living: The home contains: handrails and grab bars  Patient does total self care  Exercise: moderately active    Adult Nutrition Screen:  No risk factors noted. Patient Care Team:  Joel Youssef MD as PCP - General (Internal Medicine)  Joel Youssef MD as PCP - [address] [place] [address] Provider  Kelli Reaves MD (Urology)  Ainsley Dockery MD (Dermatology)  Cristal Marvin MD as Physician (Ophthalmology)  Etelvina Samuel MD as Physician (Orthopedic Surgery)  Joel Youssef MD (Internal Medicine)  Ulysses Malhotra MD (Cardiology)       The following sections were reviewed & updated as appropriate: Problem List, Allergies, PMH, PSH, FH, and SH. Prior to Admission medications    Medication Sig Start Date End Date Taking? Authorizing Provider   metoprolol tartrate (LOPRESSOR) 25 mg tablet Take 25 mg by mouth daily. 11/30/21  Yes Provider, Historical   hydroCHLOROthiazide (HYDRODIURIL) 12.5 mg tablet TAKE 1 TABLET EVERY DAY 11/14/21  Yes Joel Youssef MD   donepeziL (ARICEPT) 10 mg tablet Take 1 Tablet by mouth nightly.  8/23/21  Yes Joel Youssef MD   felodipine (PLENDIL SR) 10 mg 24 hr tablet Take 1 tablet by mouth  daily 11/20/20  Yes Joel Youssef MD   rosuvastatin (CRESTOR) 5 mg tablet Take 5 mg by mouth daily. 8/20/20  Yes Provider, Historical   Myrbetriq 25 mg ER tablet Take 25 mg by mouth daily. 9/14/20  Yes Provider, Historical   silodosin (RAPAFLO) 8 mg capsule Take 8 mg by mouth daily. 9/14/20  Yes Provider, Historical   diclofenac (Voltaren) 1 % gel Apply  to affected area daily. Yes Provider, Historical   aspirin 81 mg chewable tablet Take 1 Tab by mouth daily. 4/25/19  Yes Mimi Sandoval MD   MELATONIN PO Take 3 mg by mouth nightly as needed. Yes Provider, Historical   finasteride (PROSCAR) 5 mg tablet Take 5 mg by mouth daily. 8/15/15  Yes Provider, Historical   multivitamins-minerals-lutein (CENTRUM SILVER) Tab Take 1 Tab by mouth two (2) times a week. Yes Provider, Historical          Review of Systems   Constitutional: Negative for chills and fever. Respiratory: Negative for cough and shortness of breath. Cardiovascular: Negative for chest pain and palpitations. Gastrointestinal: Negative for abdominal pain, blood in stool, constipation, diarrhea, heartburn, nausea and vomiting. Genitourinary:        He reports: nocturia x 2, urinary hesitancy, and rare leakage   Musculoskeletal: Negative for joint pain and myalgias. Neurological: Negative for tingling, focal weakness and headaches. Endo/Heme/Allergies: Does not bruise/bleed easily. Psychiatric/Behavioral: Negative for depression. The patient is not nervous/anxious and does not have insomnia. Objective:   Physical Exam  Constitutional:       General: He is not in acute distress. Appearance: Normal appearance. Eyes:      Conjunctiva/sclera: Conjunctivae normal.   Cardiovascular:      Rate and Rhythm: Regular rhythm. Heart sounds: No murmur heard. Pulmonary:      Effort: Pulmonary effort is normal.      Breath sounds: Normal breath sounds. No decreased breath sounds or wheezing. Abdominal:      General: Bowel sounds are normal.      Palpations: Abdomen is soft. Tenderness: There is no abdominal tenderness. Musculoskeletal:      Right lower leg: No edema. Left lower leg: No edema.    Psychiatric:         Mood and Affect: Affect normal.            Visit Vitals  /76   Pulse 68   Temp 97.5 °F (36.4 °C) (Temporal)   Resp 18   Ht 5' 7\" (1.702 m)   Wt 168 lb (76.2 kg)   SpO2 97%   BMI 26.31 kg/m²         Ara Palmer MD

## 2021-12-03 NOTE — PATIENT INSTRUCTIONS
Preventing Falls: Care Instructions  Your Care Instructions     Getting around your home safely can be a challenge if you have injuries or health problems that make it easy for you to fall. Loose rugs and furniture in walkways are among the dangers for many older people who have problems walking or who have poor eyesight. People who have conditions such as arthritis, osteoporosis, or dementia also have to be careful not to fall. You can make your home safer with a few simple measures. Follow-up care is a key part of your treatment and safety. Be sure to make and go to all appointments, and call your doctor if you are having problems. It's also a good idea to know your test results and keep a list of the medicines you take. How can you care for yourself at home? Taking care of yourself  · You may get dizzy if you do not drink enough water. To prevent dehydration, drink plenty of fluids. Choose water and other clear liquids. If you have kidney, heart, or liver disease and have to limit fluids, talk with your doctor before you increase the amount of fluids you drink. · Exercise regularly to improve your strength, muscle tone, and balance. Walk if you can. Swimming may be a good choice if you cannot walk easily. · Have your vision and hearing checked each year or any time you notice a change. If you have trouble seeing and hearing, you might not be able to avoid objects and could lose your balance. · Know the side effects of the medicines you take. Ask your doctor or pharmacist whether the medicines you take can affect your balance. Sleeping pills or sedatives can affect your balance. · Limit the amount of alcohol you drink. Alcohol can impair your balance and other senses. · Ask your doctor whether calluses or corns on your feet need to be removed. If you wear loose-fitting shoes because of calluses or corns, you can lose your balance and fall.   · Talk to your doctor if you have numbness in your feet.  Preventing falls at home  · Remove raised doorway thresholds, throw rugs, and clutter. Repair loose carpet or raised areas in the floor. · Move furniture and electrical cords to keep them out of walking paths. · Use nonskid floor wax, and wipe up spills right away, especially on ceramic tile floors. · If you use a walker or cane, put rubber tips on it. If you use crutches, clean the bottoms of them regularly with an abrasive pad, such as steel wool. · Keep your house well lit, especially [facility], and outside walkways. Use night-lights in areas such as hallways and bathrooms. Add extra light switches or use remote switches (such as switches that go on or off when you clap your hands) to make it easier to turn lights on if you have to get up during the night. · Install sturdy handrails on stairways. · Move items in your cabinets so that the things you use a lot are on the lower shelves (about waist level). · Keep a cordless phone and a flashlight with new batteries by your bed. If possible, put a phone in each of the main rooms of your house, or carry a cell phone in case you fall and cannot reach a phone. Or, you can wear a device around your neck or wrist. You push a button that sends a signal for help. · Wear low-heeled shoes that fit well and give your feet good support. Use footwear with nonskid soles. Check the heels and soles of your shoes for wear. Repair or replace worn heels or soles. · Do not wear socks without shoes on wood floors. · Walk on the grass when the sidewalks are slippery. If you live in an area that gets snow and ice in the winter, sprinkle salt on slippery steps and sidewalks. Preventing falls in the bath  · Install grab bars and nonskid mats inside and outside your shower or tub and near the toilet and sinks. · Use shower chairs and bath benches. · Use a hand-held shower head that will allow you to sit while showering.   · Get into a tub or shower by putting the weaker leg in first. Get out of a tub or shower with your strong side first.  · Repair loose toilet seats and consider installing a raised toilet seat to make getting on and off the toilet easier. · Keep your bathroom door unlocked while you are in the shower. Where can you learn more? Go to http://www.pacheco.com/  Enter G117 in the search box to learn more about \"Preventing Falls: Care Instructions. \"  Current as of: December 7, 2020               Content Version: 13.0  © 1520-3551 [employer]. Care instructions adapted under license by [employer] (which disclaims liability or warranty for this information). If you have questions about a medical condition or this instruction, always ask your healthcare professional. [address] any warranty or liability for your use of this information. Medicare Wellness Visit, Male    The best way to live healthy is to have a lifestyle where you eat a well-balanced diet, exercise regularly, limit alcohol use, and quit all forms of tobacco/nicotine, if applicable. Regular preventive services are another way to keep healthy. Preventive services (vaccines, screening tests, monitoring & exams) can help personalize your care plan, which helps you manage your own care. Screening tests can find health problems at the earliest stages, when they are easiest to treat. [facility] follows the current, evidence-based guidelines published by the [place] [employer] (USPSTF) when recommending preventive services for our patients. Because we follow these guidelines, sometimes recommendations change over time as research supports it. (For example, a prostate screening blood test is no longer routinely recommended for men with no symptoms).   Of course, you and your doctor may decide to screen more often for some diseases, based on your risk and co-morbidities (chronic disease you are already diagnosed with). Preventive services for you include:  - Medicare offers their members a free annual wellness visit, which is time for you and your primary care provider to discuss and plan for your preventive service needs. Take advantage of this benefit every year!  -All adults over age 72 should receive the recommended pneumonia vaccines. Current USPSTF guidelines recommend a series of two vaccines for the best pneumonia protection.   -All adults should have a flu vaccine yearly and tetanus vaccine every 10 years.  -All adults age 48 and older should receive the shingles vaccines (series of two vaccines). -All adults age 38-68 who are overweight should have a diabetes screening test once every three years.   -Other screening tests & preventive services for persons with diabetes include: an eye exam to screen for diabetic retinopathy, a kidney function test, a foot exam, and stricter control over your cholesterol.   -Cardiovascular screening for adults with routine risk involves an electrocardiogram (ECG) at intervals determined by the provider.   -Colorectal cancer screening should be done for adults age 54-65 with no increased risk factors for colorectal cancer. There are a number of acceptable methods of screening for this type of cancer. Each test has its own benefits and drawbacks. Discuss with your provider what is most appropriate for you during your annual wellness visit. The different tests include: colonoscopy (considered the best screening method), a fecal occult blood test, a fecal DNA test, and sigmoidoscopy.  -All adults born between [facility] should be screened once for Hepatitis C.  -An Abdominal Aortic Aneurysm (AAA) Screening is recommended for men age 73-68 who has ever smoked in their lifetime.      Here is a list of your current Health Maintenance items (your personalized list of preventive services) with a due date:  Health Maintenance Due Topic Date Due    Cholesterol Test   10/19/2021

## 2021-12-28 ENCOUNTER — TELEPHONE (OUTPATIENT)
Dept: INTERNAL MEDICINE CLINIC | Age: 86
End: 2021-12-28

## 2021-12-29 ENCOUNTER — OFFICE VISIT (OUTPATIENT)
Dept: INTERNAL MEDICINE CLINIC | Age: 86
End: 2021-12-29
Payer: MEDICARE

## 2021-12-29 VITALS
SYSTOLIC BLOOD PRESSURE: 127 MMHG | RESPIRATION RATE: 18 BRPM | TEMPERATURE: 97.8 F | HEIGHT: 67 IN | WEIGHT: 167 LBS | DIASTOLIC BLOOD PRESSURE: 65 MMHG | HEART RATE: 64 BPM | BODY MASS INDEX: 26.21 KG/M2 | OXYGEN SATURATION: 97 %

## 2021-12-29 DIAGNOSIS — N30.01 ACUTE CYSTITIS WITH HEMATURIA: Primary | ICD-10-CM

## 2021-12-29 DIAGNOSIS — R35.0 URINARY FREQUENCY: ICD-10-CM

## 2021-12-29 LAB
BILIRUB UR QL STRIP: NORMAL
GLUCOSE UR-MCNC: NEGATIVE MG/DL
KETONES P FAST UR STRIP-MCNC: NORMAL MG/DL
PH UR STRIP: 6 [PH] (ref 4.6–8)
PROT UR QL STRIP: NORMAL
SP GR UR STRIP: 1.01 (ref 1–1.03)
UA UROBILINOGEN AMB POC: NORMAL (ref 0.2–1)
URINALYSIS CLARITY POC: NORMAL
URINALYSIS COLOR POC: NORMAL
URINE BLOOD POC: NORMAL
URINE LEUKOCYTES POC: NORMAL
URINE NITRITES POC: POSITIVE

## 2021-12-29 PROCEDURE — 81003 URINALYSIS AUTO W/O SCOPE: CPT | Performed by: INTERNAL MEDICINE

## 2021-12-29 PROCEDURE — G8432 DEP SCR NOT DOC, RNG: HCPCS | Performed by: INTERNAL MEDICINE

## 2021-12-29 PROCEDURE — 99213 OFFICE O/P EST LOW 20 MIN: CPT | Performed by: INTERNAL MEDICINE

## 2021-12-29 PROCEDURE — G8536 NO DOC ELDER MAL SCRN: HCPCS | Performed by: INTERNAL MEDICINE

## 2021-12-29 PROCEDURE — G8419 CALC BMI OUT NRM PARAM NOF/U: HCPCS | Performed by: INTERNAL MEDICINE

## 2021-12-29 PROCEDURE — G8752 SYS BP LESS 140: HCPCS | Performed by: INTERNAL MEDICINE

## 2021-12-29 PROCEDURE — G8427 DOCREV CUR MEDS BY ELIG CLIN: HCPCS | Performed by: INTERNAL MEDICINE

## 2021-12-29 PROCEDURE — 1101F PT FALLS ASSESS-DOCD LE1/YR: CPT | Performed by: INTERNAL MEDICINE

## 2021-12-29 PROCEDURE — G8754 DIAS BP LESS 90: HCPCS | Performed by: INTERNAL MEDICINE

## 2021-12-29 RX ORDER — SULFAMETHOXAZOLE AND TRIMETHOPRIM 800; 160 MG/1; MG/1
1 TABLET ORAL 2 TIMES DAILY
Qty: 14 TABLET | Refills: 0 | Status: SHIPPED | OUTPATIENT
Start: 2021-12-29 | End: 2021-12-31 | Stop reason: SINTOL

## 2021-12-29 NOTE — PROGRESS NOTES
Joe Thompson is a 81 y.o. male who was seen in clinic today (12/29/2021) for an acute visit. RTC with his wife. Assessment & Plan:   Below is the assessment and plan developed based on review of pertinent history, physical exam, labs, studies, and medications. 1. Acute cystitis with hematuria  Comments:  new dx, differential reviewed, discussed why I favor this over stone or prostatitis. Will send of UC. Start on meds below. Red flags and expectations reviewed  Orders:  -     trimethoprim-sulfamethoxazole (BACTRIM DS, SEPTRA DS) 160-800 mg per tablet; Take 1 Tablet by mouth two (2) times a day for 7 days. , Normal, Disp-14 Tablet, R-0  -     CULTURE, URINE; Future  2. Urinary frequency  -     AMB POC URINALYSIS DIP STICK AUTO W/O MICRO    Follow-up and Dispositions    · Return if symptoms worsen or fail to improve. Subjective/Objective:   Rey Loomis was seen today for Urinary Frequency and Blood in Urine    Urinary Changes  Rey Loomis returns to clinic today to discuss dysuria, frequency, hematuria for 1 day. The blood was the most concerning feature. He has some some increase in lower back (midline/sacral). This is a new problem. He denies abnormal smelling urine, foul smelling urine, nausea, vomiting, diarrhea, constipation, suprapubic pressure. He reports symptoms are not changed. Patient has tried: nothing for these symptoms. He reports the following likely etiologies: nothing. Prior to Admission medications    Medication Sig Start Date End Date Taking? Authorizing Provider   metoprolol tartrate (LOPRESSOR) 25 mg tablet Take 25 mg by mouth daily. 11/30/21  Yes Provider, Historical   hydroCHLOROthiazide (HYDRODIURIL) 12.5 mg tablet TAKE 1 TABLET EVERY DAY 11/14/21  Yes George Chin MD   donepeziL (ARICEPT) 10 mg tablet Take 1 Tablet by mouth nightly.  8/23/21  Yes George Chin MD   felodipine (PLENDIL SR) 10 mg 24 hr tablet Take 1 tablet by mouth  daily 11/20/20  Yes Desirae Subramanian Callie Figueroa MD   rosuvastatin (CRESTOR) 5 mg tablet Take 5 mg by mouth daily. 8/20/20  Yes Provider, Historical   diclofenac (Voltaren) 1 % gel Apply  to affected area daily. Yes Provider, Historical   aspirin 81 mg chewable tablet Take 1 Tab by mouth daily. 4/25/19  Yes Jordy Zarate MD   MELATONIN PO Take 3 mg by mouth nightly as needed. Yes Provider, Historical   finasteride (PROSCAR) 5 mg tablet Take 5 mg by mouth daily. 8/15/15  Yes Provider, Historical   multivitamins-minerals-lutein (CENTRUM SILVER) Tab Take 1 Tab by mouth two (2) times a week. Yes Provider, Historical   Myrbetriq 25 mg ER tablet Take 25 mg by mouth daily. Patient not taking: Reported on 12/29/2021 9/14/20   Provider, Historical   silodosin (RAPAFLO) 8 mg capsule Take 8 mg by mouth daily. Patient not taking: Reported on 12/29/2021 9/14/20   Provider, Historical           Physical Exam  Abdominal:      Tenderness: There is no abdominal tenderness. There is no right CVA tenderness, left CVA tenderness, guarding or rebound. Visit Vitals  /65   Pulse 64   Temp 97.8 °F (36.6 °C) (Temporal)   Resp 18   Ht 5' 7\" (1.702 m)   Wt 167 lb (75.8 kg)   SpO2 97%   BMI 26.16 kg/m²         Advised him to call back or return to office if symptoms worsen/change/persist.  Discussed expected course/resolution/complications of diagnosis in detail with patient. Medication risks/benefits/costs/interactions/alternatives discussed with patient.     Bernie Rojas MD

## 2021-12-29 NOTE — TELEPHONE ENCOUNTER
ON CALL NOTE:   Pt wife reports he has had a large amount of blood in urine today. He is at [facility] and they were advised by nurse there to go to ER. No other symptoms. Medications & allergies reviewed. Recommend ER evaluation. Wife and patient agree.

## 2021-12-29 NOTE — PATIENT INSTRUCTIONS
Pyridium or Azo-Standard. This is to help with any discomfort when urinating. It will turn your urine bright yellow or oranage. Urinary Tract Infections (UTI) in Men: Care Instructions  Overview     A urinary tract infection, or UTI, is a term for an infection anywhere between the kidneys and the urethra. (The urethra is the tube that carries urine from the bladder to outside the body.) Most UTIs are bladder infections. They often cause pain or burning when you urinate. UTIs are caused by bacteria. This means they can be cured with antibiotics. Be sure to complete your treatment so that the infection does not get worse. Follow-up care is a key part of your treatment and safety. Be sure to make and go to all appointments, and call your doctor if you are having problems. It's also a good idea to know your test results and keep a list of the medicines you take. How can you care for yourself at home? · Take your antibiotics as prescribed. Do not stop taking them just because you feel better. You need to take the full course of antibiotics. · Take your medicines exactly as prescribed. Your doctor may have prescribed a medicine, such as phenazopyridine (Pyridium), to help relieve pain when you urinate. This turns your urine orange. You may stop taking it when your symptoms get better. But be sure to take all of your antibiotics, which treat the infection. · Drink extra water for the next day or two. This will help make the urine less concentrated and help wash out the bacteria causing the infection. (If you have kidney, heart, or liver disease and have to limit your fluids, talk with your doctor before you increase your fluid intake.)  · Avoid drinks that are carbonated or have caffeine. They can irritate the bladder. · Urinate often. Try to empty your bladder each time. · To relieve pain, take a hot bath or lay a heating pad (set on low) over your lower belly or genital area.  Never go to sleep with a heating pad in place. To help prevent UTIs  · Drink plenty of fluids. If you have kidney, heart, or liver disease and have to limit fluids, talk with your doctor before you increase the amount of fluids you drink. · Urinate when you have the urge. Do not hold your urine for a long time. Urinate before you go to sleep. · Keep your penis clean. Catheter care  If you have a drainage tube (catheter) in place, the following steps will help you care for it. · Always wash your hands before and after touching your catheter. · Check the area around the urethra for inflammation or signs of infection. Signs of infection include irritated, swollen, red, or tender skin, or pus around the catheter. · Clean the area around the catheter with soap and water two times a day. Dry with a clean towel afterward. · Do not apply powder or lotion to the skin around the catheter. To empty the urine collection bag   · Wash your hands with soap and water. · Without touching the drain spout, remove the spout from its sleeve at the bottom of the collection bag. Open the valve on the spout. · Let the urine flow out of the bag and into the toilet or a container. Do not let the tubing or drain spout touch anything. · After you empty the bag, clean the end of the drain spout with tissue and water. Close the valve and put the drain spout back into its sleeve at the bottom of the collection bag. · Wash your hands with soap and water. When should you call for help? Call your doctor now or seek immediate medical care if:    · Symptoms such as a fever, chills, nausea, or vomiting get worse or happen for the first time.     · You have new pain in your back just below your rib cage. This is called flank pain.     · There is new blood or pus in your urine.     · You are not able to take or keep down your antibiotics.    Watch closely for changes in your health, and be sure to contact your doctor if:    · You are not getting better after taking an antibiotic for 2 days.     · Your symptoms go away but then come back. Where can you learn more? Go to http://www.gray.com/  Enter V773 in the search box to learn more about \"Urinary Tract Infections (UTI) in Men: Care Instructions. \"  Current as of: February 10, 2021               Content Version: 13.0  © 6921-6454 [employer]. Care instructions adapted under license by [employer] (which disclaims liability or warranty for this information). If you have questions about a medical condition or this instruction, always ask your healthcare professional. [place] [address] any warranty or liability for your use of this information.

## 2021-12-31 ENCOUNTER — TELEPHONE (OUTPATIENT)
Dept: INTERNAL MEDICINE CLINIC | Age: 86
End: 2021-12-31

## 2021-12-31 RX ORDER — CEFUROXIME AXETIL 250 MG/1
250 TABLET ORAL 2 TIMES DAILY
Qty: 14 TABLET | Refills: 0 | Status: SHIPPED | OUTPATIENT
Start: 2021-12-31 | End: 2022-01-07

## 2021-12-31 NOTE — TELEPHONE ENCOUNTER
On call- having diarrhea from Septra started for uti. In no distress and uti is better. Culture is not back yet    Advised-  - stop septra, sent in cefuroxime  - probiotic  - BRAT diet for now.   - call if sx persist

## 2022-01-01 LAB — BACTERIA UR CULT: ABNORMAL

## 2022-01-03 NOTE — PROGRESS NOTES
Attempted to reach patient via phone, unsucessful. Left detailed message regarding Dr. García Mendes' note and to return call if any questions.

## 2022-02-17 DIAGNOSIS — I25.10 CORONARY ARTERY DISEASE INVOLVING NATIVE CORONARY ARTERY OF NATIVE HEART WITHOUT ANGINA PECTORIS: ICD-10-CM

## 2022-02-17 RX ORDER — FELODIPINE 10 MG/1
TABLET, EXTENDED RELEASE ORAL
Qty: 90 TABLET | Refills: 3 | Status: SHIPPED | OUTPATIENT
Start: 2022-02-17

## 2022-02-24 ENCOUNTER — TELEPHONE (OUTPATIENT)
Dept: INTERNAL MEDICINE CLINIC | Age: 87
End: 2022-02-24

## 2022-02-24 NOTE — TELEPHONE ENCOUNTER
Wife called. Patient seen by ophthalmologist today and was advised to stop donepezil x 3 weeks. Wife was not present during visit. Wife is asking what affects could medication have on eyes. Until she hears back patient will continue with medication. Advised to contact eye doctor to ask why patient should stop medication. Wife will call back with reason.

## 2022-02-25 DIAGNOSIS — G31.84 MCI (MILD COGNITIVE IMPAIRMENT) WITH MEMORY LOSS: ICD-10-CM

## 2022-02-25 RX ORDER — DONEPEZIL HYDROCHLORIDE 10 MG/1
TABLET, FILM COATED ORAL
Qty: 90 TABLET | Refills: 3 | Status: SHIPPED | OUTPATIENT
Start: 2022-02-25 | End: 2022-04-06 | Stop reason: SDUPTHER

## 2022-03-18 PROBLEM — I25.2 HISTORY OF ST ELEVATION MYOCARDIAL INFARCTION (STEMI): Status: ACTIVE | Noted: 2019-04-29

## 2022-03-19 PROBLEM — G31.84 MCI (MILD COGNITIVE IMPAIRMENT) WITH MEMORY LOSS: Status: ACTIVE | Noted: 2021-06-28

## 2022-03-19 PROBLEM — Z91.030 BEE STING ALLERGY: Status: ACTIVE | Noted: 2017-07-25

## 2022-03-19 PROBLEM — I25.10 CORONARY ARTERY DISEASE INVOLVING NATIVE CORONARY ARTERY OF NATIVE HEART: Status: ACTIVE | Noted: 2019-08-21

## 2022-03-31 ENCOUNTER — OFFICE VISIT (OUTPATIENT)
Dept: CARDIOLOGY CLINIC | Age: 87
End: 2022-03-31
Payer: MEDICARE

## 2022-03-31 VITALS
SYSTOLIC BLOOD PRESSURE: 136 MMHG | OXYGEN SATURATION: 98 % | WEIGHT: 165 LBS | BODY MASS INDEX: 25.9 KG/M2 | DIASTOLIC BLOOD PRESSURE: 70 MMHG | HEART RATE: 73 BPM | HEIGHT: 67 IN | RESPIRATION RATE: 16 BRPM

## 2022-03-31 DIAGNOSIS — I10 ESSENTIAL HYPERTENSION: ICD-10-CM

## 2022-03-31 DIAGNOSIS — I25.10 CORONARY ARTERY DISEASE INVOLVING NATIVE CORONARY ARTERY OF NATIVE HEART WITHOUT ANGINA PECTORIS: Primary | ICD-10-CM

## 2022-03-31 DIAGNOSIS — G31.84 MCI (MILD COGNITIVE IMPAIRMENT) WITH MEMORY LOSS: ICD-10-CM

## 2022-03-31 DIAGNOSIS — R00.1 BRADYCARDIA: ICD-10-CM

## 2022-03-31 DIAGNOSIS — E78.00 HYPERCHOLESTEREMIA: ICD-10-CM

## 2022-03-31 PROCEDURE — G8536 NO DOC ELDER MAL SCRN: HCPCS | Performed by: SPECIALIST

## 2022-03-31 PROCEDURE — G8427 DOCREV CUR MEDS BY ELIG CLIN: HCPCS | Performed by: SPECIALIST

## 2022-03-31 PROCEDURE — G8752 SYS BP LESS 140: HCPCS | Performed by: SPECIALIST

## 2022-03-31 PROCEDURE — 99214 OFFICE O/P EST MOD 30 MIN: CPT | Performed by: SPECIALIST

## 2022-03-31 PROCEDURE — G8419 CALC BMI OUT NRM PARAM NOF/U: HCPCS | Performed by: SPECIALIST

## 2022-03-31 PROCEDURE — G8754 DIAS BP LESS 90: HCPCS | Performed by: SPECIALIST

## 2022-03-31 PROCEDURE — 1101F PT FALLS ASSESS-DOCD LE1/YR: CPT | Performed by: SPECIALIST

## 2022-03-31 PROCEDURE — G8510 SCR DEP NEG, NO PLAN REQD: HCPCS | Performed by: SPECIALIST

## 2022-03-31 NOTE — PROGRESS NOTES
Josefa Yip     [birth year]       Janeth Elaine MD, [facility]  Date of Visit-3/31/2022   PCP is Gely Polo MD   [facility] [place]  [facility]  HPI:  Josefa Yip is a 81 y.o. male   · CAD-STEMI 4/23/2019 with stent to the LAD with an EF of 50% totally occluded LAD and the circumflex was stented troponin went to 80  · Cardiomyopathy, ischemic--improved--echo 9/30/2021 EF 6065% mild MR MAC and AI  · Bradycardia-has been on beta-blocker, 48-hour loop show heart rate of 39, metoprolol was stopped  · Hypertension--metoprolol felodipine and HCTZ for hypertension  · Cognitive changes  · Holter  It shows sinus bradycardia to sinus tachycardia with an average heart rate of 67. There are no periods of pauses. The maximal heart rate is 119 and the minimal heart rate 49. There were 1388 PVCs with a burden of 0.71%. There are 513 P SVC's with a burden of 0.26%. There were 9 occurrences of SVT longest at 7 beats. Overall the monitor shows some ectopy but otherwise benign there is 24% burden of bradycardia but not lower than 49. Date of monitoring is September 17 December 19, 2021    Today. .. Since last visit is seen Dr. Javid Anderson for cystitis Medicare wellness visit and also seeing orthopedics for lumbar stenosis and Dr. Analilia Granger for open-angle glaucoma entries reviewed in epic. Had lab work in December which is reviewed for lipids CBC CMP all in range  Denies chest pain, edema, syncope or shortness of breath at rest   Has no tachycardia , palpitations or sense of arrythmia    Feels generally pretty well. He is no longer taking Myrbetriq or the metoprolol as confirmed on the list.  They will they have small piece of tattered paper so we will give them a printed copy today. His wife is with him they seem to be doing well.     He is really asymptomatic at this point   I talked about stress testing which I would tend to defer at his age unless he had symptoms    Records  Echo 4/24/19 EF 50-55% MAC   12/7/2020 hx ACS Sent LAD 4/23/19 reviewed   Assessment/Plan:     Patient Instructions   We will see you back for an annual follow up with an echocardiogram.       1 . Coronary artery disease involving native coronary artery of native heart without angina pectoris  Prior STEMI 4/23/19 at the LAD. Has no current pain. EF is normal he has no symptoms I do not feel the need for stress test unless we get symptomatic especially at age 81. The EF was about 50 so is only a mild cardiomyopathy will follow up with an echo at the next visit since it will be about 18 months since last one  I will now start seeing him yearly sooner if there is any symptom    2. Bradycardia  off metoprolol and has a stable rate     3. Essential hypertension  Well controlled. Doing well now on 2 BP med s, chemistry was normal  At goal , meds and possible side effects reviewed and patient denies  Key CAD CHF Meds             felodipine (PLENDIL SR) 10 mg 24 hr tablet (Taking) TAKE 1 TABLET EVERY DAY    hydroCHLOROthiazide (HYDRODIURIL) 12.5 mg tablet (Taking) TAKE 1 TABLET EVERY DAY    rosuvastatin (CRESTOR) 5 mg tablet (Taking) Take 5 mg by mouth daily. aspirin 81 mg chewable tablet (Taking) Take 1 Tab by mouth daily. metoprolol tartrate (LOPRESSOR) 25 mg tablet Take 25 mg by mouth daily. BP Readings from Last 6 Encounters:   03/31/22 136/70   12/29/21 127/65   12/03/21 130/76   09/30/21 132/78   09/30/21 120/60   09/09/21 132/78        4. XOL  Lipids on high potency statin as appropriate for secondary prevention. At goal , denies excess muscle aches or new liver issues  Key Antihyperlipidemia Meds             rosuvastatin (CRESTOR) 5 mg tablet (Taking) Take 5 mg by mouth daily. Lab Results   Component Value Date/Time    LDL, calculated 61.4 12/03/2021 01:38 PM       5.  MCI (mild cognitive impairment) with memory loss  stable follows with Dr. Nadine Hernandez regularly and takes Aricept           Impression:   1. Coronary artery disease involving native coronary artery of native heart without angina pectoris    2. Bradycardia    3. Essential hypertension    4. Hypercholesteremia    5. MCI (mild cognitive impairment) with memory loss       Cardiac History:   monitor  It shows sinus bradycardia to sinus tachycardia with an average heart rate of 67. There are no periods of pauses. The maximal heart rate is 119 and the minimal heart rate 49. There were 1388 PVCs with a burden of 0.71%. There are 513 P SVC's with a burden of 0.26%. There were 9 occurrences of SVT longest at 7 beats. Overall the monitor shows some ectopy but otherwise benign there is 24% burden of bradycardia but not lower than 49. Date of monitoring is September 17 December 19, 2021     Future Appointments   Date Time Provider Valentino Patria   6/3/2022 10:15 AM Charles Rubi MD [facility] AMB        ROS-except as noted above. . A complete cardiac and respiratory are reviewed and negative except as above ; Resp-denies wheezing  or productive cough,. Const- No unusual weight loss or fever; Neuro-no recent seizure or CVA ; GI- No BRBPR, abdom pain, bloating ; - no  hematuria   see supplement sheet, initialed and to be scanned by staff  Past Medical History:   Diagnosis Date    BPH (benign prostatic hypertrophy)     CAD (coronary artery disease)     Cataract, bilateral 04/06/2016    s/p surgery in 2016    Hypertension     Insomnia 2/11/2015    Shingles       Social Hx= reports that he has quit smoking. He has never used smokeless tobacco. He reports current alcohol use of about 5.0 - 7.0 standard drinks of alcohol per week. He reports that he does not use drugs.      Exam and Labs:  /70 (BP 1 Location: Right arm, BP Patient Position: Sitting, BP Cuff Size: Adult)   Pulse 73   Resp 16   Ht 5' 7\" (1.702 m)   Wt 165 lb (74.8 kg)   SpO2 98%   BMI 25.84 kg/m² Constitutional:  NAD, comfortable  Head: NC,AT. Eyes: No scleral icterus. Neck:  Neck supple. No JVD present. Throat: moist mucous membranes. Chest: Effort normal & normal respiratory excursion . Neurological: alert, conversant and oriented . Skin: Skin is not cold. No obvious systemic rash noted. Not diaphoretic. No erythema. Psychiatric:  Grossly normal mood and affect. Behavior appears normal. Extremities:  no clubbing or cyanosis. Abdomen: non distended    Lungs:breath sounds normal. No stridor. distress, wheezes or  Rales. Heart: normal rate, regular rhythm, normal S1, S2, no murmurs, rubs, clicks or gallops , PMI non displaced. Edema: Edema is none.   Lab Results   Component Value Date/Time    Cholesterol, total 168 12/03/2021 01:38 PM    HDL Cholesterol 86 12/03/2021 01:38 PM    LDL, calculated 61.4 12/03/2021 01:38 PM    Triglyceride 103 12/03/2021 01:38 PM    CHOL/HDL Ratio 2.0 12/03/2021 01:38 PM     Lab Results   Component Value Date/Time    Sodium 140 12/03/2021 01:38 PM    Potassium 4.6 12/03/2021 01:38 PM    Chloride 108 12/03/2021 01:38 PM    CO2 26 12/03/2021 01:38 PM    Anion gap 6 12/03/2021 01:38 PM    Glucose 109 (H) 12/03/2021 01:38 PM    BUN 14 12/03/2021 01:38 PM    Creatinine 0.85 12/03/2021 01:38 PM    BUN/Creatinine ratio 16 12/03/2021 01:38 PM    GFR est AA >60 12/03/2021 01:38 PM    GFR est non-AA >60 12/03/2021 01:38 PM    Calcium 9.1 12/03/2021 01:38 PM      Wt Readings from Last 3 Encounters:   03/31/22 165 lb (74.8 kg)   12/29/21 167 lb (75.8 kg)   12/03/21 168 lb (76.2 kg)      BP Readings from Last 3 Encounters:   03/31/22 136/70   12/29/21 127/65   12/03/21 130/76      Current Outpatient Medications   Medication Sig    donepeziL (ARICEPT) 10 mg tablet TAKE 1 TABLET BY MOUTH EVERY DAY AT NIGHT    felodipine (PLENDIL SR) 10 mg 24 hr tablet TAKE 1 TABLET EVERY DAY    hydroCHLOROthiazide (HYDRODIURIL) 12.5 mg tablet TAKE 1 TABLET EVERY DAY    rosuvastatin (CRESTOR) 5 mg tablet Take 5 mg by mouth daily.    diclofenac (Voltaren) 1 % gel Apply  to affected area daily.  aspirin 81 mg chewable tablet Take 1 Tab by mouth daily.  MELATONIN PO Take 3 mg by mouth nightly as needed.  finasteride (PROSCAR) 5 mg tablet Take 5 mg by mouth daily.  multivitamins-minerals-lutein (CENTRUM SILVER) Tab Take 1 Tab by mouth two (2) times a week. No current facility-administered medications for this visit. Impression see above.

## 2022-03-31 NOTE — Clinical Note
3/31/2022    Patient: Karey Christensen   YOB: 1935   Date of Visit: 3/31/2022     Elva Correa, [address] [facility]  [address] [place] [address]  [place]    Dear Elva Correa MD,      Thank you for referring Mr. Yong Billingsley to [facility] for evaluation. My notes for this consultation are attached. If you have questions, please do not hesitate to call me. I look forward to following your patient along with you.       Sincerely,    Kush Power MD

## 2022-04-06 DIAGNOSIS — G31.84 MCI (MILD COGNITIVE IMPAIRMENT) WITH MEMORY LOSS: ICD-10-CM

## 2022-04-06 RX ORDER — FINASTERIDE 5 MG/1
5 TABLET, FILM COATED ORAL DAILY
Qty: 30 TABLET | Refills: 2 | Status: SHIPPED | OUTPATIENT
Start: 2022-04-06 | End: 2022-04-06 | Stop reason: SDUPTHER

## 2022-04-06 RX ORDER — DONEPEZIL HYDROCHLORIDE 10 MG/1
10 TABLET, FILM COATED ORAL
Qty: 90 TABLET | Refills: 3 | Status: SHIPPED | OUTPATIENT
Start: 2022-04-06

## 2022-04-06 RX ORDER — FINASTERIDE 5 MG/1
5 TABLET, FILM COATED ORAL DAILY
Qty: 90 TABLET | Refills: 3 | Status: SHIPPED | OUTPATIENT
Start: 2022-04-06

## 2022-04-06 NOTE — TELEPHONE ENCOUNTER
Requested Prescriptions     Pending Prescriptions Disp Refills    finasteride (PROSCAR) 5 mg tablet       Sig: Take 1 Tablet by mouth daily.  donepeziL (ARICEPT) 10 mg tablet 90 Tablet 3     Sig: Take 1 Tablet by mouth nightly.      [facility], [address] [place] [address]

## 2022-05-01 DIAGNOSIS — I10 ESSENTIAL HYPERTENSION: ICD-10-CM

## 2022-05-01 RX ORDER — HYDROCHLOROTHIAZIDE 12.5 MG/1
TABLET ORAL
Qty: 90 TABLET | Refills: 1 | Status: SHIPPED | OUTPATIENT
Start: 2022-05-01

## 2022-05-02 NOTE — TELEPHONE ENCOUNTER
Future Appointments   Date Time Provider Valentino Patria   6/3/2022 10:15 AM MD ALEX Rashid BS AMB   3/28/2023 10:00 AM BRITTNY FISHER BS AMB   3/28/2023 10:40 AM Janeth Elaine MD [facility] AMB

## 2022-06-03 ENCOUNTER — OFFICE VISIT (OUTPATIENT)
Dept: INTERNAL MEDICINE CLINIC | Age: 87
End: 2022-06-03
Payer: MEDICARE

## 2022-06-03 VITALS
WEIGHT: 163.8 LBS | BODY MASS INDEX: 25.71 KG/M2 | SYSTOLIC BLOOD PRESSURE: 140 MMHG | HEART RATE: 56 BPM | DIASTOLIC BLOOD PRESSURE: 72 MMHG | OXYGEN SATURATION: 95 % | RESPIRATION RATE: 16 BRPM | TEMPERATURE: 97.4 F | HEIGHT: 67 IN

## 2022-06-03 DIAGNOSIS — R35.1 BPH ASSOCIATED WITH NOCTURIA: ICD-10-CM

## 2022-06-03 DIAGNOSIS — G31.84 MCI (MILD COGNITIVE IMPAIRMENT) WITH MEMORY LOSS: ICD-10-CM

## 2022-06-03 DIAGNOSIS — M54.42 CHRONIC LEFT-SIDED LOW BACK PAIN WITH LEFT-SIDED SCIATICA: ICD-10-CM

## 2022-06-03 DIAGNOSIS — N40.1 BPH ASSOCIATED WITH NOCTURIA: ICD-10-CM

## 2022-06-03 DIAGNOSIS — G89.29 CHRONIC LEFT-SIDED LOW BACK PAIN WITH LEFT-SIDED SCIATICA: ICD-10-CM

## 2022-06-03 DIAGNOSIS — I10 PRIMARY HYPERTENSION: ICD-10-CM

## 2022-06-03 DIAGNOSIS — I25.10 CORONARY ARTERY DISEASE INVOLVING NATIVE CORONARY ARTERY OF NATIVE HEART WITHOUT ANGINA PECTORIS: Primary | ICD-10-CM

## 2022-06-03 PROCEDURE — G8536 NO DOC ELDER MAL SCRN: HCPCS | Performed by: INTERNAL MEDICINE

## 2022-06-03 PROCEDURE — 1101F PT FALLS ASSESS-DOCD LE1/YR: CPT | Performed by: INTERNAL MEDICINE

## 2022-06-03 PROCEDURE — G8510 SCR DEP NEG, NO PLAN REQD: HCPCS | Performed by: INTERNAL MEDICINE

## 2022-06-03 PROCEDURE — G8753 SYS BP > OR = 140: HCPCS | Performed by: INTERNAL MEDICINE

## 2022-06-03 PROCEDURE — 99214 OFFICE O/P EST MOD 30 MIN: CPT | Performed by: INTERNAL MEDICINE

## 2022-06-03 PROCEDURE — G8417 CALC BMI ABV UP PARAM F/U: HCPCS | Performed by: INTERNAL MEDICINE

## 2022-06-03 PROCEDURE — G8754 DIAS BP LESS 90: HCPCS | Performed by: INTERNAL MEDICINE

## 2022-06-03 PROCEDURE — G8427 DOCREV CUR MEDS BY ELIG CLIN: HCPCS | Performed by: INTERNAL MEDICINE

## 2022-06-03 NOTE — ASSESSMENT & PLAN NOTE
Asymptomatic, monitored by specialist, lipids at goal, BP just above goal today, is normally acceptable, unclear if related to increase in salt in the diet, encouraged diet changes and to check amb BP

## 2022-06-03 NOTE — PROGRESS NOTES
Ray Arteverett is a 81 y.o. male who was seen in clinic today (6/3/2022). Assessment & Plan:   Below is the assessment and plan developed based on review of pertinent history, physical exam, labs, studies, and medications. 1. Coronary artery disease involving native coronary artery of native heart without angina pectoris  Assessment & Plan:  Asymptomatic, monitored by specialist, lipids at goal, BP just above goal today, is normally acceptable, unclear if related to increase in salt in the diet, encouraged diet changes and to check amb BP   Orders:  -     METABOLIC PANEL, COMPREHENSIVE; Future  2. Primary hypertension  Assessment & Plan:  Normally well controlled, just high today, not sure if salt or pain related, no changes to medications, encouraged to check amb BP and update me if does not improve   Orders:  -     METABOLIC PANEL, COMPREHENSIVE; Future  3. BPH associated with nocturia  Assessment & Plan:  Stable to slightly worse, wife will verify if he is taking Rapaflo, if not he will restart, if he is then to urology to discuss options   4. MCI (mild cognitive impairment) with memory loss  Assessment & Plan:  Stable per wife, MMSE decreased from 28 to 22, will keep as MCI but repeat at next visit and will change to dementia if lower again, no med changes   5. Chronic left-sided low back pain with left-sided sciatica  Assessment & Plan:  Chronic issue, worsening, MRI from '21 reviewed. Did see ortho in March '22 but didn't return . He reports PT didn't help. Encouraged him to follow up with ortho to review options    Follow-up and Dispositions    · Return in about 6 months (around 12/3/2022) for FULL PHYSICAL - 30 minutes. Subjective/Objective:   Booker Espinoza was seen today for Hypertension      Since last visit: left sided sciatica is worse    Cardiovascular Review  The patient has hypertension and coronary artery disease.   He reports taking medications as instructed, no medication side effects noted, patient does not perform home BP monitoring. He generally follows a low fat low cholesterol diet, generally follows a low sodium diet, exercises sporadically. He is not exercising due to his back.      Neurological Review  He is here today to talk about MCI. He reports no changes. Wife reports no changes. He is taking medications as instructed and no medication side effects noted. Urology Review  He is here today because of BPH. His symptoms include: nocturia x 2-3, urinary hesitancy, rare leakage. He is on the following medications: Proscar (finasteride). He is not sure if he is taking the Rapaflo. He reports the following medication side effects: none. Prior to Admission medications    Medication Sig Start Date End Date Taking? Authorizing Provider   hydroCHLOROthiazide (HYDRODIURIL) 12.5 mg tablet TAKE 1 TABLET EVERY DAY 5/1/22  Yes Katiana Morataya MD   donepeziL (ARICEPT) 10 mg tablet Take 1 Tablet by mouth nightly. 4/6/22  Yes Katinaa Morataya MD   finasteride (PROSCAR) 5 mg tablet Take 1 Tablet by mouth daily. 4/6/22  Yes Katiana Morataya MD   felodipine (PLENDIL SR) 10 mg 24 hr tablet TAKE 1 TABLET EVERY DAY 2/17/22  Yes Katiana Morataya MD   rosuvastatin (CRESTOR) 5 mg tablet Take 5 mg by mouth daily. 8/20/20  Yes Provider, Historical   diclofenac (Voltaren) 1 % gel Apply  to affected area daily. Yes Provider, Historical   aspirin 81 mg chewable tablet Take 1 Tab by mouth daily. 4/25/19  Yes Jayne Tucker MD   MELATONIN PO Take 3 mg by mouth nightly as needed. Yes Provider, Historical   multivitamins-minerals-lutein (CENTRUM SILVER) Tab Take 1 Tab by mouth two (2) times a week. Yes Provider, Historical        Review of Systems   Constitutional: Negative for malaise/fatigue and weight loss. Respiratory: Negative for cough and shortness of breath. Cardiovascular: Negative for chest pain, palpitations and leg swelling.    Gastrointestinal: Negative for abdominal pain, constipation, diarrhea, heartburn, nausea and vomiting. Musculoskeletal: Positive for back pain. Negative for joint pain and myalgias. Skin: Negative for rash. Neurological: Negative for dizziness and headaches. Psychiatric/Behavioral: Positive for memory loss. Negative for depression. The patient is not nervous/anxious and does not have insomnia. Physical Exam  Constitutional:       General: He is not in acute distress. Appearance: Normal appearance. Eyes:      Conjunctiva/sclera: Conjunctivae normal.   Cardiovascular:      Rate and Rhythm: Regular rhythm. Bradycardia present. Heart sounds: No murmur heard. Pulmonary:      Effort: Pulmonary effort is normal.      Breath sounds: Normal breath sounds. No decreased breath sounds or wheezing. Abdominal:      General: Bowel sounds are normal.      Palpations: Abdomen is soft. Tenderness: There is no abdominal tenderness. Musculoskeletal:      Right lower leg: No edema. Left lower leg: No edema.    Neurological:      Comments: Walking with a cane, appears steady   Psychiatric:         Mood and Affect: Mood and affect normal.       Visit Vitals  BP (!) 140/72 (BP 1 Location: Right arm, BP Patient Position: Sitting, BP Cuff Size: Adult)   Pulse (!) 56   Temp 97.4 °F (36.3 °C) (Temporal)   Resp 16   Ht 5' 7\" (1.702 m)   Wt 163 lb 12.8 oz (74.3 kg)   SpO2 95%   BMI 25.65 kg/m²       Abiodun Shaver MD

## 2022-06-03 NOTE — ASSESSMENT & PLAN NOTE
Normally well controlled, just high today, not sure if salt or pain related, no changes to medications, encouraged to check amb BP and update me if does not improve

## 2022-06-03 NOTE — ASSESSMENT & PLAN NOTE
Stable to slightly worse, wife will verify if he is taking Rapaflo, if not he will restart, if he is then to urology to discuss options

## 2022-06-03 NOTE — Clinical Note
Call pt's wife. They saw Dr Rukhsana Mcdaniel (ortho) in March regarding his back/sciatica. LUPILLO reviewed. I would recommend following up with ortho to discuss options.

## 2022-06-03 NOTE — ASSESSMENT & PLAN NOTE
Chronic issue, worsening, MRI from '21 reviewed. Did see ortho in March '22 but didn't return . He reports PT didn't help.   Encouraged him to follow up with ortho to review options

## 2022-06-03 NOTE — ASSESSMENT & PLAN NOTE
Stable per wife, MMSE decreased from 28 to 25, will keep as MCI but repeat at next visit and will change to dementia if lower again, no med changes

## 2022-06-07 NOTE — PROGRESS NOTES
Pt's wife will call for an appt. With Dr Bob Cordoba. Pt's wife had a family meeting the other night and they all want Dr Clara Moulton to start another memory drug now.

## 2022-06-16 ENCOUNTER — TRANSCRIBE ORDER (OUTPATIENT)
Dept: SCHEDULING | Age: 87
End: 2022-06-16

## 2022-06-16 DIAGNOSIS — M43.16 SPONDYLOLISTHESIS, LUMBAR REGION: ICD-10-CM

## 2022-06-16 DIAGNOSIS — M54.16 LUMBAR RADICULOPATHY: Primary | ICD-10-CM

## 2022-06-16 DIAGNOSIS — M47.816 LUMBAR FACET ARTHROPATHY: ICD-10-CM

## 2022-06-16 DIAGNOSIS — M51.36 DDD (DEGENERATIVE DISC DISEASE), LUMBAR: ICD-10-CM

## 2022-06-16 DIAGNOSIS — M51.26 LUMBAR DISC HERNIATION: ICD-10-CM

## 2022-06-16 DIAGNOSIS — M48.061 SPINAL STENOSIS OF LUMBAR REGION, UNSPECIFIED WHETHER NEUROGENIC CLAUDICATION PRESENT: ICD-10-CM

## 2022-06-17 ENCOUNTER — TELEPHONE (OUTPATIENT)
Dept: INTERNAL MEDICINE CLINIC | Age: 87
End: 2022-06-17

## 2022-06-17 NOTE — TELEPHONE ENCOUNTER
Reason for call:  Pt. Wife would like to speak to [facility] again regarding memory medication.     Is this a new problem: yes     Date of last appointment:  6/3/2022     Can we respond via [employer]: no    Best call back number: [phone] Jackie Hughes

## 2022-06-29 NOTE — TELEPHONE ENCOUNTER
[phone]      The patient can not get in touch wit Dr Francine Lozano.  Please recommend someone else The patient said his blood pressure is still a little high 24

## 2022-07-03 ENCOUNTER — HOSPITAL ENCOUNTER (OUTPATIENT)
Dept: MRI IMAGING | Age: 87
Discharge: HOME | End: 2022-07-03
Attending: ORTHOPAEDIC SURGERY
Payer: MEDICARE

## 2022-07-03 DIAGNOSIS — M43.16 SPONDYLOLISTHESIS, LUMBAR REGION: ICD-10-CM

## 2022-07-03 DIAGNOSIS — M51.26 LUMBAR DISC HERNIATION: ICD-10-CM

## 2022-07-03 DIAGNOSIS — M47.816 LUMBAR FACET ARTHROPATHY: ICD-10-CM

## 2022-07-03 DIAGNOSIS — M48.061 SPINAL STENOSIS OF LUMBAR REGION, UNSPECIFIED WHETHER NEUROGENIC CLAUDICATION PRESENT: ICD-10-CM

## 2022-07-03 DIAGNOSIS — M54.16 LUMBAR RADICULOPATHY: ICD-10-CM

## 2022-07-03 DIAGNOSIS — M51.36 DDD (DEGENERATIVE DISC DISEASE), LUMBAR: ICD-10-CM

## 2022-07-03 PROCEDURE — 72148 MRI LUMBAR SPINE W/O DYE: CPT

## 2022-07-30 DIAGNOSIS — U07.1 COVID-19: Primary | ICD-10-CM

## 2022-07-30 NOTE — PROGRESS NOTES
On call--    Pt's wife calls with new COVID symptoms and positive test at home. Cough, sputum. No fever. Advised antiviral medication. Sent Molnupiravir to the pt's pharmacy.   If worsened, needs ED/Urgent care eval.

## 2022-10-27 DIAGNOSIS — G31.84 MCI (MILD COGNITIVE IMPAIRMENT) WITH MEMORY LOSS: ICD-10-CM

## 2022-10-28 RX ORDER — DONEPEZIL HYDROCHLORIDE 10 MG/1
10 TABLET, FILM COATED ORAL
Qty: 90 TABLET | Refills: 3 | OUTPATIENT
Start: 2022-10-28

## 2022-10-28 RX ORDER — ROSUVASTATIN CALCIUM 5 MG/1
5 TABLET, COATED ORAL
Qty: 90 TABLET | Refills: 1 | Status: SHIPPED | OUTPATIENT
Start: 2022-10-28

## 2022-10-28 NOTE — TELEPHONE ENCOUNTER
Requested Prescriptions     Signed Prescriptions Disp Refills    rosuvastatin (CRESTOR) 5 mg tablet 90 Tablet 1     Sig: Take 1 Tablet by mouth nightly. Authorizing Provider: Nessa Edward     Ordering User: Nemo CARRENO     Refused Prescriptions Disp Refills    donepeziL (ARICEPT) 10 mg tablet 90 Tablet 3     Sig: Take 1 Tablet by mouth nightly.      Refused By: Phong Loyd     Reason for Refusal: Not the prescriber of this medication     Verbal order per Dr. Christofer Thakur.     Future Appointments   Date Time Provider Valentino Patria   12/20/2022  2:00 PM MD ALEX Stearns BS AMB   3/28/2023 10:00 AM BRITTNY FISHER BS AMB   3/28/2023 10:40 AM Janeth Elaine MD [facility] AMB

## 2022-12-08 DIAGNOSIS — I25.10 CORONARY ARTERY DISEASE INVOLVING NATIVE CORONARY ARTERY OF NATIVE HEART WITHOUT ANGINA PECTORIS: ICD-10-CM

## 2022-12-08 DIAGNOSIS — I10 ESSENTIAL HYPERTENSION: ICD-10-CM

## 2022-12-08 RX ORDER — FELODIPINE 10 MG/1
TABLET, EXTENDED RELEASE ORAL
Qty: 90 TABLET | Refills: 1 | Status: SHIPPED | OUTPATIENT
Start: 2022-12-08

## 2022-12-08 RX ORDER — HYDROCHLOROTHIAZIDE 12.5 MG/1
TABLET ORAL
Qty: 90 TABLET | Refills: 1 | Status: SHIPPED | OUTPATIENT
Start: 2022-12-08

## 2022-12-08 NOTE — TELEPHONE ENCOUNTER
Future Appointments   Date Time Provider Valentino Patria   12/20/2022  2:00 PM Jaxon Baron MD [facility] AMB   3/28/2023 10:00 AM BRITTNY FISHER BS AMB   3/28/2023 10:40 AM Janeth Elaine MD [facility] BS AMB

## 2022-12-20 ENCOUNTER — OFFICE VISIT (OUTPATIENT)
Dept: INTERNAL MEDICINE CLINIC | Age: 87
End: 2022-12-20
Payer: MEDICARE

## 2022-12-20 VITALS
HEART RATE: 60 BPM | BODY MASS INDEX: 26.37 KG/M2 | TEMPERATURE: 98.2 F | RESPIRATION RATE: 18 BRPM | HEIGHT: 67 IN | DIASTOLIC BLOOD PRESSURE: 74 MMHG | SYSTOLIC BLOOD PRESSURE: 134 MMHG | OXYGEN SATURATION: 98 % | WEIGHT: 168 LBS

## 2022-12-20 DIAGNOSIS — I10 PRIMARY HYPERTENSION: ICD-10-CM

## 2022-12-20 DIAGNOSIS — Z71.89 ADVANCED CARE PLANNING/COUNSELING DISCUSSION: ICD-10-CM

## 2022-12-20 DIAGNOSIS — Z00.00 MEDICARE ANNUAL WELLNESS VISIT, SUBSEQUENT: Primary | ICD-10-CM

## 2022-12-20 DIAGNOSIS — G31.84 MCI (MILD COGNITIVE IMPAIRMENT) WITH MEMORY LOSS: ICD-10-CM

## 2022-12-20 DIAGNOSIS — I25.10 CORONARY ARTERY DISEASE INVOLVING NATIVE CORONARY ARTERY OF NATIVE HEART WITHOUT ANGINA PECTORIS: ICD-10-CM

## 2022-12-20 PROCEDURE — G8536 NO DOC ELDER MAL SCRN: HCPCS | Performed by: INTERNAL MEDICINE

## 2022-12-20 PROCEDURE — G8417 CALC BMI ABV UP PARAM F/U: HCPCS | Performed by: INTERNAL MEDICINE

## 2022-12-20 PROCEDURE — G8427 DOCREV CUR MEDS BY ELIG CLIN: HCPCS | Performed by: INTERNAL MEDICINE

## 2022-12-20 PROCEDURE — 1101F PT FALLS ASSESS-DOCD LE1/YR: CPT | Performed by: INTERNAL MEDICINE

## 2022-12-20 PROCEDURE — G8510 SCR DEP NEG, NO PLAN REQD: HCPCS | Performed by: INTERNAL MEDICINE

## 2022-12-20 PROCEDURE — G0439 PPPS, SUBSEQ VISIT: HCPCS | Performed by: INTERNAL MEDICINE

## 2022-12-20 RX ORDER — GABAPENTIN 100 MG/1
100 CAPSULE ORAL 2 TIMES DAILY
COMMUNITY
Start: 2022-10-20

## 2022-12-20 RX ORDER — TIZANIDINE 2 MG/1
2 TABLET ORAL DAILY PRN
COMMUNITY
Start: 2022-11-17

## 2022-12-20 NOTE — PROGRESS NOTES
Familia Linda is a 81 y.o. male who was seen in clinic today (12/20/2022) for a full physical.   He RTC with his wife. Assessment & Plan:   Below is the assessment and plan developed based on review of pertinent history, physical exam, labs, studies, and medications. 1. Medicare annual wellness visit, subsequent  2. Advanced care planning/counseling discussion  3. Coronary artery disease involving native coronary artery of native heart without angina pectoris  Assessment & Plan:  Asymptomatic, monitored by specialist, lipids at goal, BP at goal, will defer to specialist  Orders:  -     METABOLIC PANEL, COMPREHENSIVE; Future  -     CBC W/O DIFF; Future  -     LIPID PANEL; Future  4. Primary hypertension  Assessment & Plan:  Improved, at goal, continue current treatment pending review of labs    5. MCI (mild cognitive impairment) with memory loss  Assessment & Plan:  Stable, reviewed expectations with medications, reviewed role of Namenda and when to consider, continue current life style changes     Follow-up and Dispositions    Return in about 6 months (around 6/20/2023), or if symptoms worsen or fail to improve. Subjective:   Steve Castellanos is here today for a full physical.      Annual Wellness Visit- Subsequent Visit    Since last visit: no changes    The following acute and/or chronic problems were addressed today:    Cardiovascular Review  The patient has hypertension and coronary artery disease. He reports taking medications as instructed, no medication side effects noted, patient does not perform home BP monitoring. He generally follows a low fat low cholesterol diet, generally follows a low sodium diet, exercises regularly. Neurological Review  He is here today to talk about MCI. In August dose was increased from 5mg to 10mg. Since then he reports no changes. His wife reports it is stable to slightly better.   Previously did not remember how to play a card game, but now he is playing again. He is taking medications as instructed and no medication side effects noted. End of Life Planning: This was discussed with him today and he has an advanced directive - a copy has been provided. Reviewed DNR/DNI and patient is interested in remaining a DNR, no changes made. Depression Screen:  3 most recent PHQ Screens 12/20/2022   Little interest or pleasure in doing things Not at all   Feeling down, depressed, irritable, or hopeless Not at all   Total Score PHQ 2 0   Trouble falling or staying asleep, or sleeping too much -   Feeling tired or having little energy -   Poor appetite, weight loss, or overeating -   Feeling bad about yourself - or that you are a failure or have let yourself or your family down -   Trouble concentrating on things such as school, work, reading, or watching TV -   Moving or speaking so slowly that other people could have noticed; or the opposite being so fidgety that others notice -   Thoughts of being better off dead, or hurting yourself in some way -   PHQ 9 Score -   How difficult have these problems made it for you to do your work, take care of your home and get along with others -         Fall Risk:   Fall Risk Assessment, last 12 mths 12/20/2022   Able to walk? Yes   Fall in past 12 months? 0   Do you feel unsteady? 0   Are you worried about falling 0   Is TUG test greater than 12 seconds? -   Is the gait abnormal? -   Number of falls in past 12 months -   Fall with injury? -       Abuse Screen:  Abuse Screening Questionnaire 12/20/2022   Do you ever feel afraid of your partner? N   Are you in a relationship with someone who physically or mentally threatens you? N   Is it safe for you to go home?  Y       Do you average more than 1 drink per night or more than 7 drinks a week: No    In the past three months have you have had more than 4 drinks containing alcohol on one occasion: No    Health Maintenance:   Daily Aspirin: yes  AAA Screening: n/a: aged out    Immunizations:   Covid: up to date   Influenza: up to date   Tetanus: up to date   Shingles: up to date   Pneumonia: up to date  Cancer screening:   Prostate: guidelines reviewed, n/a  Colon: guidelines reviewed, n/a    Functional Ability and Level of Safety:    Hearing: Hearing is good. The patient wears hearing aids. Cognition Screen:   Has your family/caregiver stated any concerns about your memory: yes  Cognitive Screen - MMSE - 30/30     Ambulation: with difficulty, uses a cane     Activities of Daily Living: The home contains: handrails and grab bars  Patient does total self care  Exercise: moderately active    Adult Nutrition Screen:  No risk factors noted. Patient Care Team:  Hiram Hills MD as PCP - General (Internal Medicine Physician)  Hiram Hills MD as PCP - [facility] Provider  Rita Guadalupe MD (Urology)  Jazmin Odom MD (Dermatology Physician)  Katiana Hancock MD as Physician (Ophthalmology)  Gillian Isaacs MD as Physician (Orthopedic Surgery)  Hiram Hills MD (Internal Medicine Physician)  Jenni Patel MD (Cardiovascular Disease Physician)       The following sections were reviewed & updated as appropriate: Problem List, Allergies, PMH, PSH, FH, and SH. Prior to Admission medications    Medication Sig Start Date End Date Taking? Authorizing Provider   gabapentin (NEURONTIN) 100 mg capsule Take 100 mg by mouth two (2) times a day. 10/20/22  Yes Provider, Historical   tiZANidine (ZANAFLEX) 2 mg tablet Take 2 mg by mouth daily as needed. 11/17/22  Yes Provider, Historical   felodipine (PLENDIL SR) 10 mg 24 hr tablet TAKE 1 TABLET EVERY DAY 12/8/22  Yes Hiram Hills MD   hydroCHLOROthiazide (HYDRODIURIL) 12.5 mg tablet TAKE 1 TABLET EVERY DAY 12/8/22  Yes Hiram Hills MD   rosuvastatin (CRESTOR) 5 mg tablet Take 1 Tablet by mouth nightly.  10/28/22  Yes Isabel Bose MD   donepeziL (ARICEPT) 10 mg tablet Take 1 Tablet by mouth nightly. 4/6/22  Yes José Medellin MD   finasteride (PROSCAR) 5 mg tablet Take 1 Tablet by mouth daily. 4/6/22  Yes José Medellin MD   diclofenac (VOLTAREN) 1 % gel Apply  to affected area daily. Yes Provider, Historical   aspirin 81 mg chewable tablet Take 1 Tab by mouth daily. 4/25/19  Yes Judith Newton MD   MELATONIN PO Take 3 mg by mouth nightly as needed. Yes Provider, Historical   multivitamins-minerals-lutein (MEN'S CENTRUM SILVER WITH LUTEIN) tab tablet Take 1 Tab by mouth two (2) times a week. Yes Provider, Historical          Review of Systems   Constitutional:  Negative for chills and fever. Respiratory:  Negative for cough and shortness of breath. Cardiovascular:  Negative for chest pain and palpitations. Gastrointestinal:  Negative for abdominal pain, blood in stool, constipation, diarrhea, heartburn, nausea and vomiting. Genitourinary:               Musculoskeletal:  Negative for joint pain and myalgias. Neurological:  Negative for tingling, focal weakness and headaches. Endo/Heme/Allergies:  Does not bruise/bleed easily. Psychiatric/Behavioral:  Negative for depression. The patient is not nervous/anxious and does not have insomnia. Objective:   Physical Exam  Constitutional:       General: He is not in acute distress. Eyes:      Conjunctiva/sclera: Conjunctivae normal.   Cardiovascular:      Rate and Rhythm: Regular rhythm. Heart sounds: No murmur heard. Pulmonary:      Effort: Pulmonary effort is normal.      Breath sounds: Normal breath sounds. No decreased breath sounds or wheezing. Abdominal:      General: Bowel sounds are normal.      Palpations: Abdomen is soft. There is no hepatomegaly or splenomegaly. Tenderness: There is no abdominal tenderness. Musculoskeletal:      Right lower leg: No edema. Left lower leg: No edema.    Psychiatric:         Mood and Affect: Mood and affect normal.         Behavior: Behavior normal.          Visit Vitals  /74   Pulse 60   Temp 98.2 °F (36.8 °C) (Temporal)   Resp 18   Ht 5' 7\" (1.702 m)   Wt 168 lb (76.2 kg)   SpO2 98%   BMI 26.31 kg/m²         Brandin Galvan MD

## 2022-12-20 NOTE — ASSESSMENT & PLAN NOTE
Stable, reviewed expectations with medications, reviewed role of Namenda and when to consider, continue current life style changes

## 2022-12-20 NOTE — ACP (ADVANCE CARE PLANNING)
Advance Care Planning   Advance Care Planning (ACP) Physician/NP/PA (Provider) Conversation    Date of ACP Conversation: 12/20/22  Persons included in Conversation:  patient and family  Length of ACP Conversation in minutes: <16 minutes (Non-Billable)    Authorized Decision Maker (if patient is incapable of making informed decisions):   Named in Advance Directive or Healthcare Power of       Primary Decision MakerPershiarsenio [facility] - [phone]    Secondary Decision Maker: Bernardaaugustine Jackie - Son    Secondary Decision Maker: Vishnu Haji - Son    He has an advanced directive - a copy has been provided & still reflects him wishes. Reviewed DNR/DNI and patient is interested in remaining a DNR, no changes made.        Linnea Samuel MD

## 2022-12-20 NOTE — PATIENT INSTRUCTIONS
Medicare Wellness Visit, Male    The best way to live healthy is to have a lifestyle where you eat a well-balanced diet, exercise regularly, limit alcohol use, and quit all forms of tobacco/nicotine, if applicable. Regular preventive services are another way to keep healthy. Preventive services (vaccines, screening tests, monitoring & exams) can help personalize your care plan, which helps you manage your own care. Screening tests can find health problems at the earliest stages, when they are easiest to treat. [facility] follows the current, evidence-based guidelines published by the [facility] ([facility]) when recommending preventive services for our patients. Because we follow these guidelines, sometimes recommendations change over time as research supports it. (For example, a prostate screening blood test is no longer routinely recommended for men with no symptoms). Of course, you and your doctor may decide to screen more often for some diseases, based on your risk and co-morbidities (chronic disease you are already diagnosed with). Preventive services for you include:  - Medicare offers their members a free annual wellness visit, which is time for you and your primary care provider to discuss and plan for your preventive service needs.  Take advantage of this benefit every year!    -Over the age of 72 should receive the recommended pneumonia vaccines.   -All adults should have a flu vaccine yearly.  -All adults should receive a tetanus vaccine every 10 years.  -Over the age of 48 should receive the shingles vaccines.    -All adults should be screened once for Hepatitis C.  -All adults age 38-68 who are overweight should have a diabetes screening test once every three years.   -Other screening tests & preventive services for persons with diabetes include: an eye exam to screen for diabetic retinopathy, a kidney function test, a foot exam, and stricter control over your cholesterol.   -Cardiovascular screening for adults with routine risk involves an electrocardiogram (ECG) at intervals determined by the provider.     -Colorectal cancer screening should be done for adults age 43-69 with no increased risk factors for colorectal cancer. There are a number of acceptable methods of screening for this type of cancer. Each test has its own benefits and drawbacks. Discuss with your provider what is most appropriate for you during your annual wellness visit. The different tests include: colonoscopy (considered the best screening method), a fecal occult blood test, a fecal DNA test, and sigmoidoscopy.    -Lung cancer screening is recommended annually with a low dose CT scan for adults between age 54 and 68, who have smoked at least 30 pack years (equivalent of 1 pack per day for 30 days), and who is a current smoker or quit less than 15 years ago. -An Abdominal Aortic Aneurysm (AAA) Screening is recommended for men age 73-68 who has ever smoked in their lifetime.      Here is a list of your current Health Maintenance items (your personalized list of preventive services) with a due date:  Health Maintenance Due   Topic Date Due    Cholesterol Test   12/03/2022

## 2022-12-21 LAB
ALBUMIN SERPL-MCNC: 3.6 G/DL (ref 3.5–5)
ALBUMIN/GLOB SERPL: 1.2 {RATIO} (ref 1.1–2.2)
ALP SERPL-CCNC: 75 U/L (ref 45–117)
ALT SERPL-CCNC: 17 U/L (ref 12–78)
ANION GAP SERPL CALC-SCNC: 6 MMOL/L (ref 5–15)
AST SERPL-CCNC: 15 U/L (ref 15–37)
BILIRUB SERPL-MCNC: 0.5 MG/DL (ref 0.2–1)
BUN SERPL-MCNC: 16 MG/DL (ref 6–20)
BUN/CREAT SERPL: 16 (ref 12–20)
CALCIUM SERPL-MCNC: 9.6 MG/DL (ref 8.5–10.1)
CHLORIDE SERPL-SCNC: 110 MMOL/L (ref 97–108)
CHOLEST SERPL-MCNC: 192 MG/DL
CO2 SERPL-SCNC: 28 MMOL/L (ref 21–32)
CREAT SERPL-MCNC: 0.97 MG/DL (ref 0.7–1.3)
ERYTHROCYTE [DISTWIDTH] IN BLOOD BY AUTOMATED COUNT: 12.8 % (ref 11.5–14.5)
GLOBULIN SER CALC-MCNC: 2.9 G/DL (ref 2–4)
GLUCOSE SERPL-MCNC: 103 MG/DL (ref 65–100)
HCT VFR BLD AUTO: 49.6 % (ref 36.6–50.3)
HDLC SERPL-MCNC: 65 MG/DL
HDLC SERPL: 3 {RATIO} (ref 0–5)
HGB BLD-MCNC: 15.5 G/DL (ref 12.1–17)
LDLC SERPL CALC-MCNC: 102.2 MG/DL (ref 0–100)
MCH RBC QN AUTO: 32.7 PG (ref 26–34)
MCHC RBC AUTO-ENTMCNC: 31.3 G/DL (ref 30–36.5)
MCV RBC AUTO: 104.6 FL (ref 80–99)
NRBC # BLD: 0 K/UL (ref 0–0.01)
NRBC BLD-RTO: 0 PER 100 WBC
PLATELET # BLD AUTO: 276 K/UL (ref 150–400)
PMV BLD AUTO: 10.7 FL (ref 8.9–12.9)
POTASSIUM SERPL-SCNC: 4.6 MMOL/L (ref 3.5–5.1)
PROT SERPL-MCNC: 6.5 G/DL (ref 6.4–8.2)
RBC # BLD AUTO: 4.74 M/UL (ref 4.1–5.7)
SODIUM SERPL-SCNC: 144 MMOL/L (ref 136–145)
TRIGL SERPL-MCNC: 124 MG/DL (ref ?–150)
VLDLC SERPL CALC-MCNC: 24.8 MG/DL
WBC # BLD AUTO: 8 K/UL (ref 4.1–11.1)

## 2022-12-21 NOTE — PROGRESS NOTES
Please call patient. All labs are stable or at goal except for worsening Lipids. Very compliance w/ Crestor as it is up significantly. Work on [employer]. Will defer to cardiology about any medication changes needed. Sugar was elevated but non-fasting so okay.

## 2023-05-04 ENCOUNTER — OFFICE VISIT (OUTPATIENT)
Dept: CARDIOLOGY CLINIC | Age: 88
End: 2023-05-04

## 2023-05-04 ENCOUNTER — ANCILLARY PROCEDURE (OUTPATIENT)
Dept: CARDIOLOGY CLINIC | Age: 88
End: 2023-05-04

## 2023-05-04 VITALS
OXYGEN SATURATION: 96 % | WEIGHT: 167 LBS | RESPIRATION RATE: 16 BRPM | SYSTOLIC BLOOD PRESSURE: 110 MMHG | HEART RATE: 46 BPM | DIASTOLIC BLOOD PRESSURE: 70 MMHG | HEIGHT: 67 IN | BODY MASS INDEX: 26.21 KG/M2

## 2023-05-04 VITALS
SYSTOLIC BLOOD PRESSURE: 134 MMHG | BODY MASS INDEX: 26.53 KG/M2 | WEIGHT: 169 LBS | DIASTOLIC BLOOD PRESSURE: 74 MMHG | HEIGHT: 67 IN

## 2023-05-04 DIAGNOSIS — R00.1 BRADYCARDIA: ICD-10-CM

## 2023-05-04 DIAGNOSIS — I25.10 CORONARY ARTERY DISEASE INVOLVING NATIVE CORONARY ARTERY OF NATIVE HEART WITHOUT ANGINA PECTORIS: Primary | ICD-10-CM

## 2023-05-04 DIAGNOSIS — I10 ESSENTIAL HYPERTENSION: ICD-10-CM

## 2023-05-04 DIAGNOSIS — I25.10 CORONARY ARTERY DISEASE INVOLVING NATIVE CORONARY ARTERY OF NATIVE HEART WITHOUT ANGINA PECTORIS: ICD-10-CM

## 2023-05-04 DIAGNOSIS — G31.84 MCI (MILD COGNITIVE IMPAIRMENT) WITH MEMORY LOSS: ICD-10-CM

## 2023-05-04 DIAGNOSIS — E78.00 HYPERCHOLESTEREMIA: ICD-10-CM

## 2023-05-04 LAB
ECHO AO ASC DIAM: 3.4 CM
ECHO AO ASCENDING AORTA INDEX: 1.81 CM/M2
ECHO AO ROOT DIAM: 3.2 CM
ECHO AO ROOT INDEX: 1.7 CM/M2
ECHO AV AREA PEAK VELOCITY: 2.9 CM2
ECHO AV AREA VTI: 2.8 CM2
ECHO AV AREA/BSA PEAK VELOCITY: 1.5 CM2/M2
ECHO AV AREA/BSA VTI: 1.5 CM2/M2
ECHO AV MEAN GRADIENT: 3 MMHG
ECHO AV MEAN VELOCITY: 0.8 M/S
ECHO AV PEAK GRADIENT: 5 MMHG
ECHO AV PEAK VELOCITY: 1.2 M/S
ECHO AV VELOCITY RATIO: 0.92
ECHO AV VTI: 25 CM
ECHO EST RA PRESSURE: 3 MMHG
ECHO LA DIAMETER INDEX: 1.49 CM/M2
ECHO LA DIAMETER: 2.8 CM
ECHO LA TO AORTIC ROOT RATIO: 0.88
ECHO LA VOL 2C: 44 ML (ref 18–58)
ECHO LA VOL 4C: 44 ML (ref 18–58)
ECHO LA VOL BP: 45 ML (ref 18–58)
ECHO LA VOL/BSA BIPLANE: 24 ML/M2 (ref 16–34)
ECHO LA VOLUME AREA LENGTH: 48 ML
ECHO LA VOLUME INDEX A2C: 23 ML/M2 (ref 16–34)
ECHO LA VOLUME INDEX A4C: 23 ML/M2 (ref 16–34)
ECHO LA VOLUME INDEX AREA LENGTH: 26 ML/M2 (ref 16–34)
ECHO LV E' LATERAL VELOCITY: 5 CM/S
ECHO LV E' SEPTAL VELOCITY: 5 CM/S
ECHO LV EDV A2C: 41 ML
ECHO LV EDV A4C: 53 ML
ECHO LV EDV BP: 46 ML (ref 67–155)
ECHO LV EDV INDEX A4C: 28 ML/M2
ECHO LV EDV INDEX BP: 24 ML/M2
ECHO LV EDV NDEX A2C: 22 ML/M2
ECHO LV EJECTION FRACTION A2C: 52 %
ECHO LV EJECTION FRACTION A4C: 70 %
ECHO LV EJECTION FRACTION BIPLANE: 61 % (ref 55–100)
ECHO LV ESV A2C: 19 ML
ECHO LV ESV A4C: 16 ML
ECHO LV ESV BP: 18 ML (ref 22–58)
ECHO LV ESV INDEX A2C: 10 ML/M2
ECHO LV ESV INDEX A4C: 9 ML/M2
ECHO LV ESV INDEX BP: 10 ML/M2
ECHO LV FRACTIONAL SHORTENING: 36 % (ref 28–44)
ECHO LV INTERNAL DIMENSION DIASTOLE INDEX: 1.91 CM/M2
ECHO LV INTERNAL DIMENSION DIASTOLIC: 3.6 CM (ref 4.2–5.9)
ECHO LV INTERNAL DIMENSION SYSTOLIC INDEX: 1.22 CM/M2
ECHO LV INTERNAL DIMENSION SYSTOLIC: 2.3 CM
ECHO LV IVSD: 1.2 CM (ref 0.6–1)
ECHO LV MASS 2D: 141.5 G (ref 88–224)
ECHO LV MASS INDEX 2D: 75.3 G/M2 (ref 49–115)
ECHO LV POSTERIOR WALL DIASTOLIC: 1.2 CM (ref 0.6–1)
ECHO LV RELATIVE WALL THICKNESS RATIO: 0.67
ECHO LVOT AREA: 3.1 CM2
ECHO LVOT AV VTI INDEX: 0.9
ECHO LVOT DIAM: 2 CM
ECHO LVOT MEAN GRADIENT: 2 MMHG
ECHO LVOT PEAK GRADIENT: 5 MMHG
ECHO LVOT PEAK VELOCITY: 1.1 M/S
ECHO LVOT STROKE VOLUME INDEX: 37.7 ML/M2
ECHO LVOT SV: 71 ML
ECHO LVOT VTI: 22.6 CM
ECHO MV A VELOCITY: 0.63 M/S
ECHO MV E DECELERATION TIME (DT): 285.5 MS
ECHO MV E VELOCITY: 0.45 M/S
ECHO MV E/A RATIO: 0.71
ECHO MV E/E' LATERAL: 9
ECHO MV E/E' RATIO (AVERAGED): 9
ECHO MV E/E' SEPTAL: 9
ECHO RIGHT VENTRICULAR SYSTOLIC PRESSURE (RVSP): 24 MMHG
ECHO RV BASAL DIMENSION: 3.4 CM
ECHO RV FREE WALL PEAK S': 12 CM/S
ECHO RV TAPSE: 2.6 CM (ref 1.7–?)
ECHO TV REGURGITANT MAX VELOCITY: 2.28 M/S
ECHO TV REGURGITANT PEAK GRADIENT: 21 MMHG

## 2023-05-28 DIAGNOSIS — I25.10 ATHEROSCLEROTIC HEART DISEASE OF NATIVE CORONARY ARTERY WITHOUT ANGINA PECTORIS: Primary | ICD-10-CM

## 2023-05-31 RX ORDER — ROSUVASTATIN CALCIUM 5 MG/1
TABLET, COATED ORAL
Qty: 90 TABLET | Refills: 3 | Status: SHIPPED | OUTPATIENT
Start: 2023-05-31

## 2023-05-31 NOTE — TELEPHONE ENCOUNTER
Per VO by MD.    Future Appointments   Date Time Provider [facility]   6/20/2023 10:20 AM MD FREDDIE Goff BS AMB   5/2/2024 10:20 AM MD CHRIS Banks BS AMB

## 2023-06-20 ENCOUNTER — OFFICE VISIT (OUTPATIENT)
Age: 88
End: 2023-06-20
Payer: COMMERCIAL

## 2023-06-20 VITALS
DIASTOLIC BLOOD PRESSURE: 76 MMHG | OXYGEN SATURATION: 98 % | WEIGHT: 172 LBS | BODY MASS INDEX: 27 KG/M2 | RESPIRATION RATE: 18 BRPM | HEART RATE: 60 BPM | HEIGHT: 67 IN | SYSTOLIC BLOOD PRESSURE: 134 MMHG

## 2023-06-20 DIAGNOSIS — I25.10 CORONARY ARTERY DISEASE INVOLVING NATIVE CORONARY ARTERY OF NATIVE HEART WITHOUT ANGINA PECTORIS: ICD-10-CM

## 2023-06-20 DIAGNOSIS — G31.84 MCI (MILD COGNITIVE IMPAIRMENT) WITH MEMORY LOSS: Primary | ICD-10-CM

## 2023-06-20 PROCEDURE — 99214 OFFICE O/P EST MOD 30 MIN: CPT | Performed by: INTERNAL MEDICINE

## 2023-06-20 PROCEDURE — 1123F ACP DISCUSS/DSCN MKR DOCD: CPT | Performed by: INTERNAL MEDICINE

## 2023-06-20 RX ORDER — ROSUVASTATIN CALCIUM 5 MG/1
5 TABLET, COATED ORAL NIGHTLY
Qty: 1 TABLET | Refills: 0
Start: 2023-06-20

## 2023-06-20 SDOH — ECONOMIC STABILITY: HOUSING INSECURITY
IN THE LAST 12 MONTHS, WAS THERE A TIME WHEN YOU DID NOT HAVE A STEADY PLACE TO SLEEP OR SLEPT IN A SHELTER (INCLUDING NOW)?: NO

## 2023-06-20 SDOH — ECONOMIC STABILITY: INCOME INSECURITY: HOW HARD IS IT FOR YOU TO PAY FOR THE VERY BASICS LIKE FOOD, HOUSING, MEDICAL CARE, AND HEATING?: NOT HARD AT ALL

## 2023-06-20 SDOH — ECONOMIC STABILITY: FOOD INSECURITY: WITHIN THE PAST 12 MONTHS, THE FOOD YOU BOUGHT JUST DIDN'T LAST AND YOU DIDN'T HAVE MONEY TO GET MORE.: NEVER TRUE

## 2023-06-20 SDOH — ECONOMIC STABILITY: FOOD INSECURITY: WITHIN THE PAST 12 MONTHS, YOU WORRIED THAT YOUR FOOD WOULD RUN OUT BEFORE YOU GOT MONEY TO BUY MORE.: NEVER TRUE

## 2023-06-20 ASSESSMENT — ENCOUNTER SYMPTOMS
ABDOMINAL PAIN: 0
VOMITING: 0
SHORTNESS OF BREATH: 0
BLOOD IN STOOL: 0
NAUSEA: 0
COUGH: 0
DIARRHEA: 0
CONSTIPATION: 0

## 2023-06-20 NOTE — ASSESSMENT & PLAN NOTE
well controlled, asymptomatic. BP is well controlled, lipids are well controlled. Continue: current plan. He is not sure he is taking Crestor. He will restart it if he did stop it. Cardiology notes reviewed and verified he was not supposed to stop it.

## 2023-06-20 NOTE — ASSESSMENT & PLAN NOTE
stable, continue current treatment, reviewed when to consider adding in Namenda but will defer for now. Continue w/ current routine.

## 2023-07-16 RX ORDER — HYDROCHLOROTHIAZIDE 12.5 MG/1
TABLET ORAL
Qty: 90 TABLET | Refills: 1 | Status: SHIPPED | OUTPATIENT
Start: 2023-07-16

## 2023-07-16 RX ORDER — FELODIPINE 10 MG/1
TABLET, EXTENDED RELEASE ORAL
Qty: 90 TABLET | Refills: 1 | Status: SHIPPED | OUTPATIENT
Start: 2023-07-16

## 2023-09-07 ENCOUNTER — TELEPHONE (OUTPATIENT)
Age: 88
End: 2023-09-07

## 2023-09-07 RX ORDER — DONEPEZIL HYDROCHLORIDE 10 MG/1
10 TABLET, FILM COATED ORAL NIGHTLY
Qty: 10 TABLET | Refills: 0 | Status: SHIPPED | OUTPATIENT
Start: 2023-09-07

## 2023-09-07 RX ORDER — DONEPEZIL HYDROCHLORIDE 10 MG/1
TABLET, FILM COATED ORAL
Qty: 90 TABLET | Refills: 3 | Status: SHIPPED | OUTPATIENT
Start: 2023-09-07 | End: 2023-09-07 | Stop reason: SDUPTHER

## 2023-09-07 NOTE — TELEPHONE ENCOUNTER
Reason for call:  TC [facility] [address], Wife. Wife on pt's PHI. Pt's id verified. Ms. Kirstie Hanson would like to speak with nurse re medication(s) that pt is on. Ms. Kirstie Hanson stated she is worried about pt's memory loss and feels it is getting worse.      Is this a new problem: No    Date of last appointment:  6/20/2023     Can we respond via [employer]: No    Best call back number: [phone]

## 2023-09-07 NOTE — TELEPHONE ENCOUNTER
Pt's spouse requested short script to be sent to local pharmacy while waiting on mail order supply to come in. Rx sent as requested. Pt's spouse also requested ' advice as to whether they could add Namenda to help with pt's memory loss, \"it's getting a little worse. \"

## 2023-09-07 NOTE — TELEPHONE ENCOUNTER
Future Appointments   Date Time Provider [address]   12/26/2023 10:40 AM MD FREDDIE Houston BS AMB   5/2/2024 10:20 AM MD CHRIS Marcano BS AMB

## 2023-09-08 NOTE — TELEPHONE ENCOUNTER
They were here 3 month ago. At that time it was reported no changes in his mood. What has changed? What is different? I have no issues with sending in [address] It is generally well tolerated. It might be expensive. I'm just curious if this is more of an acute change is there something else going on - UTI/infection.

## 2023-09-21 NOTE — TELEPHONE ENCOUNTER
Medication Refill Request    Jorge L Adler is requesting a refill of the following medication(s):   Tizanidine 2 mg  Please send refill to:       [address], [address] [place] [address] [phone] (Pharmacy) [phone]

## 2023-09-21 NOTE — TELEPHONE ENCOUNTER
Chief Complaint   Patient presents with    Medication Refill     Last Appointment with Dr. Amol Saunders: 6/20/23    Future Appointments   Date Time Provider [address]   12/26/2023 10:40 AM MD FREDDIE Ballesteros BS AMB   5/2/2024 10:20 AM MD CHRIS Giron BS AMB

## 2023-09-22 ENCOUNTER — TELEPHONE (OUTPATIENT)
Age: 88
End: 2023-09-22

## 2023-09-22 NOTE — TELEPHONE ENCOUNTER
Reason for call:  Spoke with pt's wife. She requested a call back from nurse in regards pt memory loss medication.     Is this a new problem: Yes    Date of last appointment:  6/20/2023     Can we respond via [employer]: No    Best call back number: Jose Valencia   [phone]

## 2023-09-24 RX ORDER — TIZANIDINE 2 MG/1
2 TABLET ORAL DAILY PRN
Qty: 30 TABLET | Refills: 0 | Status: SHIPPED | OUTPATIENT
Start: 2023-09-24

## 2023-09-26 DIAGNOSIS — G31.84 MCI (MILD COGNITIVE IMPAIRMENT) WITH MEMORY LOSS: Primary | ICD-10-CM

## 2023-09-26 NOTE — TELEPHONE ENCOUNTER
Medication Refill Request    Faby Cabello is requesting a refill of the following medication(s):   tiZANidine (ZANAFLEX) 2 MG tablet              Please send refill to:     [address]  [address] [place] [id number]  Phone: [phone] Fax: [phone]

## 2023-09-27 RX ORDER — MEMANTINE HYDROCHLORIDE 7 MG/1
7 CAPSULE, EXTENDED RELEASE ORAL DAILY
Qty: 90 CAPSULE | Refills: 0 | Status: SHIPPED | OUTPATIENT
Start: 2023-09-27

## 2023-09-27 RX ORDER — TIZANIDINE 2 MG/1
2 TABLET ORAL DAILY PRN
Qty: 30 TABLET | Refills: 0 | Status: CANCELLED | OUTPATIENT
Start: 2023-09-27

## 2023-11-15 ENCOUNTER — TELEPHONE (OUTPATIENT)
Age: 88
End: 2023-11-15

## 2023-11-15 NOTE — TELEPHONE ENCOUNTER
Spoke with pt's spouse Ida (on HIPAA). Attempted to get more info regarding pt's c/o pain. Spouse stated pt has appt with  11/27/23, and would rather discuss details at that time.

## 2023-11-15 NOTE — TELEPHONE ENCOUNTER
Reason for call:  TC from [address] [facility], Wife. Wife on PHI. Pt id verified. Ms. Nohemi Oswald states pt is in severe pain when he gets up in the morning. Ms. Nohemi Oswald states pain causes pt to have a hard time walking when he first gets up, and she/pt wants to know if there is any type of pain medication he can take to help him.      Is this a new problem: Yes    Date of last appointment:  6/20/2023     Can we respond via [employer]: No    Best call back number: [phone]

## 2023-11-27 ENCOUNTER — OFFICE VISIT (OUTPATIENT)
Age: 88
End: 2023-11-27
Payer: COMMERCIAL

## 2023-11-27 VITALS
HEART RATE: 59 BPM | BODY MASS INDEX: 26.91 KG/M2 | RESPIRATION RATE: 18 BRPM | DIASTOLIC BLOOD PRESSURE: 81 MMHG | SYSTOLIC BLOOD PRESSURE: 154 MMHG | OXYGEN SATURATION: 95 % | WEIGHT: 171.8 LBS | TEMPERATURE: 97.3 F

## 2023-11-27 DIAGNOSIS — I10 PRIMARY HYPERTENSION: ICD-10-CM

## 2023-11-27 DIAGNOSIS — M54.50 CHRONIC MIDLINE LOW BACK PAIN WITHOUT SCIATICA: Primary | ICD-10-CM

## 2023-11-27 DIAGNOSIS — G89.29 CHRONIC MIDLINE LOW BACK PAIN WITHOUT SCIATICA: Primary | ICD-10-CM

## 2023-11-27 PROCEDURE — 1123F ACP DISCUSS/DSCN MKR DOCD: CPT | Performed by: INTERNAL MEDICINE

## 2023-11-27 PROCEDURE — 99213 OFFICE O/P EST LOW 20 MIN: CPT | Performed by: INTERNAL MEDICINE

## 2023-11-27 RX ORDER — DICLOFENAC SODIUM 75 MG/1
75 TABLET, DELAYED RELEASE ORAL 2 TIMES DAILY
Qty: 60 TABLET | Refills: 2 | Status: SHIPPED | OUTPATIENT
Start: 2023-11-27

## 2023-11-27 RX ORDER — GABAPENTIN 100 MG/1
100 CAPSULE ORAL DAILY
COMMUNITY
Start: 2023-10-11 | End: 2023-12-10

## 2023-11-27 ASSESSMENT — ENCOUNTER SYMPTOMS
NAUSEA: 0
SHORTNESS OF BREATH: 0
BACK PAIN: 1
CONSTIPATION: 0
ABDOMINAL PAIN: 0
VOMITING: 0
COUGH: 0
DIARRHEA: 0

## 2023-11-27 NOTE — PATIENT INSTRUCTIONS
to the floor. You should feel your hips and pelvis rock back. Hold for about 6 seconds while breathing smoothly, and then relax. Repeat 8 to 12 times. Heel dig bridging    Lie on your back with both knees bent and your ankles bent so that only your heels are digging into the floor. Your knees should be bent about 90 degrees. Then push your heels into the floor, squeeze your buttocks, and lift your hips off the floor until your shoulders, hips, and knees are all in a straight line. Hold for about 6 seconds as you continue to breathe normally, and then slowly lower your hips back down to the floor and rest for up to 10 seconds. Do 8 to 12 repetitions. Hamstring stretch in doorway    Lie on your back in a doorway, with one leg through the open door. Slide your leg up the wall to straighten your knee. You should feel a gentle stretch down the back of your leg. Hold the stretch for at least 15 to 30 seconds. Do not arch your back, point your toes, or bend either knee. Keep one heel touching the floor and the other heel touching the wall. Repeat with your other leg. Do 2 to 4 times for each leg. Hip flexor stretch    Kneel on the floor with one knee bent and one leg behind you. Place your forward knee over your foot. Keep your other knee touching the floor. Slowly push your hips forward until you feel a stretch in the upper thigh of your rear leg. Hold the stretch for at least 15 to 30 seconds. Repeat with your other leg. Do 2 to 4 times on each side. Back press    Stand with your back 10 to 12 inches away from a wall. Lean into the wall until your back is against it. Press your lower back against the wall by pulling in your stomach muscles. Slowly slide down until your knees are slightly bent, pressing your lower back into the wall. Hold for at least 6 seconds, then slide back up the wall. Repeat 8 to 12 times. Over time, work up to holding this position for as much as 1 minute.   Follow-up care is a

## 2023-11-28 NOTE — ASSESSMENT & PLAN NOTE
Previously well controlled, likely worse due to pain.  He has regular f/u in 2-3 wks and will re-address at that time

## 2023-12-11 ENCOUNTER — OFFICE VISIT (OUTPATIENT)
Age: 88
End: 2023-12-11
Payer: COMMERCIAL

## 2023-12-11 VITALS
HEART RATE: 74 BPM | WEIGHT: 170.6 LBS | BODY MASS INDEX: 26.78 KG/M2 | HEIGHT: 67 IN | RESPIRATION RATE: 16 BRPM | OXYGEN SATURATION: 95 % | SYSTOLIC BLOOD PRESSURE: 123 MMHG | TEMPERATURE: 97.9 F | DIASTOLIC BLOOD PRESSURE: 64 MMHG

## 2023-12-11 DIAGNOSIS — Z71.89 ADVANCED CARE PLANNING/COUNSELING DISCUSSION: ICD-10-CM

## 2023-12-11 DIAGNOSIS — I25.10 CORONARY ARTERY DISEASE INVOLVING NATIVE CORONARY ARTERY OF NATIVE HEART WITHOUT ANGINA PECTORIS: ICD-10-CM

## 2023-12-11 DIAGNOSIS — G31.84 MCI (MILD COGNITIVE IMPAIRMENT) WITH MEMORY LOSS: ICD-10-CM

## 2023-12-11 DIAGNOSIS — M54.50 CHRONIC MIDLINE LOW BACK PAIN WITHOUT SCIATICA: ICD-10-CM

## 2023-12-11 DIAGNOSIS — Z00.00 MEDICARE ANNUAL WELLNESS VISIT, SUBSEQUENT: Primary | ICD-10-CM

## 2023-12-11 DIAGNOSIS — I10 PRIMARY HYPERTENSION: ICD-10-CM

## 2023-12-11 DIAGNOSIS — G89.29 CHRONIC MIDLINE LOW BACK PAIN WITHOUT SCIATICA: ICD-10-CM

## 2023-12-11 PROCEDURE — 1123F ACP DISCUSS/DSCN MKR DOCD: CPT | Performed by: INTERNAL MEDICINE

## 2023-12-11 PROCEDURE — 99214 OFFICE O/P EST MOD 30 MIN: CPT | Performed by: INTERNAL MEDICINE

## 2023-12-11 PROCEDURE — G0439 PPPS, SUBSEQ VISIT: HCPCS | Performed by: INTERNAL MEDICINE

## 2023-12-11 RX ORDER — MEMANTINE HYDROCHLORIDE 14 MG/1
14 CAPSULE, EXTENDED RELEASE ORAL DAILY
Qty: 90 CAPSULE | Refills: 0 | Status: SHIPPED | OUTPATIENT
Start: 2023-12-11

## 2023-12-11 SDOH — ECONOMIC STABILITY: INCOME INSECURITY: HOW HARD IS IT FOR YOU TO PAY FOR THE VERY BASICS LIKE FOOD, HOUSING, MEDICAL CARE, AND HEATING?: NOT HARD AT ALL

## 2023-12-11 SDOH — ECONOMIC STABILITY: FOOD INSECURITY: WITHIN THE PAST 12 MONTHS, THE FOOD YOU BOUGHT JUST DIDN'T LAST AND YOU DIDN'T HAVE MONEY TO GET MORE.: NEVER TRUE

## 2023-12-11 SDOH — ECONOMIC STABILITY: FOOD INSECURITY: WITHIN THE PAST 12 MONTHS, YOU WORRIED THAT YOUR FOOD WOULD RUN OUT BEFORE YOU GOT MONEY TO BUY MORE.: NEVER TRUE

## 2023-12-11 ASSESSMENT — ENCOUNTER SYMPTOMS
NAUSEA: 0
ABDOMINAL PAIN: 0
CONSTIPATION: 0
BACK PAIN: 1
VOMITING: 0
SHORTNESS OF BREATH: 0
COUGH: 0
BLOOD IN STOOL: 0
DIARRHEA: 0

## 2023-12-11 ASSESSMENT — LIFESTYLE VARIABLES
HOW OFTEN DO YOU HAVE A DRINK CONTAINING ALCOHOL: 2-3 TIMES A WEEK
HOW MANY STANDARD DRINKS CONTAINING ALCOHOL DO YOU HAVE ON A TYPICAL DAY: 1 OR 2

## 2023-12-11 ASSESSMENT — PATIENT HEALTH QUESTIONNAIRE - PHQ9
1. LITTLE INTEREST OR PLEASURE IN DOING THINGS: 0
SUM OF ALL RESPONSES TO PHQ QUESTIONS 1-9: 0
SUM OF ALL RESPONSES TO PHQ QUESTIONS 1-9: 0
SUM OF ALL RESPONSES TO PHQ9 QUESTIONS 1 & 2: 0
2. FEELING DOWN, DEPRESSED OR HOPELESS: 0
SUM OF ALL RESPONSES TO PHQ QUESTIONS 1-9: 0
SUM OF ALL RESPONSES TO PHQ QUESTIONS 1-9: 0

## 2023-12-11 NOTE — PROGRESS NOTES
by mouth Yes Automatic Reconciliation, Ar   finasteride (PROSCAR) 5 MG tablet Take 1 tablet by mouth daily Yes Automatic Reconciliation, Ar   diclofenac sodium (VOLTAREN) 1 % GEL Apply topically daily  Automatic Reconciliation, Ar       CareTeam (Including outside providers/suppliers regularly involved in providing care):   Patient Care Team:  Nina Quintana MD as PCP - General  Nina Quintana MD as PCP - Empaneled Provider  Jim Correa MD (Ophthalmology)  Jai Ellis MD (Orthopedic Surgery)  Julio Alvarez MD (Cardiology)     Reviewed and updated this visit:  Tobacco  Allergies  Meds  Problems  Med Hx  Surg Hx  Soc Hx  Fam Hx

## 2023-12-11 NOTE — ACP (ADVANCE CARE PLANNING)
Advance Care Planning   Advance Care Planning (ACP) Physician/NP/PA (Provider) Conversation    Date of ACP Conversation: 12/11/23  Persons included in Conversation:  patient and spouse  Length of ACP Conversation in minutes: <16 minutes (Non-Billable)    Has ACP document(s) on file - reflects the patient's care preferences.   He confirms current DNR status - completed forms on file (place new order if needed)    The patient has appointed the following active healthcare agents:    Primary Decision MakerTtalib [facility] - [phone]    Secondary Decision Maker: Buddy Dunham - Child - [phone]    Secondary Decision Maker: Andrew Frederick Child - [phone]     The Patient has the following current code status:    Code Status: DNR    Chin Lee MD

## 2023-12-11 NOTE — PATIENT INSTRUCTIONS
Personalized Preventive Plan for Tosin Harper - [birth year]  Medicare offers a range of preventive health benefits. Some of the tests and screenings are paid in full while other may be subject to a deductible, co-insurance, and/or copay. Some of these benefits include a comprehensive review of your medical history including lifestyle, illnesses that may run in your family, and various assessments and screenings as appropriate. After reviewing your medical record and screening and assessments performed today your provider may have ordered immunizations, labs, imaging, and/or referrals for you. A list of these orders (if applicable) as well as your Preventive Care list are included within your After Visit Summary for your review. Other Preventive Recommendations:    A preventive eye exam performed by an eye specialist is recommended every 1-2 years to screen for glaucoma; cataracts, macular degeneration, and other eye disorders. A preventive dental visit is recommended every 6 months. Try to get at least 150 minutes of exercise per week or 10,000 steps per day on a pedometer . Order or download the FREE \"Exercise & Physical Activity: Your Everyday Guide\" from [employer]. Call 5-203.300.9421 or search [employer] online. You need 9957-0091 mg of calcium and 4889-2087 IU of vitamin D per day. It is possible to meet your calcium requirement with diet alone, but a vitamin D supplement is usually necessary to meet this goal.  When exposed to the sun, use a sunscreen that protects against both UVA and UVB radiation with an SPF of 30 or greater. Reapply every 2 to 3 hours or after sweating, drying off with a towel, or swimming. Always wear a seat belt when traveling in a car. Always wear a helmet when riding a bicycle or motorcycle.

## 2023-12-12 NOTE — ASSESSMENT & PLAN NOTE
Stable, not improved with NSAIDs or Tylenol. Specialist records reviewed.   Agree w/ seeing pain specialist.  They had a misconception of the role of the pain specialist and they will call to schedule

## 2023-12-12 NOTE — ASSESSMENT & PLAN NOTE
Stable to slightly worse, declined as expected, reviewed expectations and will increase Namenda from 7mg to 14mg.   Will plan on increasing to 21mg at next refill and then after another 3 months will increase to 28mg

## 2023-12-18 ENCOUNTER — HOSPITAL ENCOUNTER (INPATIENT)
Facility: HOSPITAL | Age: 88
LOS: 3 days | Discharge: NURSING, REHAB OR LONG-TERM CARE | DRG: 689 | End: 2023-12-22
Attending: EMERGENCY MEDICINE | Admitting: HOSPITALIST
Payer: MEDICARE

## 2023-12-18 ENCOUNTER — APPOINTMENT (OUTPATIENT)
Facility: HOSPITAL | Age: 88
DRG: 689 | End: 2023-12-18
Payer: MEDICARE

## 2023-12-18 DIAGNOSIS — N30.01 ACUTE CYSTITIS WITH HEMATURIA: Primary | ICD-10-CM

## 2023-12-18 DIAGNOSIS — G45.9 TIA (TRANSIENT ISCHEMIC ATTACK): ICD-10-CM

## 2023-12-18 PROCEDURE — 71045 X-RAY EXAM CHEST 1 VIEW: CPT

## 2023-12-18 PROCEDURE — 0042T CT BRAIN PERFUSION: CPT

## 2023-12-18 PROCEDURE — 70498 CT ANGIOGRAPHY NECK: CPT

## 2023-12-18 PROCEDURE — 70450 CT HEAD/BRAIN W/O DYE: CPT

## 2023-12-18 PROCEDURE — 6360000004 HC RX CONTRAST MEDICATION: Performed by: EMERGENCY MEDICINE

## 2023-12-18 PROCEDURE — 99221 1ST HOSP IP/OBS SF/LOW 40: CPT | Performed by: NURSE PRACTITIONER

## 2023-12-18 PROCEDURE — 99285 EMERGENCY DEPT VISIT HI MDM: CPT

## 2023-12-18 RX ADMIN — IOPAMIDOL 100 ML: 755 INJECTION, SOLUTION INTRAVENOUS at 23:44

## 2023-12-18 RX ADMIN — IOPAMIDOL 100 ML: 755 INJECTION, SOLUTION INTRAVENOUS at 23:53

## 2023-12-19 ENCOUNTER — APPOINTMENT (OUTPATIENT)
Facility: HOSPITAL | Age: 88
DRG: 689 | End: 2023-12-19
Payer: MEDICARE

## 2023-12-19 PROBLEM — G45.9 TIA (TRANSIENT ISCHEMIC ATTACK): Status: ACTIVE | Noted: 2023-12-19

## 2023-12-19 LAB
ALBUMIN SERPL-MCNC: 2.8 G/DL (ref 3.5–5)
ALBUMIN/GLOB SERPL: 1 (ref 1.1–2.2)
ALP SERPL-CCNC: 81 U/L (ref 45–117)
ALT SERPL-CCNC: 19 U/L (ref 12–78)
ANION GAP SERPL CALC-SCNC: 7 MMOL/L (ref 5–15)
APPEARANCE UR: CLEAR
AST SERPL-CCNC: 18 U/L (ref 15–37)
B PERT DNA SPEC QL NAA+PROBE: NOT DETECTED
BACTERIA URNS QL MICRO: ABNORMAL /HPF
BASOPHILS # BLD: 0 K/UL (ref 0–0.1)
BASOPHILS NFR BLD: 0 % (ref 0–1)
BILIRUB SERPL-MCNC: 0.4 MG/DL (ref 0.2–1)
BILIRUB UR QL: NEGATIVE
BORDETELLA PARAPERTUSSIS BY PCR: NOT DETECTED
BUN SERPL-MCNC: 20 MG/DL (ref 6–20)
BUN/CREAT SERPL: 19 (ref 12–20)
C PNEUM DNA SPEC QL NAA+PROBE: NOT DETECTED
CALCIUM SERPL-MCNC: 7.6 MG/DL (ref 8.5–10.1)
CHLORIDE SERPL-SCNC: 108 MMOL/L (ref 97–108)
CO2 SERPL-SCNC: 23 MMOL/L (ref 21–32)
COLOR UR: ABNORMAL
CREAT SERPL-MCNC: 1.03 MG/DL (ref 0.7–1.3)
CRP SERPL-MCNC: 12.1 MG/DL (ref 0–0.6)
DIFFERENTIAL METHOD BLD: ABNORMAL
EKG ATRIAL RATE: 64 BPM
EKG DIAGNOSIS: NORMAL
EKG P AXIS: 47 DEGREES
EKG P-R INTERVAL: 208 MS
EKG Q-T INTERVAL: 382 MS
EKG QRS DURATION: 80 MS
EKG QTC CALCULATION (BAZETT): 394 MS
EKG R AXIS: -22 DEGREES
EKG T AXIS: 112 DEGREES
EKG VENTRICULAR RATE: 64 BPM
EOSINOPHIL # BLD: 0 K/UL (ref 0–0.4)
EOSINOPHIL NFR BLD: 0 % (ref 0–7)
EPITH CASTS URNS QL MICRO: ABNORMAL /LPF
ERYTHROCYTE [DISTWIDTH] IN BLOOD BY AUTOMATED COUNT: 13.2 % (ref 11.5–14.5)
FLUAV H1 2009 PAND RNA SPEC QL NAA+PROBE: NOT DETECTED
FLUAV H1 RNA SPEC QL NAA+PROBE: NOT DETECTED
FLUAV H3 RNA SPEC QL NAA+PROBE: NOT DETECTED
FLUAV SUBTYP SPEC NAA+PROBE: NOT DETECTED
FLUBV RNA SPEC QL NAA+PROBE: NOT DETECTED
GLOBULIN SER CALC-MCNC: 2.7 G/DL (ref 2–4)
GLUCOSE BLD STRIP.AUTO-MCNC: 124 MG/DL (ref 65–117)
GLUCOSE SERPL-MCNC: 136 MG/DL (ref 65–100)
GLUCOSE UR STRIP.AUTO-MCNC: NEGATIVE MG/DL
HADV DNA SPEC QL NAA+PROBE: NOT DETECTED
HCOV 229E RNA SPEC QL NAA+PROBE: NOT DETECTED
HCOV HKU1 RNA SPEC QL NAA+PROBE: NOT DETECTED
HCOV NL63 RNA SPEC QL NAA+PROBE: NOT DETECTED
HCOV OC43 RNA SPEC QL NAA+PROBE: NOT DETECTED
HCT VFR BLD AUTO: 38.7 % (ref 36.6–50.3)
HGB BLD-MCNC: 12.8 G/DL (ref 12.1–17)
HGB UR QL STRIP: ABNORMAL
HMPV RNA SPEC QL NAA+PROBE: NOT DETECTED
HPIV1 RNA SPEC QL NAA+PROBE: NOT DETECTED
HPIV2 RNA SPEC QL NAA+PROBE: NOT DETECTED
HPIV3 RNA SPEC QL NAA+PROBE: NOT DETECTED
HPIV4 RNA SPEC QL NAA+PROBE: NOT DETECTED
HYALINE CASTS URNS QL MICRO: ABNORMAL /LPF (ref 0–5)
IMM GRANULOCYTES # BLD AUTO: 0.1 K/UL (ref 0–0.04)
IMM GRANULOCYTES NFR BLD AUTO: 1 % (ref 0–0.5)
INR PPP: 1.1 (ref 0.9–1.1)
KETONES UR QL STRIP.AUTO: NEGATIVE MG/DL
LACTATE SERPL-SCNC: 0.8 MMOL/L (ref 0.4–2)
LEUKOCYTE ESTERASE UR QL STRIP.AUTO: ABNORMAL
LYMPHOCYTES # BLD: 0.5 K/UL (ref 0.8–3.5)
LYMPHOCYTES NFR BLD: 5 % (ref 12–49)
M PNEUMO DNA SPEC QL NAA+PROBE: NOT DETECTED
MCH RBC QN AUTO: 33.7 PG (ref 26–34)
MCHC RBC AUTO-ENTMCNC: 33.1 G/DL (ref 30–36.5)
MCV RBC AUTO: 101.8 FL (ref 80–99)
MONOCYTES # BLD: 0.8 K/UL (ref 0–1)
MONOCYTES NFR BLD: 9 % (ref 5–13)
NEUTS SEG # BLD: 7.8 K/UL (ref 1.8–8)
NEUTS SEG NFR BLD: 85 % (ref 32–75)
NITRITE UR QL STRIP.AUTO: NEGATIVE
NRBC # BLD: 0 K/UL (ref 0–0.01)
NRBC BLD-RTO: 0 PER 100 WBC
PH UR STRIP: 6 (ref 5–8)
PLATELET # BLD AUTO: 196 K/UL (ref 150–400)
PMV BLD AUTO: 10 FL (ref 8.9–12.9)
POTASSIUM SERPL-SCNC: 3.6 MMOL/L (ref 3.5–5.1)
PROT SERPL-MCNC: 5.5 G/DL (ref 6.4–8.2)
PROT UR STRIP-MCNC: ABNORMAL MG/DL
PROTHROMBIN TIME: 11.5 SEC (ref 9–11.1)
RBC # BLD AUTO: 3.8 M/UL (ref 4.1–5.7)
RBC #/AREA URNS HPF: ABNORMAL /HPF (ref 0–5)
RBC MORPH BLD: ABNORMAL
RSV RNA SPEC QL NAA+PROBE: NOT DETECTED
RV+EV RNA SPEC QL NAA+PROBE: NOT DETECTED
SARS-COV-2 RNA RESP QL NAA+PROBE: DETECTED
SERVICE CMNT-IMP: ABNORMAL
SODIUM SERPL-SCNC: 138 MMOL/L (ref 136–145)
SP GR UR REFRACTOMETRY: <1.005
SPECIMEN HOLD: NORMAL
TROPONIN I SERPL HS-MCNC: 33 NG/L (ref 0–76)
UROBILINOGEN UR QL STRIP.AUTO: 0.2 EU/DL (ref 0.2–1)
WBC # BLD AUTO: 9.2 K/UL (ref 4.1–11.1)
WBC URNS QL MICRO: ABNORMAL /HPF (ref 0–4)

## 2023-12-19 PROCEDURE — 97535 SELF CARE MNGMENT TRAINING: CPT

## 2023-12-19 PROCEDURE — 0202U NFCT DS 22 TRGT SARS-COV-2: CPT

## 2023-12-19 PROCEDURE — 84484 ASSAY OF TROPONIN QUANT: CPT

## 2023-12-19 PROCEDURE — 2580000003 HC RX 258: Performed by: HOSPITALIST

## 2023-12-19 PROCEDURE — 36415 COLL VENOUS BLD VENIPUNCTURE: CPT

## 2023-12-19 PROCEDURE — 80053 COMPREHEN METABOLIC PANEL: CPT

## 2023-12-19 PROCEDURE — 6360000002 HC RX W HCPCS: Performed by: EMERGENCY MEDICINE

## 2023-12-19 PROCEDURE — 87040 BLOOD CULTURE FOR BACTERIA: CPT

## 2023-12-19 PROCEDURE — 6360000002 HC RX W HCPCS: Performed by: HOSPITALIST

## 2023-12-19 PROCEDURE — 99222 1ST HOSP IP/OBS MODERATE 55: CPT | Performed by: NURSE PRACTITIONER

## 2023-12-19 PROCEDURE — 83605 ASSAY OF LACTIC ACID: CPT

## 2023-12-19 PROCEDURE — 93010 ELECTROCARDIOGRAM REPORT: CPT | Performed by: SPECIALIST

## 2023-12-19 PROCEDURE — 97165 OT EVAL LOW COMPLEX 30 MIN: CPT

## 2023-12-19 PROCEDURE — 86140 C-REACTIVE PROTEIN: CPT

## 2023-12-19 PROCEDURE — 6370000000 HC RX 637 (ALT 250 FOR IP): Performed by: NURSE PRACTITIONER

## 2023-12-19 PROCEDURE — 85025 COMPLETE CBC W/AUTO DIFF WBC: CPT

## 2023-12-19 PROCEDURE — 6370000000 HC RX 637 (ALT 250 FOR IP): Performed by: HOSPITALIST

## 2023-12-19 PROCEDURE — 93005 ELECTROCARDIOGRAM TRACING: CPT | Performed by: EMERGENCY MEDICINE

## 2023-12-19 PROCEDURE — 85610 PROTHROMBIN TIME: CPT

## 2023-12-19 PROCEDURE — 71045 X-RAY EXAM CHEST 1 VIEW: CPT

## 2023-12-19 PROCEDURE — 82962 GLUCOSE BLOOD TEST: CPT

## 2023-12-19 PROCEDURE — 2580000003 HC RX 258: Performed by: EMERGENCY MEDICINE

## 2023-12-19 PROCEDURE — 81001 URINALYSIS AUTO W/SCOPE: CPT

## 2023-12-19 PROCEDURE — 2060000000 HC ICU INTERMEDIATE R&B

## 2023-12-19 RX ORDER — GABAPENTIN 100 MG/1
100 CAPSULE ORAL DAILY
COMMUNITY

## 2023-12-19 RX ORDER — HYDROCHLOROTHIAZIDE 25 MG/1
12.5 TABLET ORAL DAILY
Status: DISCONTINUED | OUTPATIENT
Start: 2023-12-19 | End: 2023-12-22 | Stop reason: HOSPADM

## 2023-12-19 RX ORDER — DONEPEZIL HYDROCHLORIDE 10 MG/1
10 TABLET, FILM COATED ORAL NIGHTLY
Status: DISCONTINUED | OUTPATIENT
Start: 2023-12-19 | End: 2023-12-22 | Stop reason: HOSPADM

## 2023-12-19 RX ORDER — ASPIRIN 81 MG/1
81 TABLET, CHEWABLE ORAL DAILY
Status: DISCONTINUED | OUTPATIENT
Start: 2023-12-19 | End: 2023-12-22 | Stop reason: HOSPADM

## 2023-12-19 RX ORDER — AMLODIPINE BESYLATE 5 MG/1
10 TABLET ORAL DAILY
Status: DISCONTINUED | OUTPATIENT
Start: 2023-12-19 | End: 2023-12-22 | Stop reason: HOSPADM

## 2023-12-19 RX ORDER — FINASTERIDE 5 MG/1
5 TABLET, FILM COATED ORAL DAILY
Status: DISCONTINUED | OUTPATIENT
Start: 2023-12-19 | End: 2023-12-22 | Stop reason: HOSPADM

## 2023-12-19 RX ORDER — GABAPENTIN 100 MG/1
100 CAPSULE ORAL DAILY
Status: DISCONTINUED | OUTPATIENT
Start: 2023-12-19 | End: 2023-12-22 | Stop reason: HOSPADM

## 2023-12-19 RX ORDER — ROSUVASTATIN CALCIUM 10 MG/1
5 TABLET, COATED ORAL NIGHTLY
Status: DISCONTINUED | OUTPATIENT
Start: 2023-12-19 | End: 2023-12-22 | Stop reason: HOSPADM

## 2023-12-19 RX ORDER — MEMANTINE HYDROCHLORIDE 5 MG/1
5 TABLET ORAL 2 TIMES DAILY
Status: DISCONTINUED | OUTPATIENT
Start: 2023-12-19 | End: 2023-12-22 | Stop reason: HOSPADM

## 2023-12-19 RX ORDER — MEMANTINE HYDROCHLORIDE 14 MG/1
14 CAPSULE, EXTENDED RELEASE ORAL DAILY
Status: DISCONTINUED | OUTPATIENT
Start: 2023-12-19 | End: 2023-12-19 | Stop reason: CLARIF

## 2023-12-19 RX ORDER — FELODIPINE 10 MG/1
10 TABLET, EXTENDED RELEASE ORAL DAILY
Status: DISCONTINUED | OUTPATIENT
Start: 2023-12-19 | End: 2023-12-19 | Stop reason: CLARIF

## 2023-12-19 RX ORDER — SODIUM CHLORIDE 9 MG/ML
INJECTION, SOLUTION INTRAVENOUS CONTINUOUS
Status: DISCONTINUED | OUTPATIENT
Start: 2023-12-19 | End: 2023-12-20

## 2023-12-19 RX ADMIN — WATER 1000 MG: 1 INJECTION INTRAMUSCULAR; INTRAVENOUS; SUBCUTANEOUS at 10:10

## 2023-12-19 RX ADMIN — MEMANTINE HYDROCHLORIDE 5 MG: 5 TABLET, FILM COATED ORAL at 22:42

## 2023-12-19 RX ADMIN — SODIUM CHLORIDE: 9 INJECTION, SOLUTION INTRAVENOUS at 22:43

## 2023-12-19 RX ADMIN — MEMANTINE HYDROCHLORIDE 5 MG: 5 TABLET, FILM COATED ORAL at 11:51

## 2023-12-19 RX ADMIN — FINASTERIDE 5 MG: 5 TABLET, FILM COATED ORAL at 11:09

## 2023-12-19 RX ADMIN — DONEPEZIL HYDROCHLORIDE 10 MG: 10 TABLET, FILM COATED ORAL at 22:42

## 2023-12-19 RX ADMIN — WATER 1000 MG: 1 INJECTION INTRAMUSCULAR; INTRAVENOUS; SUBCUTANEOUS at 04:52

## 2023-12-19 RX ADMIN — ASPIRIN 81 MG CHEWABLE TABLET 81 MG: 81 TABLET CHEWABLE at 17:35

## 2023-12-19 RX ADMIN — GABAPENTIN 100 MG: 100 CAPSULE ORAL at 11:09

## 2023-12-19 RX ADMIN — ROSUVASTATIN 5 MG: 10 TABLET, FILM COATED ORAL at 22:42

## 2023-12-19 RX ADMIN — SODIUM CHLORIDE: 9 INJECTION, SOLUTION INTRAVENOUS at 10:11

## 2023-12-19 RX ADMIN — HYDROCHLOROTHIAZIDE 12.5 MG: 25 TABLET ORAL at 11:09

## 2023-12-19 RX ADMIN — AMLODIPINE BESYLATE 10 MG: 5 TABLET ORAL at 11:09

## 2023-12-19 NOTE — CARE COORDINATION
Care Management Initial Assessment       RUR:13%  Readmission? No  1st IM letter given?  No- patients chart is showing a [employer] commercial plan  1st  letter given: No    Transition of Care: TBD; possibly SNF or home with home health; no final recs yet; pending medical progress; patient normally lives in independent living at [facility]    NOTE: patient is covid positive- transferring to room #407 on 12/19    NOTE: if the patient needs SNF- the health care unit at [facility] only has private pay (not a medicare facility)     NOTE: patient normally lives in [address] [place] [address] [facility]- contact is Napoleon Chaudhari- [phone]  and fax is [phone]        Transport Plan: likely needs WC Josué Dumas or stretcher (not set up yet)     Main contact is Frances Robles- [phone]     Discharge pending:  -pending medical progress and care recommendations        [id number]: this Cm was contacted by Napoleon Chaudhari at [facility] inquiring about a progress update on patient; this CM faxed to Napoleon Chaudhari at the [facility]- updated clinicals and H&P (verbal permission from patients wife) ; this CM met with patient and his wife at bedside; patient is alert and oriented x2; per patients wife; patient does have medicare and [employer] but the insurance cards are at home; she will bring them to the hospital on 12/20 to CM; they have lived together in the [address] [place] [address] section of the [facility] since 2021; patient has some baseline dementia; he uses a cane to ambulate; pcp is Dante Garg MD; patient needs some help with adls at home     89/50/09 1551   Service Assessment   Patient Orientation Alert and Oriented   Cognition Short Term Memory Deficit   History Provided By Medical Record   Primary [address] is: Named in [address]   PCP Verified by CM Yes   Last Visit to PCP Within last 3 months   Can patient return to prior living arrangement Yes   Ability

## 2023-12-19 NOTE — CONSULTS
Patient examined and chart reviewed. No focal weakness noted on exam. MRI brain and aspirin ordered. Formal note to follow.      HECTOR Garcia  Neurology

## 2023-12-19 NOTE — PLAN OF CARE
(unsupported)  Standing: Impaired  Standing - Static: Fair;Constant support  Standing - Dynamic: Fair;Poor;Constant support      ADL Assessment:          Feeding: Setup       Grooming: Minimal assistance  Grooming Skilled Clinical Factors: standing at the sink to wash hands, constant A for balance and safety with anteriorly flexed posture    UE Bathing: Minimal assistance  UE Bathing Skilled Clinical Factors: infer standing         LE Bathing: Moderate assistance  LE Bathing Skilled Clinical Factors: infer for standing balance and safety    UE Dressing: Setup  UE Dressing Skilled Clinical Factors: infer seated, increased A standing    LE Dressing: Moderate assistance  LE Dressing Skilled Clinical Factors: A for balance with distal & proximal donning (seated & standing) with mod safety cues    Toileting: Moderate assistance  Toileting Skilled Clinical Factors: A for transfer, steadying with brief management, and mod-max safety cues; decreased awareness of loss of toilet paper in dominant RUE with wiping         Functional Mobility: Minimal assistance; Adaptive equipment  Functional Mobility Skilled Clinical Factors: constant Min A for functional mobility to/from toilet in room with RW support; anteriorly flexed posture with all activity, worsening with fatigue despite mod-max cues, fair-poor carryover; highly impulsive (buster wtih fatigue) with max cues for RW management          ADL Intervention and task modifications:      [address] [facility] AM-PACTM \"6 Clicks\"                                                       Daily Activity Inpatient Short Form  How much help from another person does the patient currently need. .. Total; A Lot A Little None   1. Putting on and taking off regular lower body clothing? []  1 [x]  2 []  3 []  4   2. Bathing (including washing, rinsing, drying)? []  1 [x]  2 []  3 []  4   3. Toileting, which includes using toilet, bedpan or urinal? [] 1 [x]  2 []  3 []  4   4.   Putting on and taking

## 2023-12-19 NOTE — H&P
History and Physical    Date of Service:  12/19/2023  Primary Care Provider: Laine Leon MD  Source of information: patient    Chief Complaint: Extremity Weakness      History of Presenting Illness:   Alley Sethi is a 81 y.o. male who presents with Fall. Pt from independent living came with above. He has  PMH significant for coronary artery disease, hypertension came with  left lower extremity weakness and trouble walking . Code stroke called seen by tele neuro. W/U negative. Denies trauma. Patient denies any current weakness. He was admitted for further MGMT , he has UTI. He is hard of nicole. The patient denies any headache, blurry vision, sore throat, trouble swallowing, trouble with speech, chest pain, SOB, cough, fever, chills, N/V/D, abd pain, urinary symptoms, constipation, recent travels, sick contacts, focal or generalized neurological symptoms, falls, injuries, rashes, contact with COVID-19 diagnosed patients, hematemesis, melena, hemoptysis, hematuria, rashes, denies starting any new medications and denies any other concerns or problems besides as mentioned above. REVIEW OF SYSTEMS:  Pertinent items are noted in the History of Present Illness. Past Medical History:   Diagnosis Date    BPH (benign prostatic hypertrophy)     CAD (coronary artery disease)     Cataract, bilateral 04/06/2016    s/p surgery in 2016    Hypertension     Insomnia 02/11/2015    Shingles       Past Surgical History:   Procedure Laterality Date    APPENDECTOMY      CARDIAC CATHETERIZATION  04/23/2019    STEMI - stent to LAD x 2, stent to Ost Cx to Prox Cx x1    CATARACT REMOVAL Right 04/11/2016    CATARACT REMOVAL Left 04/25/2016    COLONOSCOPY  01/29/2013    normal    KNEE ARTHROSCOPY Right     ORTHOPEDIC SURGERY Right     scar tissue removal after surgery     Prior to Admission medications    Medication Sig Start Date End Date Taking?  Authorizing Provider   gabapentin (NEURONTIN) 100 MG copy.      Left Ventricle: Normal left ventricular systolic function with a visually estimated EF of 60 - 65%. Left ventricle is smaller than normal. Mildly increased wall thickness. Normal wall motion. .    Aortic Valve: Mild regurgitation. Mitral Valve: Mild regurgitation. Pulmonic Valve: Mild regurgitation. Signed by: Samara Jhaveri MD on 5/4/2023 11:09 AM        Notes reviewed from all clinical/nonclinical/nursing services involved in patient's clinical care. Care coordination discussions were held with appropriate clinical/nonclinical/ nursing providers based on care coordination needs. Assessment:   Given the patient's current clinical presentation, there is a high level of concern for decompensation if discharged from the emergency department. Complex decision making was performed, which includes reviewing the patient's available past medical records, laboratory results, and imaging studies. Principal Problem:    TIA (transient ischemic attack)  Resolved Problems:    * No resolved hospital problems.  *      Plan:     Fall with UTI  -- admitted to neuro tele as code stroke  -- CT/ CTA negative , neuro consulted   -- Check ECHO   -- IVF supportive care      UTI POA  - cont ceftriaxone f/u cultures    HTN/ HL-- cont home medication    Dementia  -- cont  home medication    Elevated positive patient was on room air supportive care check D-dimer and CRP    PT/OT eval       DIET:  regular   ISOLATION PRECAUTIONS: No active isolations  CODE STATUS: [unfilled]    DVT PROPHYLAXIS: Lovenox  FUNCTIONAL STATUS PRIOR TO HOSPITALIZATION: Independent  Ambulatory status/function: Better than average  EARLY MOBILITY ASSESSMENT: 1-total assistance  ANTICIPATED DISCHARGE: 2-3 days  ANTICIPATED DISPOSITION: [address]  EMERGENCY CONTACT/SURROGATE DECISION MAKER:     CRITICAL CARE WAS PERFORMED FOR THIS ENCOUNTER:       Signed By: Chastity Fong MD     December 19, 2023         Please note

## 2023-12-19 NOTE — ED TRIAGE NOTES
Pt came by EMS from independent living facility. Pt wife states that she went to check on her  at 2100 and pt was having difficulty walking to the chair. Pt baseline is able to walk so she called the 911. Pt did not have sx of dizziness, aphasia, or confusion. Last known well was 1930. EMS states the pt only had weakness on the left leg. Pt A&O x 4, VSS, and Bg 91. PMH is HTN, recent UTI, and CABG. Pt takes Asprin.

## 2023-12-19 NOTE — CONSULTS
NIHSS Code Stroke Documentation      Person Administering Scale: Ana BabinKELLY NP    TIME: 11:40 PM    LKW: 20:30    Kita NP Arrival Time: 23:30    PMH:   Past Medical History:   Diagnosis Date    BPH (benign prostatic hypertrophy)     CAD (coronary artery disease)     Cataract, bilateral 2016    s/p surgery in 2016    Hypertension     Insomnia 2015    Shingles         SUBJECTIVE: Transient LLE weakness now resolved upon arrival to CT room. It was present when pt arrived to ED. SBP was 160's now 140's. BG 91. Pt is [place] with hearing aide in right ear. Pt uses a cane for ambulation. NIHSS  1a  Level of consciousness: 0=alert; keenly responsive   1b. LOC questions:  0=Answers both questions correctly   1c. LOC commands: 0=Performs both tasks correctly   2. Best Gaze: 0=normal   3. Visual: 0=No visual loss   4. Facial Palsy: 0=Normal symmetric movement   5a. Motor left arm: 0=No drift, limb holds 90 (or 45) degrees for full 10 seconds   5b. Motor right arm: 0=No drift, limb holds 90 (or 45) degrees for full 10 seconds   6a. Motor left le=No drift; leg holds 30-degree position for full 5 seconds. 6b  Motor right le=No drift; leg holds 30-degree position for full 5 seconds. 7. Limb Ataxia: 0=Absent   8. Sensory: 0=Normal; no sensory loss   9. Best Language:  0=No aphasia, normal   10. Dysarthria: 0=Normal   11. Extinction and Inattention: 0=No abnormality    Total:    0     Premorbid mRS: 1    VAN: NEGATIVE    tNK Candidate: NO    Mechanical Thrombectomy Candidate: NO    ANTIPLT/AC/ANTITHROMB: YES (Apirin 8 mg daily)    CT Result (most recent):  CT HEAD WO CONTRAST 2023    Narrative  CLINICAL HISTORY: Stroke  INDICATION: Stroke  COMPARISON: . CT dose reduction was achieved through use of a standardized protocol tailored  for this examination and automatic exposure control for dose modulation.   TECHNIQUE: Serial axial images with a collimation of 5 mm were carotid and left subclavian arteries are patent. .  There is  0%stenosis in the right internal carotid artery utilizing NASCET  criteria. There is  0%stenosis in the left internal carotid artery utilizing NASCET  criteria. CTA HEAD:  Dominant left vertebral artery. A1 segments are present bilaterally. . The  basilar artery and its branches are normal. The internal carotid, anterior  cerebral, and middle cerebral arteries are patent. There is no flow-limiting  intracranial stenosis. There is no aneurysm. There are no sizable posterior  communicating arteries. There is minimal chronic microvascular change. CT PERFUSION: No evidence of perfusion mismatch. No evidence of reversible  ischemia. R CBF<30% :0  Tmax >6s: 0    Impression  No acute intracranial process. There is no major vessel occlusion. There is no hemodynamically significant stenosis, aneurysm or dissection  identified. .   Discussed with: ED physician Dr. Chris Mccloud, ED nurse, and tele-neuro Dr. Selena Bernal    Time spent: 45 minutes. KELLY Hernandez - NP  Neurocritical Care Nurse Practitioner  [phone]    Please note that this dictation was completed with NOZA, the [employer] voice recognition software. Quite often unanticipated grammatical, syntax, homophones, and other interpretive errors are inadvertently transcribed by the computer software. Please excuse any errors that have escaped final proofreading.

## 2023-12-19 NOTE — ED PROVIDER NOTES
[facility] EMERGENCY DEP  EMERGENCY DEPARTMENT ENCOUNTER      Pt Name: Yoandy Michael  MRN: [id number]  [address] [facility] 1935  Date of evaluation: 12/18/2023  Provider: Anya Hsu MD    CHIEF COMPLAINT       Chief Complaint   Patient presents with    Extremity Weakness         HISTORY OF PRESENT ILLNESS    HPI    Yoandy Michael is a 81 y.o. male with PMH significant for coronary artery disease, hypertension who presents to the emergency department with complaints of left lower extremity weakness and trouble walking with a last known well time at 8:30 PM.  Denies trauma. Patient denies any current weakness. Denies any recent fever, chills, nausea, vomiting, chest pain, shortness of breath. Denies urinary complaints. Denies cough or cold symptoms. Nursing Notes were reviewed. REVIEW OF SYSTEMS       Review of Systems   Constitutional:  Negative for fever. Respiratory:  Negative for cough and shortness of breath. Cardiovascular:  Negative for chest pain. Gastrointestinal:  Negative for abdominal pain. Musculoskeletal:  Negative for myalgias. Skin:  Negative for wound. Neurological:  Positive for weakness.            PAST MEDICAL HISTORY     Past Medical History:   Diagnosis Date    BPH (benign prostatic hypertrophy)     CAD (coronary artery disease)     Cataract, bilateral 04/06/2016    s/p surgery in 2016    Hypertension     Insomnia 02/11/2015    Shingles          SURGICAL HISTORY       Past Surgical History:   Procedure Laterality Date    APPENDECTOMY      CARDIAC CATHETERIZATION  04/23/2019    STEMI - stent to LAD x 2, stent to Ost Cx to Prox Cx x1    CATARACT REMOVAL Right 04/11/2016    CATARACT REMOVAL Left 04/25/2016    COLONOSCOPY  01/29/2013    normal    KNEE ARTHROSCOPY Right     ORTHOPEDIC SURGERY Right     scar tissue removal after surgery         CURRENT MEDICATIONS       Previous Medications    DICLOFENAC (VOLTAREN) 75 MG EC TABLET    Take 1 tablet by mouth 2 times daily    DICLOFENAC Calcium 7.6 (*)     Total Protein 5.5 (*)     Albumin 2.8 (*)     Albumin/Globulin Ratio 1.0 (*)     All other components within normal limits   PROTIME-INR - Abnormal; Notable for the following components:    Protime 11.5 (*)     All other components within normal limits   URINALYSIS WITH MICROSCOPIC - Abnormal; Notable for the following components:    Protein, UA TRACE (*)     Blood, Urine SMALL (*)     Leukocyte Esterase, Urine TRACE (*)     BACTERIA, URINE 4+ (*)     All other components within normal limits   POCT GLUCOSE - Abnormal; Notable for the following components:    POC Glucose 124 (*)     All other components within normal limits   URINE CULTURE HOLD SAMPLE   TROPONIN   EXTRA TUBES HOLD   POCT GLUCOSE           EMERGENCY DEPARTMENT COURSE and DIFFERENTIAL DIAGNOSIS/MDM:   Vitals:    Vitals:    12/19/23 0200 12/19/23 0230 12/19/23 0300 12/19/23 0330   BP: 139/67 (!) 147/90 108/65 130/65   Pulse: 66 71 56 61   Resp: 13 17 15 14   Temp:       SpO2: 91%      Weight:       Height:             Medical Decision Making  71-year-old male with history of coronary artery disease, hypertension presents with complaints of left lower extremity weakness noticed at 10:30 PM tonight. Last known well 8:30 PM.  Denies any current weakness. Initial NIH stroke score 0. Patient is well-appearing, no acute distress, hemodynamically stable, afebrile, nontoxic, answer questions appropriately. Plan-code stroke, UA. CT unremarkable  UA shows urinary tract infection. Amount and/or Complexity of Data Reviewed  Labs: ordered. Radiology: ordered. ECG/medicine tests: ordered. Risk  Prescription drug management. Decision regarding hospitalization. REASSESSMENT     ED Course as of 12/19/23 0358   Tue Dec 19, 2023   0000 12:00 AM  Madison Castillo MD spoke with Dr. Gerardo Arvizu, Consult for Neurology. Discussed available diagnostic tests and clinical findings. He/She is in agreement with care plans as outlined.  He/she

## 2023-12-19 NOTE — ED NOTES
TRANSFER - OUT REPORT:    Verbal report given to hugo Kaufman  on Radhika Richardson  being transferred to [facility] for routine progression of patient care       Report consisted of patient's Situation, Background, Assessment and   Recommendations(SBAR). Information from the following report(s) Nurse Handoff Report, Index, ED Encounter Summary, Adult Overview, Intake/Output, MAR, Recent Results, Cardiac Rhythm NSR, and Neuro Assessment was reviewed with the receiving nurse. [place] Assessment:    Presents to emergency department  because of falls (Syncope, seizure, or loss of consciousness): No  Age > 70: Yes  Altered Mental Status, Intoxication with alcohol or substance confusion (Disorientation, impaired judgment, poor safety awaremess, or inability to follow instructions): No  Impaired Mobility: Ambulates or transfers with assistive devices or assistance; Unable to ambulate or transer.: Yes  Nursing Judgement: Yes          Lines:   Peripheral IV 12/19/23 Distal;Left; Anterior Cephalic (Active)        Opportunity for questions and clarification was provided.       Patient transported with:  Monitor and Registered Nurse           Blaine Strickland RN  12/19/23 8062

## 2023-12-20 LAB
CHOLEST SERPL-MCNC: 123 MG/DL
D DIMER PPP FEU-MCNC: 0.63 MG/L FEU (ref 0–0.65)
EST. AVERAGE GLUCOSE BLD GHB EST-MCNC: 108 MG/DL
HBA1C MFR BLD: 5.4 % (ref 4–5.6)
HDLC SERPL-MCNC: 62 MG/DL
HDLC SERPL: 2 (ref 0–5)
LDLC SERPL CALC-MCNC: 46 MG/DL (ref 0–100)
TRIGL SERPL-MCNC: 75 MG/DL
VLDLC SERPL CALC-MCNC: 15 MG/DL

## 2023-12-20 PROCEDURE — 97161 PT EVAL LOW COMPLEX 20 MIN: CPT

## 2023-12-20 PROCEDURE — 83036 HEMOGLOBIN GLYCOSYLATED A1C: CPT

## 2023-12-20 PROCEDURE — 97535 SELF CARE MNGMENT TRAINING: CPT

## 2023-12-20 PROCEDURE — 97530 THERAPEUTIC ACTIVITIES: CPT

## 2023-12-20 PROCEDURE — 6370000000 HC RX 637 (ALT 250 FOR IP): Performed by: NURSE PRACTITIONER

## 2023-12-20 PROCEDURE — 6360000002 HC RX W HCPCS: Performed by: HOSPITALIST

## 2023-12-20 PROCEDURE — 2060000000 HC ICU INTERMEDIATE R&B

## 2023-12-20 PROCEDURE — 36415 COLL VENOUS BLD VENIPUNCTURE: CPT

## 2023-12-20 PROCEDURE — 6370000000 HC RX 637 (ALT 250 FOR IP): Performed by: HOSPITALIST

## 2023-12-20 PROCEDURE — 85379 FIBRIN DEGRADATION QUANT: CPT

## 2023-12-20 PROCEDURE — 97116 GAIT TRAINING THERAPY: CPT

## 2023-12-20 PROCEDURE — 80061 LIPID PANEL: CPT

## 2023-12-20 RX ORDER — ENOXAPARIN SODIUM 100 MG/ML
40 INJECTION SUBCUTANEOUS DAILY
Status: DISCONTINUED | OUTPATIENT
Start: 2023-12-20 | End: 2023-12-22 | Stop reason: HOSPADM

## 2023-12-20 RX ADMIN — DONEPEZIL HYDROCHLORIDE 10 MG: 10 TABLET, FILM COATED ORAL at 21:44

## 2023-12-20 RX ADMIN — MEMANTINE HYDROCHLORIDE 5 MG: 5 TABLET, FILM COATED ORAL at 21:44

## 2023-12-20 RX ADMIN — ASPIRIN 81 MG CHEWABLE TABLET 81 MG: 81 TABLET CHEWABLE at 10:19

## 2023-12-20 RX ADMIN — ENOXAPARIN SODIUM 40 MG: 100 INJECTION SUBCUTANEOUS at 15:55

## 2023-12-20 RX ADMIN — AMLODIPINE BESYLATE 10 MG: 5 TABLET ORAL at 10:19

## 2023-12-20 RX ADMIN — HYDROCHLOROTHIAZIDE 12.5 MG: 25 TABLET ORAL at 10:19

## 2023-12-20 RX ADMIN — FINASTERIDE 5 MG: 5 TABLET, FILM COATED ORAL at 10:18

## 2023-12-20 RX ADMIN — ROSUVASTATIN 5 MG: 10 TABLET, FILM COATED ORAL at 21:43

## 2023-12-20 RX ADMIN — MEMANTINE HYDROCHLORIDE 5 MG: 5 TABLET, FILM COATED ORAL at 10:18

## 2023-12-20 RX ADMIN — GABAPENTIN 100 MG: 100 CAPSULE ORAL at 10:19

## 2023-12-20 NOTE — PLAN OF CARE
Problem: Discharge Planning  Goal: Discharge to home or other facility with appropriate resources  Outcome: Progressing     Problem: Pain  Goal: Verbalizes/displays adequate comfort level or baseline comfort level  Outcome: Progressing     Problem: Occupational Therapy - Adult  Goal: By Discharge: Performs self-care activities at highest level of function for planned discharge setting. See evaluation for individualized goals. Description: FUNCTIONAL STATUS PRIOR TO ADMISSION:  Patient was IND for ADLs and mobility with no AD, lives with his wife in [address] [facility]. Assist for IADLs, patient with hx of mild dementia. Occupational Therapy Goals:  Initiated 12/19/2023  1. Patient will perform grooming at the sink with Contact Guard Assist within 7 day(s). 2.  Patient will perform bathing with Minimal Assist within 7 day(s). 3.  Patient will perform lower body dressing with Minimal Assist within 7 day(s). 4.  Patient will perform toilet transfers with Minimal Assist  within 7 day(s). 5.  Patient will perform all aspects of toileting with Minimal Assist within 7 day(s). 6.  Patient will participate in upper extremity therapeutic exercise/activities with Supervision for 10 minutes with rest breaks PRN within 7 day(s). 7.  Patient will utilize energy conservation techniques during functional activities with verbal, visual, and tactile cues within 7 day(s). 12/20/2023 1304 by Colleen Luke OT  Outcome: Progressing     Problem: Physical Therapy - Adult  Goal: By Discharge: Performs mobility at highest level of function for planned discharge setting. See evaluation for individualized goals. Description: FUNCTIONAL STATUS PRIOR TO ADMISSION: Modified Independent with SPC in ILF. Uses gym daily to exercise. HOME SUPPORT PRIOR TO ADMISSION: Lives with spouse. Did not require significant assistance with ADLs    Physical Therapy Goals  Initiated 12/20/2023  1.   Patient will move from supine to sit

## 2023-12-20 NOTE — WOUND CARE
WOCN Note:     New consult placed for assessment of sacrum and buttocks. Chart reviewed. Assessed in 407/01. Tosin Batistaw is a 81 y.o. y/o male who presented for TIA   Acute cystitis with hematuria  Admitted on 12/18/2023    Past Medical History:   Diagnosis Date    BPH (benign prostatic hypertrophy)     CAD (coronary artery disease)     Cataract, bilateral 04/06/2016    s/p surgery in 2016    Hypertension     Insomnia 02/11/2015    Shingles      Lab Results   Component Value Date/Time    WBC 9.2 12/19/2023 12:14 AM    LABA1C 5.4 12/20/2023 12:59 AM    POCGLU 124 (H) 12/19/2023 12:11 AM    HGB 12.8 12/19/2023 12:14 AM    HCT 38.7 12/19/2023 12:14 AM     12/19/2023 12:14 AM        Tobacco Use      Smoking status: Former      Smokeless tobacco: Never      Tobacco comments: Quit smoking: quit smoking cigarettes/pipe - age in 30's     ADULT DIET; Regular; 4 carb choices (60 gm/meal)     Assessment:   Patient is A&O x 3, communicative, continent and mobile. Bed: low air loss   Patient reports no pain. Heels offloaded with pillows. Heels intact without erythema. Sacrum and buttocks intact without erythema. No open wounds. Wound, Pressure Prevention & Skin Care Recommendations:    Minimize layers of linen/pads under patient to optimize support surface. 2.  Turn/reposition approximately every 2 hours and offload heels. 3.  Manage moisture/ Keep skin folds clean and dry/minimize brief usage. 4.  Specialty bed: immerse low air loss. Use only flat sheet and one incontinence pad. 5.  Protect Sacrum with sacral foam.    Discussed above plan with patient & RN. Transition of Care:   Plan to sign off, reconsult if needed.     JOSE ALFREDO Bhakta RN [facility]  [address] [facility] Inpatient Wound Care  Available on [employer]  Office [phone]

## 2023-12-20 NOTE — PROGRESS NOTES
Transition of Care Plan:    Return to independent living vs SNF. SNF choices pending. Tranpsort TBD. CM spoke with liaison, Micaela Mares at [facility]. Micaela Mares confirmed the patient is from independent living and they cannot accept the patient in healthcare due to covid and no private rooms. Pending progression with therapy and patient could discharge back home to independent living with wife and [facility] therapy. CM left SNF list in patient's room for choices for back up plan if patient does not progress with therapy. Transport TBD. CM uploaded patient's insurance cards showing Medicare A and B and [employer] Medicare PPO. [facility] contact: [facility]- contact is Micaela Mares- [phone]  and fax is [phone]     RUR: 11%  Prior Level of Functioning: Independent living at [facility]  Disposition: Return to Independent living vs SNF. Pending progress with therapy. If SNF or IPR: Date FOC offered: 12/20. Date FOC received: Pending   Accepting facility: Pending   Date authorization started with reference number: No auth with medicare. Follow up appointments: Per attending's recommendations  DME needed: per snf's recommendations. Patient uses a cane baseline   Transportation at discharge: TBD  IM/IMM Medicare/ letter given: No.    Caregiver Contact: Wife: Real Arbour: [phone]  Discharge Caregiver contacted prior to discharge? Y   Care Conference needed? No   Barriers to discharge: SNF placement.      Javier Nagel RN/CRM  [phone]

## 2023-12-20 NOTE — PLAN OF CARE
Problem: Occupational Therapy - Adult  Goal: By Discharge: Performs self-care activities at highest level of function for planned discharge setting. See evaluation for individualized goals. Description: FUNCTIONAL STATUS PRIOR TO ADMISSION:  Patient was IND for ADLs and mobility with no AD, [address] at [employer] [employer]. Assist for IADLs, patient with hx of mild dementia. Occupational Therapy Goals:  Initiated 12/19/2023  1. Patient will perform grooming at the sink with Contact Guard Assist within 7 day(s). 2.  Patient will perform bathing with Minimal Assist within 7 day(s). 3.  Patient will perform lower body dressing with Minimal Assist within 7 day(s). 4.  Patient will perform toilet transfers with Minimal Assist  within 7 day(s). 5.  Patient will perform all aspects of toileting with Minimal Assist within 7 day(s). 6.  Patient will participate in upper extremity therapeutic exercise/activities with Supervision for 10 minutes with rest breaks PRN within 7 day(s). 7.  Patient will utilize energy conservation techniques during functional activities with verbal, visual, and tactile cues within 7 day(s). Outcome: Progressing   OCCUPATIONAL THERAPY TREATMENT  Patient: Tosin Harper (31 y.o. male)  Date: 12/20/2023  Primary Diagnosis: TIA (transient ischemic attack) [G45.9]  Acute cystitis with hematuria [N30.01]       Precautions: Fall Risk, Other (comment) (COVID)                Chart, occupational therapy assessment, plan of care, and goals were reviewed. ASSESSMENT  Patient continues to benefit from skilled OT services and is progressing towards goals, but remains limited by flexed standing posture & impaired standing tolerance/balance 2* lumbar back pain, as well as COVID+ however patient SpO2>94% on RA throughout session.  Patient completed LB ADL setup with intact tailor sit, then bathroom ADLs SBA with min verbal cues for safety/hand placement (pt does not use RW at PRN  7. DME used to help conserve energy, such as a shower seat, a stool or chair in the kitchen, and pushing or pulling items instead of carrying them. Pain Ratin/10   Pain Intervention(s):   pain is at a level acceptable to the patient      Activity Tolerance:   Good, Fair , requires rest breaks, and SpO2 stable on room air  Please refer to the flowsheet for vital signs taken during this treatment. After treatment:   Patient left in no apparent distress sitting up in chair, Call bell within reach, and Caregiver / family present    COMMUNICATION/EDUCATION:   The patient's plan of care was discussed with: physical therapist    Patient Education  Education Given To: Patient; Family  Education Provided: Role of Therapy;Plan of Care;ADL Adaptive Strategies;Transfer Training;Precautions; Fall Prevention Strategies  Education Method: Demonstration;Verbal  Barriers to Learning: Hearing  Education Outcome: Verbalized understanding;Continued education needed;Demonstrated understanding    Thank you for this referral.  Jony Melvin, OT  Minutes: 38

## 2023-12-20 NOTE — PLAN OF CARE
Problem: Physical Therapy - Adult  Goal: By Discharge: Performs mobility at highest level of function for planned discharge setting. See evaluation for individualized goals. Description: FUNCTIONAL STATUS PRIOR TO ADMISSION: Modified Independent with SPC in ILF. Uses gym daily to exercise. HOME SUPPORT PRIOR TO ADMISSION: Lives with spouse. Did not require significant assistance with ADLs    Physical Therapy Goals  Initiated 12/20/2023  1. Patient will move from supine to sit and sit to supine in bed with modified independence within 7 day(s). 2.  Patient will perform sit to stand with modified independence within 7 day(s). 3.  Patient will transfer from bed to chair and chair to bed with modified independence using the least restrictive device within 7 day(s). 4.  Patient will ambulate with modified independence for 75 feet with the least restrictive device within 7 day(s). (Distance limited to COVID+ status)  Patient has no stairs in home thus no stair goal.       Outcome: Progressing       PHYSICAL THERAPY EVALUATION    Patient: Larry Sims (9 y.o. male)  Date: 12/20/2023  Primary Diagnosis: TIA (transient ischemic attack) [G45.9]  Acute cystitis with hematuria [N30.01]       Precautions: Fall Risk, Other (comment) (COVID)                    ASSESSMENT :   DEFICITS/IMPAIRMENTS:   The patient is limited by decreased functional mobility, ROM, strength, body mechanics, activity tolerance, endurance, balance, increased pain levels. Pt with pmh coronary artery disease, hypertension came with  left lower extremity weakness and trouble walking . Code stroke called seen by tele neuro. W/U negative. Pt had GLF at [facility] and has hx of chronic LBP. Pt received sitting in bedside chair, on RA, tele, spouse present, agreeable to work with therapy.  Pt performed tranfers, gait training, toileting, ADLs, balance assessment, and education and exercises to improve fwd flexion (pt cued on and performed with

## 2023-12-20 NOTE — PLAN OF CARE
Problem: Discharge Planning  Goal: Discharge to home or other facility with appropriate resources  Outcome: Progressing     Problem: Pain  Goal: Verbalizes/displays adequate comfort level or baseline comfort level  Outcome: Progressing     Problem: Safety - Adult  Goal: Free from fall injury  Outcome: Progressing     Problem: Occupational Therapy - Adult  Goal: By Discharge: Performs self-care activities at highest level of function for planned discharge setting. See evaluation for individualized goals. Description: FUNCTIONAL STATUS PRIOR TO ADMISSION:  Patient was IND for ADLs and mobility with no AD, lives with his wife in [address] [place] [address] at [employer]. Assist for IADLs, patient with hx of mild dementia. Occupational Therapy Goals:  Initiated 12/19/2023  1. Patient will perform grooming at the sink with Contact Guard Assist within 7 day(s). 2.  Patient will perform bathing with Minimal Assist within 7 day(s). 3.  Patient will perform lower body dressing with Minimal Assist within 7 day(s). 4.  Patient will perform toilet transfers with Minimal Assist  within 7 day(s). 5.  Patient will perform all aspects of toileting with Minimal Assist within 7 day(s). 6.  Patient will participate in upper extremity therapeutic exercise/activities with Supervision for 10 minutes with rest breaks PRN within 7 day(s). 7.  Patient will utilize energy conservation techniques during functional activities with verbal, visual, and tactile cues within 7 day(s). 12/19/2023 1139 by Ruth Vitale OT  Outcome: Progressing     Problem: Skin/Tissue Integrity  Goal: Absence of new skin breakdown  Description: 1. Monitor for areas of redness and/or skin breakdown  2. Assess vascular access sites hourly  3. Every 4-6 hours minimum:  Change oxygen saturation probe site  4.   Every 4-6 hours:  If on nasal continuous positive airway pressure, respiratory therapy assess nares and determine need for appliance change or resting period.   Outcome: Progressing

## 2023-12-21 ENCOUNTER — APPOINTMENT (OUTPATIENT)
Facility: HOSPITAL | Age: 88
DRG: 689 | End: 2023-12-21
Attending: INTERNAL MEDICINE
Payer: MEDICARE

## 2023-12-21 ENCOUNTER — APPOINTMENT (OUTPATIENT)
Facility: HOSPITAL | Age: 88
DRG: 689 | End: 2023-12-21
Payer: MEDICARE

## 2023-12-21 LAB
EKG ATRIAL RATE: 55 BPM
EKG DIAGNOSIS: NORMAL
EKG P AXIS: 55 DEGREES
EKG P-R INTERVAL: 210 MS
EKG Q-T INTERVAL: 442 MS
EKG QRS DURATION: 90 MS
EKG QTC CALCULATION (BAZETT): 422 MS
EKG R AXIS: 0 DEGREES
EKG T AXIS: 261 DEGREES
EKG VENTRICULAR RATE: 55 BPM

## 2023-12-21 PROCEDURE — 97530 THERAPEUTIC ACTIVITIES: CPT

## 2023-12-21 PROCEDURE — 93306 TTE W/DOPPLER COMPLETE: CPT

## 2023-12-21 PROCEDURE — 97116 GAIT TRAINING THERAPY: CPT

## 2023-12-21 PROCEDURE — 70551 MRI BRAIN STEM W/O DYE: CPT

## 2023-12-21 PROCEDURE — 2060000000 HC ICU INTERMEDIATE R&B

## 2023-12-21 PROCEDURE — 6370000000 HC RX 637 (ALT 250 FOR IP): Performed by: HOSPITALIST

## 2023-12-21 PROCEDURE — 97535 SELF CARE MNGMENT TRAINING: CPT

## 2023-12-21 PROCEDURE — 6370000000 HC RX 637 (ALT 250 FOR IP): Performed by: NURSE PRACTITIONER

## 2023-12-21 PROCEDURE — 6360000002 HC RX W HCPCS: Performed by: HOSPITALIST

## 2023-12-21 PROCEDURE — 99231 SBSQ HOSP IP/OBS SF/LOW 25: CPT | Performed by: NURSE PRACTITIONER

## 2023-12-21 RX ADMIN — GABAPENTIN 100 MG: 100 CAPSULE ORAL at 09:19

## 2023-12-21 RX ADMIN — MEMANTINE HYDROCHLORIDE 5 MG: 5 TABLET, FILM COATED ORAL at 09:18

## 2023-12-21 RX ADMIN — FINASTERIDE 5 MG: 5 TABLET, FILM COATED ORAL at 09:19

## 2023-12-21 RX ADMIN — AMLODIPINE BESYLATE 10 MG: 5 TABLET ORAL at 09:19

## 2023-12-21 RX ADMIN — ENOXAPARIN SODIUM 40 MG: 100 INJECTION SUBCUTANEOUS at 09:18

## 2023-12-21 RX ADMIN — ROSUVASTATIN 5 MG: 10 TABLET, FILM COATED ORAL at 21:12

## 2023-12-21 RX ADMIN — ASPIRIN 81 MG CHEWABLE TABLET 81 MG: 81 TABLET CHEWABLE at 09:19

## 2023-12-21 RX ADMIN — MEMANTINE HYDROCHLORIDE 5 MG: 5 TABLET, FILM COATED ORAL at 21:12

## 2023-12-21 RX ADMIN — DONEPEZIL HYDROCHLORIDE 10 MG: 10 TABLET, FILM COATED ORAL at 21:12

## 2023-12-21 RX ADMIN — HYDROCHLOROTHIAZIDE 12.5 MG: 25 TABLET ORAL at 09:18

## 2023-12-21 NOTE — CARE COORDINATION
Transition Of Care: Therapy now recommending patient to return to independent living at [facility] with home health PT/OT when medically stable. MD agrees with plan. YANICK spoke with sara's son, Rosaline Fothergill ([phone]) and he refuses any home health services at this time and will coordinate with the [facility] if this is needed. YANICK called and left message with Ren Neal ([phone]) about the discharge plan. Son to transport. RUR: 9%  Prior Level of Functioning: Independent living at [facility]  Disposition: Return to Independent living vs SNF. Pending progress with therapy. If SNF or IPR: Date FOC offered: 12/20. Date FOC received: Pending   Accepting facility: Pending   Date authorization started with reference number: No auth with medicare. Follow up appointments: Per attending's recommendations  DME needed: per snf's recommendations. Patient uses a cane baseline   Transportation at discharge: TBD  IM/IMM Medicare/ letter given: No.    Caregiver Contact: Wife: Conrad Stage: [phone]  Discharge Caregiver contacted prior to discharge? Y   Care Conference needed? No   Barriers to discharge: SNF placement.       Christopher Hodgson RN/CRM  [phone]

## 2023-12-21 NOTE — PROGRESS NOTES
[facility] Adult  Hospitalist Group                                                                                          Hospitalist Progress Note  Mary Jo Cantu MD  Office Phone: [phone]        Date of Service:  2023  NAME:  Fatmata Mckeon  :  5669  MRN:  [id number]       Admission Summary:   82 yo man with BPH, CAD s/p stents, dementia, HTN, and insomnia was admitted from [address] [place] [address] as a Code Stroke on 2023 s/p fall, and COVID-19 + test.      Interval history / Subjective:   Pt was seen/examined at bedside in follow up. He was sitting up in a chair and denied any complaints. There have been no labs since .       Assessment & Plan:     S/p fall at home  Concern for TIA  - Neuro following  - CT head and CTA head/neck/perfusion no acute process  - MRI brain ordered since ; still not done  - TTE ordered  - LDL 46 and A1c 5.4%  - continue ASA, statin  - PT/OT recommended HH vs SNF but pt's son declining placement and HH  - UTI was listed on H&P but no     COVID-19 + test (POA)  - pt is asymptomatic  - [facility]  NGTD x2 sets  - satting well on RA     UTI based on UA (POA)  - UC not done on admission (pt with E coli UTI in 2021)  - s/p CTX x1 dose on     HTN  HLD  - continue home meds  - BP elevated due to IVF and acute medical issues; IVF stopped  - follow up with PCP     Dementia  - continue home donepezil and memantine     Code status: Full  Prophylaxis: enoxaparin  Care Plan discussed with: pt, IDR  Anticipated Disposition: Home pending MRI brain  Inpatient  Cardiac monitoring: Telemetry  Central Line:   none         Social Determinants of Health     Tobacco Use: Medium Risk (2023)    Patient History     Smoking Tobacco Use: Former     Smokeless Tobacco Use: Never     Passive Exposure: Not on file   Alcohol Use: Not At Risk (2023)    AUDIT-C     Frequency of Alcohol Consumption: 2-3 times a week     Average Number of Drinks: 1 or 2 12.5 mg Oral Daily    rosuvastatin (CRESTOR) tablet 5 mg  5 mg Oral Nightly    memantine (NAMENDA) tablet 5 mg  5 mg Oral BID    amLODIPine (NORVASC) tablet 10 mg  10 mg Oral Daily    aspirin chewable tablet 81 mg  81 mg Oral Daily     ______________________________________________________________________  EXPECTED LENGTH OF STAY: 3  ACTUAL LENGTH OF STAY:          2                 Miguel Dong MD

## 2023-12-21 NOTE — PROGRESS NOTES
Neurology Progress Note     NAME: Vanessa Rivera   :     MRN:  [id number]   DATE:  2023    Assessment:     Principal Problem:    TIA (transient ischemic attack)  Resolved Problems:    * No resolved hospital problems. *    Patient is an 81year-old male with history of CAD s/p stent and HTN who had BLE weakness noted at his ILF on 23 then was noted by EMS and in ED to have focal LLE weakness. Neurology was subsequently consulted for stroke work-up. Patient clarifies today that he does take and aspirin 81 mg and statin at baseline. He was noted to be COVID positive on admission. He also has a UTI and was started on ceftriaxone. CT head negative. CTA head/neck negative. Last TTE (24) EF 60-65%, negative bubble study. Initial NIHSS 0. EKG Sinus rishi. Weakness resolved and there are no focal motor deficits on exam. Patient states he is baseline with known LLE weakness from lumbar spine disease and pain. LDL 46, A1c 5.4     Transient LLE weakness likely exacerbation of known baseline weakness secondary to COVID+. MRI brain negative for acute infarct. Plan:   - Continue aspirin 81 mg daily  - Continue Crestor 5 mg daily  - Recent TTE (24) ordered per hospitalist team but will likely not show any changes  - Goal normotension  - PT/OT following   - Spoke with patient and son today at bedside.  [facility] for patient discharge. Patient does not need follow-up in the clinic.     Subjective:   See above     Objective:   Chart reviewed since last seen    Current Facility-Administered Medications   Medication Dose Route Frequency    enoxaparin (LOVENOX) injection 40 mg  40 mg SubCUTAneous Daily    donepezil (ARICEPT) tablet 10 mg  10 mg Oral Nightly    finasteride (PROSCAR) tablet 5 mg  5 mg Oral Daily    gabapentin (NEURONTIN) capsule 100 mg  100 mg discussed with:  Patient x   Family x   RN    Care Manager    Consultant/Specialist:       KELLY Covarrubias - NP

## 2023-12-21 NOTE — PLAN OF CARE
Problem: Physical Therapy - Adult  Goal: By Discharge: Performs mobility at highest level of function for planned discharge setting. See evaluation for individualized goals. Description: FUNCTIONAL STATUS PRIOR TO ADMISSION: Modified Independent with SPC in ILF. Uses gym daily to exercise. HOME SUPPORT PRIOR TO ADMISSION: Lives with spouse. Did not require significant assistance with ADLs    Physical Therapy Goals  Initiated 12/20/2023  1. Patient will move from supine to sit and sit to supine in bed with modified independence within 7 day(s). 2.  Patient will perform sit to stand with modified independence within 7 day(s). 3.  Patient will transfer from bed to chair and chair to bed with modified independence using the least restrictive device within 7 day(s). 4.  Patient will ambulate with modified independence for 75 feet with the least restrictive device within 7 day(s). (Distance limited to COVID+ status)  Patient has no stairs in home thus no stair goal.       Outcome: Progressing   PHYSICAL THERAPY TREATMENT    Patient: Erlinda Lyon (63 y.o. male)  Date: 12/21/2023  Diagnosis: TIA (transient ischemic attack) [G45.9]  Acute cystitis with hematuria [N30.01] TIA (transient ischemic attack)      Precautions: Fall Risk, Bed Alarm (Droplet plus)                    ASSESSMENT:  Patient continues to benefit from skilled PT services and is progressing towards goals. Pt remains on track for discharge to his home setting (independent living). Utilized a rolling walker and had to provide cues for safe use (to keep close to the walker while amb). Also had to provide cues to slow down, pace was accelerated. Posture is flexed which pt and his son report is baseline. Discussed discharge planning with pt and his son recommending HHPT and use of pt's rolling walker upon discharge and to leave it up to the HHPT to determine progression off the rolling walker as appropriate.               PLAN:  Patient

## 2023-12-21 NOTE — PLAN OF CARE
Problem: Occupational Therapy - Adult  Goal: By Discharge: Performs self-care activities at highest level of function for planned discharge setting. See evaluation for individualized goals. Description: FUNCTIONAL STATUS PRIOR TO ADMISSION:  Patient was IND for ADLs and mobility with no AD, lives with his wife in [address] [facility] at [employer]. Assist for IADLs, patient with hx of mild dementia. Occupational Therapy Goals:  Initiated 12/19/2023  1. Patient will perform grooming at the sink with Contact Guard Assist within 7 day(s). 2.  Patient will perform bathing with Minimal Assist within 7 day(s). 3.  Patient will perform lower body dressing with Minimal Assist within 7 day(s). 4.  Patient will perform toilet transfers with Minimal Assist  within 7 day(s). 5.  Patient will perform all aspects of toileting with Minimal Assist within 7 day(s). 6.  Patient will participate in upper extremity therapeutic exercise/activities with Supervision for 10 minutes with rest breaks PRN within 7 day(s). 7.  Patient will utilize energy conservation techniques during functional activities with verbal, visual, and tactile cues within 7 day(s). Outcome: Progressing     OCCUPATIONAL THERAPY TREATMENT  Patient: Trena Marie (89 y.o. male)  Date: 12/21/2023  Primary Diagnosis: TIA (transient ischemic attack) [G45.9]  Acute cystitis with hematuria [N30.01]       Precautions: Fall Risk, Bed Alarm (Droplet plus)                Chart, occupational therapy assessment, plan of care, and goals were reviewed. ASSESSMENT  Patient continues to benefit from skilled OT services and is progressing towards goals. Pt received to OT services semi-reclined in bed, amendable to session. VSS throughout session, on RA. Required overall SPV for bed mobility and CGA with RW for functional mobility for dynamic standing balance. Pt completed all aspects of toileting seated and hand washing standing at sink with CGA.  Required VCs for RW Sit: Supervision     Transfers:   Transfer Training  Sit to Stand: Contact-guard assistance  Stand to Sit: Contact-guard assistance           Balance:  Standing: Impaired  Balance  Sitting: Intact  Standing: Impaired  Standing - Static: Constant support;Good  Standing - Dynamic: Constant support; Fair      ADL Intervention:    Grooming: . - Face Washing : Contact Guard Assistance and Standing at [facility]: . - Bladder Hygiene : Contact Guard Assistance and Seated   - Clothing Management : [address] and Seated    Pain Rating:  Reported some back pain, did not quantify   Pain Intervention(s):   repositioning      Activity Tolerance:   Good  Please refer to the flowsheet for vital signs taken during this treatment. After treatment:   Patient left in no apparent distress sitting up in chair, Call bell within reach, Bed/ chair alarm activated, and Caregiver / family present    COMMUNICATION/EDUCATION:   The patient's plan of care was discussed with: physical therapist and registered nurse    Patient Education  Education Given To: Patient; Family  Education Provided: Role of Therapy;Plan of Care;ADL Adaptive Strategies;Transfer Training;Precautions; Fall Prevention Strategies; Energy Conservation  Education Method: Demonstration;Verbal  Barriers to Learning: Hearing  Education Outcome: Verbalized understanding;Continued education needed;Demonstrated understanding    Thank you for this referral.  Stella Browne OT  Minutes: 28

## 2023-12-22 VITALS
OXYGEN SATURATION: 95 % | SYSTOLIC BLOOD PRESSURE: 136 MMHG | HEIGHT: 68 IN | DIASTOLIC BLOOD PRESSURE: 68 MMHG | RESPIRATION RATE: 16 BRPM | BODY MASS INDEX: 26.23 KG/M2 | WEIGHT: 173.06 LBS | TEMPERATURE: 98.4 F | HEART RATE: 55 BPM

## 2023-12-22 PROBLEM — R29.898 TRANSIENT LEFT LEG WEAKNESS: Status: RESOLVED | Noted: 2023-12-22 | Resolved: 2023-12-22

## 2023-12-22 LAB
COMMENT:: NORMAL
ECHO AO ASC DIAM: 2.8 CM
ECHO AO ASCENDING AORTA INDEX: 1.46 CM/M2
ECHO AO ROOT DIAM: 3.1 CM
ECHO AO ROOT INDEX: 1.61 CM/M2
ECHO AV AREA PEAK VELOCITY: 2.5 CM2
ECHO AV AREA VTI: 2.9 CM2
ECHO AV AREA/BSA PEAK VELOCITY: 1.3 CM2/M2
ECHO AV AREA/BSA VTI: 1.5 CM2/M2
ECHO AV MEAN GRADIENT: 3 MMHG
ECHO AV MEAN VELOCITY: 0.8 M/S
ECHO AV PEAK GRADIENT: 5 MMHG
ECHO AV PEAK VELOCITY: 1.2 M/S
ECHO AV VELOCITY RATIO: 0.83
ECHO AV VTI: 24 CM
ECHO BSA: 1.94 M2
ECHO LA DIAMETER INDEX: 1.46 CM/M2
ECHO LA DIAMETER: 2.8 CM
ECHO LA TO AORTIC ROOT RATIO: 0.9
ECHO LV E' LATERAL VELOCITY: 6 CM/S
ECHO LV E' SEPTAL VELOCITY: 7 CM/S
ECHO LV FRACTIONAL SHORTENING: 20 % (ref 28–44)
ECHO LV INTERNAL DIMENSION DIASTOLE INDEX: 2.55 CM/M2
ECHO LV INTERNAL DIMENSION DIASTOLIC: 4.9 CM (ref 4.2–5.9)
ECHO LV INTERNAL DIMENSION SYSTOLIC INDEX: 2.03 CM/M2
ECHO LV INTERNAL DIMENSION SYSTOLIC: 3.9 CM
ECHO LV IVSD: 0.9 CM (ref 0.6–1)
ECHO LV MASS 2D: 164.3 G (ref 88–224)
ECHO LV MASS INDEX 2D: 85.6 G/M2 (ref 49–115)
ECHO LV POSTERIOR WALL DIASTOLIC: 1 CM (ref 0.6–1)
ECHO LV RELATIVE WALL THICKNESS RATIO: 0.41
ECHO LVOT AREA: 2.8 CM2
ECHO LVOT AV VTI INDEX: 0.99
ECHO LVOT DIAM: 1.9 CM
ECHO LVOT MEAN GRADIENT: 2 MMHG
ECHO LVOT PEAK GRADIENT: 4 MMHG
ECHO LVOT PEAK VELOCITY: 1 M/S
ECHO LVOT STROKE VOLUME INDEX: 35.1 ML/M2
ECHO LVOT SV: 67.4 ML
ECHO LVOT VTI: 23.8 CM
ECHO MV A VELOCITY: 0.69 M/S
ECHO MV AREA PHT: 3.4 CM2
ECHO MV E DECELERATION TIME (DT): 225.9 MS
ECHO MV E VELOCITY: 0.62 M/S
ECHO MV E/A RATIO: 0.9
ECHO MV E/E' LATERAL: 10.33
ECHO MV E/E' RATIO (AVERAGED): 9.6
ECHO MV PRESSURE HALF TIME (PHT): 65.5 MS
ECHO PV MAX VELOCITY: 0.6 M/S
ECHO PV PEAK GRADIENT: 2 MMHG
ECHO RV FREE WALL PEAK S': 13 CM/S
ECHO RV INTERNAL DIMENSION: 3.7 CM
ECHO RV LONGITUDINAL DIMENSION: 6 CM
ECHO RV MID DIMENSION: 2.5 CM
ECHO RV TAPSE: 2.5 CM (ref 1.7–?)
SPECIMEN HOLD: NORMAL

## 2023-12-22 PROCEDURE — 6370000000 HC RX 637 (ALT 250 FOR IP): Performed by: NURSE PRACTITIONER

## 2023-12-22 PROCEDURE — 93306 TTE W/DOPPLER COMPLETE: CPT | Performed by: SPECIALIST

## 2023-12-22 PROCEDURE — 36415 COLL VENOUS BLD VENIPUNCTURE: CPT

## 2023-12-22 PROCEDURE — 6360000002 HC RX W HCPCS: Performed by: HOSPITALIST

## 2023-12-22 PROCEDURE — 6370000000 HC RX 637 (ALT 250 FOR IP): Performed by: HOSPITALIST

## 2023-12-22 RX ORDER — ASPIRIN 81 MG/1
81 TABLET, CHEWABLE ORAL DAILY
Qty: 30 TABLET | Refills: 0 | Status: SHIPPED | OUTPATIENT
Start: 2023-12-23

## 2023-12-22 RX ADMIN — GABAPENTIN 100 MG: 100 CAPSULE ORAL at 09:31

## 2023-12-22 RX ADMIN — ASPIRIN 81 MG CHEWABLE TABLET 81 MG: 81 TABLET CHEWABLE at 09:31

## 2023-12-22 RX ADMIN — ENOXAPARIN SODIUM 40 MG: 100 INJECTION SUBCUTANEOUS at 09:31

## 2023-12-22 RX ADMIN — HYDROCHLOROTHIAZIDE 12.5 MG: 25 TABLET ORAL at 09:30

## 2023-12-22 RX ADMIN — FINASTERIDE 5 MG: 5 TABLET, FILM COATED ORAL at 09:31

## 2023-12-22 RX ADMIN — AMLODIPINE BESYLATE 10 MG: 5 TABLET ORAL at 09:31

## 2023-12-22 RX ADMIN — MEMANTINE HYDROCHLORIDE 5 MG: 5 TABLET, FILM COATED ORAL at 09:31

## 2023-12-22 NOTE — PROGRESS NOTES
Referral source:   Tosin Crew at [facility] in [facility] [address].  attended rounds in the CCU as part of the Interdisciplinary team where the patient's ongoing care was discussed. I reviewed the medical record as part of this encounter. Outcome: Interdisciplinary team are aware of  availability and were encouraged to request support as needed. Advised nurse to contact [address] [place] [address] for any further referrals. The  on-call can be reached at [phone]). Rev.  Froilan Cummings MDiv, [facility]  Staff

## 2023-12-22 NOTE — ADT AUTH CERT
UPDATED PROGRESS NOTES 23    Patient Information    Patient Name   Monique Daniel Legal Sex   Male    1935 [facility]        Progress Notes by Miguel Dong MD at 2023  1:49 PM    Author: Miguel Dong MD Service: Internal Medicine Author Type: Physician   Filed: 2023  7:24 PM Date of Service: 2023  1:49 PM Status: Signed   : Miguel Dong MD (Physician)                                                                                                                                                                                                                                                                                    Annalisa Panda. [facility] Adult  Hospitalist Group                                                                                                                                                  Hospitalist Progress Note  Miguel Dong MD  Office Phone: [phone]         Date of Service:  2023  NAME:  Monique Daniel  :  987  MRN:  [id number]        Admission Summary:   80 yo man with BPH, CAD s/p stents, dementia, HTN, and insomnia was admitted from [address] [place] [address] as a Code Stroke on 2023 s/p fall, and COVID-19 + test.       Interval history / Subjective:   Pt was seen/examined at bedside in follow up. He was sitting up in a chair and denied any complaints. There have been no labs since .        Assessment & Plan:      S/p fall at home  Concern for TIA  - Neuro following  - CT head and CTA head/neck/perfusion no acute process  - MRI brain ordered since ; still not done  - TTE ordered  - LDL 46 and A1c 5.4%  - continue ASA, statin  - PT/OT recommended HH vs SNF but pt's son declining placement and HH  - UTI was listed on H&P but no      COVID-19 + test (POA)  - pt is asymptomatic  - [facility]  NGTD x2 sets  - satting well on RA     UTI based on UA (POA)  - UC not done on admission (pt with E coli UTI in 2021)  - s/p
anatomy. The origins of the innominate,  left common carotid and left subclavian arteries are patent. .  There is  0%stenosis in the right internal carotid artery utilizing NASCET  criteria. There is  0%stenosis in the left internal carotid artery utilizing NASCET  criteria. CTA HEAD:  Dominant left vertebral artery. A1 segments are present bilaterally. . The  basilar artery and its branches are normal. The internal carotid, anterior  cerebral, and middle cerebral arteries are patent. There is no flow-limiting  intracranial stenosis. There is no aneurysm. There are no sizable posterior  communicating arteries. There is minimal chronic microvascular change. CT PERFUSION: No evidence of perfusion mismatch. No evidence of reversible  ischemia. R CBF<30% :0  Tmax >6s: 0     Impression  No acute intracranial process. There is no major vessel occlusion. There is no hemodynamically significant stenosis, aneurysm or dissection  identified. .        Care Plan discussed with:  Patient x   Family x   RN     Care Manager     Consultant/Specialist:         KELLY Garcia - JOHNSON                                   Chart Review Routing History    No routing history on file.

## 2023-12-22 NOTE — DISCHARGE INSTRUCTIONS
You were diagnosed with transient left leg weakness likely due to COVID-19 infection. Please isolate for 10 days. Take medications as prescribed. Keep appointments as recommended/scheduled. Diet: heart healthy.   Activity: as tolerated; isolation for 10 days

## 2023-12-22 NOTE — PLAN OF CARE
Problem: Discharge Planning  Goal: Discharge to home or other facility with appropriate resources  Outcome: Adequate for Discharge     Problem: Pain  Goal: Verbalizes/displays adequate comfort level or baseline comfort level  Outcome: Adequate for Discharge     Problem: Safety - Adult  Goal: Free from fall injury  Outcome: Adequate for Discharge     Problem: Skin/Tissue Integrity  Goal: Absence of new skin breakdown  Description: 1. Monitor for areas of redness and/or skin breakdown  2. Assess vascular access sites hourly  3. Every 4-6 hours minimum:  Change oxygen saturation probe site  4. Every 4-6 hours:  If on nasal continuous positive airway pressure, respiratory therapy assess nares and determine need for appliance change or resting period. Outcome: Adequate for Discharge     Problem: ABCDS Injury Assessment  Goal: Absence of physical injury  Outcome: Adequate for Discharge      Pt given discharge paperwork. Reviewed med updates, BEFAST and After Visit Report. Discussed follow-up visits and informed pt of where and how to get meds. Removed lines/leads, packed pt belongings and wheeled to discharge. RN tried to call report 4 times with no success.

## 2023-12-22 NOTE — DISCHARGE SUMMARY
pending: TTE 12/21 report    FOLLOW UP APPOINTMENTS:    Follow-up Information       Follow up With Specialties Details Why Contact Info    Laine Leon MD Internal Medicine Follow up in 1 week(s) 1-2 weeks or first available [address]  [phone]                ADDITIONAL CARE RECOMMENDATIONS:   You were diagnosed with transient left leg weakness likely due to COVID-19 infection. Please isolate for 10 days. Take medications as prescribed. Keep appointments as recommended/scheduled. Diet: heart healthy.   Activity: as tolerated; isolation for 10 days    DIET: heart healthy    ACTIVITY: as tolerated; COVID isolation x10 days    WOUND CARE: none    EQUIPMENT needed: none      DISCHARGE MEDICATIONS:     Medication List        START taking these medications      aspirin 81 MG chewable tablet  Take 1 tablet by mouth daily  Start taking on: December 23, 2023            CONTINUE taking these medications      diclofenac sodium 1 % Gel  Commonly known as: VOLTAREN     donepezil 10 MG tablet  Commonly known as: ARICEPT  Take 1 tablet by mouth nightly     felodipine 10 MG extended release tablet  Commonly known as: PLENDIL  TAKE 1 TABLET EVERY DAY     finasteride 5 MG tablet  Commonly known as: PROSCAR     gabapentin 100 MG capsule  Commonly known as: NEURONTIN     hydroCHLOROthiazide 12.5 MG tablet  Commonly known as: HYDRODIURIL  TAKE 1 TABLET EVERY DAY     MELATONIN PO     memantine ER 14 MG Cp24 extended release capsule  Commonly known as: Namenda XR  Take 1 capsule by mouth daily     rosuvastatin 5 MG tablet  Commonly known as: CRESTOR  Take 1 tablet by mouth nightly     tiZANidine 2 MG tablet  Commonly known as: ZANAFLEX  Take 1 tablet by mouth daily as needed (pain)            STOP taking these medications      diclofenac 75 MG EC tablet  Commonly known as: VOLTAREN               Where to Get Your Medications        These medications were sent to [facility]/pharmacy #[id number]- care    Signed:   Vivian Loaiza MD  12/22/2023  12:46 PM

## 2023-12-22 NOTE — PROGRESS NOTES
.  Transition Of Care: The patient is discharging back to [facility] with son, Cornelius Stringer to transport. The patient refuses home health services to be set up. Facility uses [facility] and liaison, Shyam notified of discharge and faxed clinicals to [phone]. Patient and son aware the patient is to quarantine for covid in independent living upon return. RN to call report to: [phone] per request from Ofe at [facility]. Son to transport. RUR: 8%  Prior Level of Functioning: Independent living at [facility]  Disposition: Return to Independent living vs SNF. Pending progress with therapy. If SNF or IPR: Date FOC offered: 12/20. Date FOC received: Pending   Accepting facility: Pending   Date authorization started with reference number: No auth with medicare. Follow up appointments: Per attending's recommendations  DME needed: per snf's recommendations. Patient uses a cane baseline   Transportation at discharge: TBD  IM/IMM Medicare/ letter given: Yes, 12/22. Caregiver Contact: Wife: See Marcos: [phone], son: Cornelius Stringer: [phone]  Discharge Caregiver contacted prior to discharge? Y   Care Conference needed? No   Barriers to discharge: SNF placement.       Sandford Fothergill RN/CRM  [phone]

## 2023-12-24 LAB
BACTERIA SPEC CULT: NORMAL
BACTERIA SPEC CULT: NORMAL
SERVICE CMNT-IMP: NORMAL
SERVICE CMNT-IMP: NORMAL

## 2024-01-11 DIAGNOSIS — I25.10 CORONARY ARTERY DISEASE INVOLVING NATIVE CORONARY ARTERY OF NATIVE HEART WITHOUT ANGINA PECTORIS: ICD-10-CM

## 2024-01-11 DIAGNOSIS — I10 PRIMARY HYPERTENSION: ICD-10-CM

## 2024-01-11 LAB
ALBUMIN SERPL-MCNC: 3.6 G/DL (ref 3.5–5)
ALBUMIN/GLOB SERPL: 1.1 (ref 1.1–2.2)
ALP SERPL-CCNC: 101 U/L (ref 45–117)
ALT SERPL-CCNC: 24 U/L (ref 12–78)
ANION GAP SERPL CALC-SCNC: 7 MMOL/L (ref 5–15)
AST SERPL-CCNC: 22 U/L (ref 15–37)
BILIRUB SERPL-MCNC: 0.8 MG/DL (ref 0.2–1)
BUN SERPL-MCNC: 15 MG/DL (ref 6–20)
BUN/CREAT SERPL: 19 (ref 12–20)
CALCIUM SERPL-MCNC: 9.4 MG/DL (ref 8.5–10.1)
CHLORIDE SERPL-SCNC: 109 MMOL/L (ref 97–108)
CHOLEST SERPL-MCNC: 165 MG/DL
CO2 SERPL-SCNC: 26 MMOL/L (ref 21–32)
CREAT SERPL-MCNC: 0.81 MG/DL (ref 0.7–1.3)
ERYTHROCYTE [DISTWIDTH] IN BLOOD BY AUTOMATED COUNT: 13.3 % (ref 11.5–14.5)
GLOBULIN SER CALC-MCNC: 3.4 G/DL (ref 2–4)
GLUCOSE SERPL-MCNC: 101 MG/DL (ref 65–100)
HCT VFR BLD AUTO: 48.6 % (ref 36.6–50.3)
HDLC SERPL-MCNC: 77 MG/DL
HDLC SERPL: 2.1 (ref 0–5)
HGB BLD-MCNC: 15.7 G/DL (ref 12.1–17)
LDLC SERPL CALC-MCNC: 71.8 MG/DL (ref 0–100)
MCH RBC QN AUTO: 32.4 PG (ref 26–34)
MCHC RBC AUTO-ENTMCNC: 32.3 G/DL (ref 30–36.5)
MCV RBC AUTO: 100.4 FL (ref 80–99)
NRBC # BLD: 0 K/UL (ref 0–0.01)
NRBC BLD-RTO: 0 PER 100 WBC
PLATELET # BLD AUTO: 299 K/UL (ref 150–400)
PMV BLD AUTO: 9.9 FL (ref 8.9–12.9)
POTASSIUM SERPL-SCNC: 4.6 MMOL/L (ref 3.5–5.1)
PROT SERPL-MCNC: 7 G/DL (ref 6.4–8.2)
RBC # BLD AUTO: 4.84 M/UL (ref 4.1–5.7)
SODIUM SERPL-SCNC: 142 MMOL/L (ref 136–145)
TRIGL SERPL-MCNC: 81 MG/DL
VLDLC SERPL CALC-MCNC: 16.2 MG/DL
WBC # BLD AUTO: 6.9 K/UL (ref 4.1–11.1)

## 2024-01-12 NOTE — RESULT ENCOUNTER NOTE
Letter sent to patient.  All labs are stable or at goal for him.  Labs ordered 12/11 and completed on 1/11.  He had been admitted on 12/18 for possible TIA, so these labs were done at that time.

## 2024-01-16 NOTE — TELEPHONE ENCOUNTER
Reason for call:  Spoke with wife, she requested a refill for med  Diclofenac 75 mg     Send to   [facility]/pharmacy #[id number] - [place], [place] - [address] -  [phone] - F [phone]     Is this a new problem: Yes    Date of last appointment:  12/11/2023     Can we respond via [employer]: No    Best call back number: Ida Miller   [phone]

## 2024-01-16 NOTE — TELEPHONE ENCOUNTER
No chief complaint on file.    Last Appointment with Dr. Marlon Dietrich:  12/11/23    Future Appointments   Date Time Provider Department Center   5/6/2024  9:40 AM Beth Ralph MD [facility]

## 2024-01-17 NOTE — TELEPHONE ENCOUNTER
Future Appointments   Date Time Provider Department Center   5/6/2024  9:40 AM Beth Ralph MD [facility]

## 2024-02-01 DIAGNOSIS — G31.84 MCI (MILD COGNITIVE IMPAIRMENT) WITH MEMORY LOSS: ICD-10-CM

## 2024-02-01 RX ORDER — FELODIPINE 10 MG/1
TABLET, EXTENDED RELEASE ORAL
Qty: 90 TABLET | Refills: 3 | Status: SHIPPED | OUTPATIENT
Start: 2024-02-01

## 2024-02-01 RX ORDER — MEMANTINE HYDROCHLORIDE 21 MG/1
21 CAPSULE, EXTENDED RELEASE ORAL DAILY
Qty: 90 CAPSULE | Refills: 0 | Status: SHIPPED | OUTPATIENT
Start: 2024-02-01

## 2024-02-01 RX ORDER — HYDROCHLOROTHIAZIDE 12.5 MG/1
TABLET ORAL
Qty: 90 TABLET | Refills: 3 | Status: SHIPPED | OUTPATIENT
Start: 2024-02-01

## 2024-02-06 NOTE — TELEPHONE ENCOUNTER
Reason for call:  Spoke with pharmacies from [facility].  She was calling in regards med Diclofenac 75 mg request.    Is this a new problem: Yes    Date of last appointment:  12/11/2023     Can we respond via [employer]: No    Best call back number: [facility] - [place], [place] - [address] - P [phone] - F [phone] (Pharmacy)

## 2024-02-07 NOTE — TELEPHONE ENCOUNTER
Future Appointments   Date Time Provider Department Center   5/6/2024  9:40 AM Beth Ralph MD [facility]   6/18/2024  3:00 PM Marlon Dietrich MD [facility]

## 2024-02-07 NOTE — TELEPHONE ENCOUNTER
Spoke with pt's spouse Ida (on HIPAA) regarding refill request. Pt's spouse reported pt is currently taking Diclofenac 50mg 1 tab BID, requested refill to go to mail order company.

## 2024-02-07 NOTE — TELEPHONE ENCOUNTER
Refil request says 75mg.  Initial prescription says 50mg.  Which do they want?    50mg - 1 tab TID as needed  75mg - 1 tab BID as needed

## 2024-04-10 RX ORDER — FINASTERIDE 5 MG/1
5 TABLET, FILM COATED ORAL DAILY
Qty: 90 TABLET | Refills: 3 | Status: SHIPPED | OUTPATIENT
Start: 2024-04-10

## 2024-04-10 NOTE — TELEPHONE ENCOUNTER
Chief Complaint   Patient presents with    Medication Refill     Last Appointment with Dr. Marlon Dietrich:  12/11/23    Future Appointments   Date Time Provider Department Center   5/6/2024  9:40 AM Beth Ralph MD [facility]   6/18/2024  3:00 PM Marlon Dietrich MD [facility]     [facility]

## 2024-05-06 ENCOUNTER — OFFICE VISIT (OUTPATIENT)
Age: 89
End: 2024-05-06
Payer: MEDICARE

## 2024-05-06 VITALS
WEIGHT: 167.6 LBS | HEIGHT: 67 IN | BODY MASS INDEX: 26.3 KG/M2 | OXYGEN SATURATION: 95 % | SYSTOLIC BLOOD PRESSURE: 120 MMHG | HEART RATE: 59 BPM | RESPIRATION RATE: 16 BRPM | DIASTOLIC BLOOD PRESSURE: 70 MMHG

## 2024-05-06 DIAGNOSIS — I25.10 CORONARY ARTERY DISEASE INVOLVING NATIVE CORONARY ARTERY OF NATIVE HEART WITHOUT ANGINA PECTORIS: ICD-10-CM

## 2024-05-06 DIAGNOSIS — I25.2 HISTORY OF ST ELEVATION MYOCARDIAL INFARCTION (STEMI): ICD-10-CM

## 2024-05-06 DIAGNOSIS — G31.84 MCI (MILD COGNITIVE IMPAIRMENT) WITH MEMORY LOSS: ICD-10-CM

## 2024-05-06 DIAGNOSIS — R00.1 BRADYCARDIA, UNSPECIFIED: Primary | ICD-10-CM

## 2024-05-06 DIAGNOSIS — E78.00 PURE HYPERCHOLESTEROLEMIA, UNSPECIFIED: ICD-10-CM

## 2024-05-06 DIAGNOSIS — I10 ESSENTIAL (PRIMARY) HYPERTENSION: ICD-10-CM

## 2024-05-06 PROCEDURE — 99214 OFFICE O/P EST MOD 30 MIN: CPT | Performed by: SPECIALIST

## 2024-05-06 PROCEDURE — G8428 CUR MEDS NOT DOCUMENT: HCPCS | Performed by: SPECIALIST

## 2024-05-06 PROCEDURE — 1036F TOBACCO NON-USER: CPT | Performed by: SPECIALIST

## 2024-05-06 PROCEDURE — G8419 CALC BMI OUT NRM PARAM NOF/U: HCPCS | Performed by: SPECIALIST

## 2024-05-06 PROCEDURE — 1123F ACP DISCUSS/DSCN MKR DOCD: CPT | Performed by: SPECIALIST

## 2024-05-06 ASSESSMENT — PATIENT HEALTH QUESTIONNAIRE - PHQ9
SUM OF ALL RESPONSES TO PHQ QUESTIONS 1-9: 0
1. LITTLE INTEREST OR PLEASURE IN DOING THINGS: NOT AT ALL
2. FEELING DOWN, DEPRESSED OR HOPELESS: NOT AT ALL
SUM OF ALL RESPONSES TO PHQ9 QUESTIONS 1 & 2: 0
SUM OF ALL RESPONSES TO PHQ QUESTIONS 1-9: 0

## 2024-05-06 NOTE — PROGRESS NOTES
CREATININE 0.81 01/11/2024 09:38 AM    GLUCOSE 101 01/11/2024 09:38 AM    CALCIUM 9.4 01/11/2024 09:38 AM    LABGLOM >60 01/11/2024 09:38 AM    LABGLOM >60 12/20/2022 03:00 PM       Wt Readings from Last 3 Encounters:   05/06/24 76 kg (167 lb 9.6 oz)   12/22/23 78.5 kg (173 lb 1 oz)   12/11/23 77.4 kg (170 lb 9.6 oz)      BP Readings from Last 3 Encounters:   05/06/24 120/70   12/22/23 136/68   12/11/23 123/64        Current Outpatient Medications:     finasteride (PROSCAR) 5 MG tablet, TAKE 1 TABLET EVERY DAY, Disp: 90 tablet, Rfl: 3    memantine ER (NAMENDA XR) 21 MG CP24 extended release capsule, Take 1 capsule by mouth daily, Disp: 90 capsule, Rfl: 0    hydroCHLOROthiazide 12.5 MG tablet, TAKE 1 TABLET EVERY DAY, Disp: 90 tablet, Rfl: 3    felodipine (PLENDIL) 10 MG extended release tablet, TAKE 1 TABLET EVERY DAY, Disp: 90 tablet, Rfl: 3    aspirin 81 MG chewable tablet, Take 1 tablet by mouth daily, Disp: 30 tablet, Rfl: 0    tiZANidine (ZANAFLEX) 2 MG tablet, Take 1 tablet by mouth daily as needed (pain), Disp: 30 tablet, Rfl: 0    donepezil (ARICEPT) 10 MG tablet, Take 1 tablet by mouth nightly, Disp: 10 tablet, Rfl: 0    rosuvastatin (CRESTOR) 5 MG tablet, Take 1 tablet by mouth nightly, Disp: 1 tablet, Rfl: 0    MELATONIN PO, Take 3 mg by mouth, Disp: , Rfl:     diclofenac sodium (VOLTAREN) 1 % GEL, Apply topically daily, Disp: , Rfl:     diclofenac (VOLTAREN) 50 MG EC tablet, Take 1 tablet by mouth 2 times daily (Patient not taking: Reported on 5/6/2024), Disp: 180 tablet, Rfl: 1    gabapentin (NEURONTIN) 100 MG capsule, Take 1 capsule by mouth daily. Max Daily Amount: 100 mg (Patient not taking: Reported on 5/6/2024), Disp: , Rfl:    Impression see above.

## 2024-05-25 DIAGNOSIS — I25.10 CORONARY ARTERY DISEASE INVOLVING NATIVE CORONARY ARTERY OF NATIVE HEART WITHOUT ANGINA PECTORIS: ICD-10-CM

## 2024-05-28 RX ORDER — ROSUVASTATIN CALCIUM 5 MG/1
5 TABLET, COATED ORAL NIGHTLY
Qty: 90 TABLET | Refills: 3 | Status: SHIPPED | OUTPATIENT
Start: 2024-05-28

## 2024-05-28 NOTE — TELEPHONE ENCOUNTER
Per VO by MD.    Future Appointments   Date Time Provider Department Center   6/18/2024  3:00 PM Marlon Dietrich MD [facility]   5/5/2025 10:20 AM Beth Ralph MD [facility]

## 2024-06-01 DIAGNOSIS — G31.84 MCI (MILD COGNITIVE IMPAIRMENT) WITH MEMORY LOSS: ICD-10-CM

## 2024-06-02 RX ORDER — MEMANTINE HYDROCHLORIDE 21 MG/1
21 CAPSULE, EXTENDED RELEASE ORAL DAILY
Qty: 90 CAPSULE | Refills: 0 | Status: SHIPPED | OUTPATIENT
Start: 2024-06-02

## 2024-06-18 ENCOUNTER — OFFICE VISIT (OUTPATIENT)
Age: 89
End: 2024-06-18
Payer: MEDICARE

## 2024-06-18 VITALS
HEIGHT: 67 IN | SYSTOLIC BLOOD PRESSURE: 136 MMHG | BODY MASS INDEX: 26.06 KG/M2 | OXYGEN SATURATION: 97 % | HEART RATE: 70 BPM | WEIGHT: 166 LBS | TEMPERATURE: 97 F | RESPIRATION RATE: 16 BRPM | DIASTOLIC BLOOD PRESSURE: 70 MMHG

## 2024-06-18 DIAGNOSIS — M54.50 CHRONIC MIDLINE LOW BACK PAIN WITHOUT SCIATICA: ICD-10-CM

## 2024-06-18 DIAGNOSIS — G89.29 CHRONIC MIDLINE LOW BACK PAIN WITHOUT SCIATICA: ICD-10-CM

## 2024-06-18 DIAGNOSIS — I10 PRIMARY HYPERTENSION: ICD-10-CM

## 2024-06-18 DIAGNOSIS — I25.10 CORONARY ARTERY DISEASE INVOLVING NATIVE CORONARY ARTERY OF NATIVE HEART WITHOUT ANGINA PECTORIS: ICD-10-CM

## 2024-06-18 DIAGNOSIS — G31.84 MCI (MILD COGNITIVE IMPAIRMENT) WITH MEMORY LOSS: Primary | ICD-10-CM

## 2024-06-18 PROCEDURE — 99214 OFFICE O/P EST MOD 30 MIN: CPT | Performed by: INTERNAL MEDICINE

## 2024-06-18 PROCEDURE — 1036F TOBACCO NON-USER: CPT | Performed by: INTERNAL MEDICINE

## 2024-06-18 PROCEDURE — G8427 DOCREV CUR MEDS BY ELIG CLIN: HCPCS | Performed by: INTERNAL MEDICINE

## 2024-06-18 PROCEDURE — G8419 CALC BMI OUT NRM PARAM NOF/U: HCPCS | Performed by: INTERNAL MEDICINE

## 2024-06-18 PROCEDURE — 1123F ACP DISCUSS/DSCN MKR DOCD: CPT | Performed by: INTERNAL MEDICINE

## 2024-06-18 ASSESSMENT — MINI MENTAL STATE EXAM
SAY: READ THE WORDS ON THE PAGE AND THEN DO WHAT IT SAYS. THEN HAND THE PERSON
THE SHEET WITH CLOSE YOUR EYES ON IT. IF THE SUBJECT READS AND DOES NOT CLOSE THEIR EYES, REPEAT UP TO THREE TIMES. SCORE ONLY IF SUBJECT CLOSES EYES.: 1
WHAT CITY/TOWN ARE WE IN?: 1
SAY: I AM GOING TO NAME THREE OBJECTS. WHEN I AM FINISHED, I WANT YOU TO REPEAT
THEM. REMEMBER WHAT THEY ARE BECAUSE I AM GOING TO ASK YOU TO NAME THEM AGAIN IN
A FEW MINUTES.  SAY THE FOLLOWING WORDS SLOWLY AT 1-SECOND INTERVALS - BALL/ CAR/ MAN [ITERATIONS FOR REPEAT ADMINISTRATION]: 1
SAY: PUT THE PAPER DOWN ON THE FLOOR, SCORE IF PAPER IS PLACED BACK ON FLOOR: 1
WHAT MONTH IS THIS?: 1
PLACE DESIGN, ERASER AND PENCIL IN FRONT OF THE PERSON.  SAY:  COPY THIS DESIGN PLEASE.  SHOW: DESIGN. ALLOW: MULTIPLE TRIES. WAIT UNTIL PERSON IS FINISHED AND HANDS IT BACK. SCORE: ONLY FOR DIAGRAM WITH 4-SIDED FIGURE BETWEEN TWO 5-SIDED FIGURES: 1
ASK THE PERSON IF HE IS RIGHT OR LEFT-HANDED. TAKE A PIECE OF PAPER AND HOLD IT UP IN
FRONT OF THE PERSON. SAY: TAKE THIS PAPER IN YOUR RIGHT/LEFT HAND (WHICHEVER IS NON-
DOMINANT), SCORE IF PAPER IS PICKED UP IN CORRECT HAND.: 1
WHAT DAY OF THE WEEK IS THIS?: 1
SHOW: PENCIL [OBJECT] ASK: WHAT IS THIS CALLED?: 1
WHAT STATE [OR PROVINCE] ARE WE IN?: 1
NOW WHAT WERE THE THREE OBJECTS I ASKED YOU TO REMEMBER?: 3
SAY: I WOULD LIKE YOU TO COUNT BACKWARD FROM 100 BY SEVENS: 5
HAND THE PERSON A PENCIL AND PAPER. SAY: WRITE ANY COMPLETE SENTENCE ON THAT
PIECE OF PAPER. (NOTE: THE SENTENCE MUST MAKE SENSE. IGNORE SPELLING ERRORS): 1
WHAT COUNTRY ARE WE IN?: 1
WHAT IS THE NAME OF THIS BUILDING [IN FACILITY]?/WHAT IS THE STREET ADDRESS OF THIS HOUSE [IN HOME]?: 1
WHAT IS TODAY'S DATE?: 0
SAY: I WOULD LIKE YOU TO REPEAT THIS PHRASE AFTER ME: NO IFS, ANDS, OR BUTS.: 1
SUM ALL MMSE QUESTIONS FOR TOTAL SCORE [OUT OF 30].: 27
WHICH SEASON IS THIS?: 1
WHAT YEAR IS THIS?: 1
WHAT FLOOR ARE WE ON [IN FACILITY]?/ WHAT ROOM ARE WE IN [IN HOME]?: 1
SHOW: WRISTWATCH [OBJECT] ASK: WHAT IS THIS CALLED?: 1
SAY: FOLD THE PAPER IN HALF ONCE WITH BOTH HANDS, SCORE IF PAPER IS CORRECTLY FOLDED IN HALF.: 1

## 2024-06-18 ASSESSMENT — ENCOUNTER SYMPTOMS
SHORTNESS OF BREATH: 0
VOMITING: 0
CONSTIPATION: 0
COUGH: 0
BLOOD IN STOOL: 0
NAUSEA: 0
ABDOMINAL PAIN: 0
DIARRHEA: 0

## 2024-06-18 ASSESSMENT — PATIENT HEALTH QUESTIONNAIRE - PHQ9
SUM OF ALL RESPONSES TO PHQ QUESTIONS 1-9: 0
SUM OF ALL RESPONSES TO PHQ QUESTIONS 1-9: 0
2. FEELING DOWN, DEPRESSED OR HOPELESS: NOT AT ALL
SUM OF ALL RESPONSES TO PHQ QUESTIONS 1-9: 0
1. LITTLE INTEREST OR PLEASURE IN DOING THINGS: NOT AT ALL
SUM OF ALL RESPONSES TO PHQ QUESTIONS 1-9: 0
SUM OF ALL RESPONSES TO PHQ9 QUESTIONS 1 & 2: 0

## 2024-06-18 NOTE — PROGRESS NOTES
Neurological:  Negative for dizziness, numbness and headaches.   Psychiatric/Behavioral:  Negative for dysphoric mood and sleep disturbance. The patient is not nervous/anxious.           Physical Exam  Constitutional:       General: He is not in acute distress.  Cardiovascular:      Rate and Rhythm: Regular rhythm.      Heart sounds: No murmur heard.  Pulmonary:      Effort: Pulmonary effort is normal.      Breath sounds: Normal breath sounds. No wheezing.   Abdominal:      General: Bowel sounds are normal. There is no distension.      Palpations: Abdomen is soft.      Tenderness: There is no abdominal tenderness.   Musculoskeletal:      Right lower leg: No edema.      Left lower leg: No edema.   Psychiatric:         Mood and Affect: Mood normal.         Behavior: Behavior normal.        Vitals:    06/18/24 1439   BP: 136/70   Pulse: 70   Resp: 16   Temp: 97 °F (36.1 °C)   TempSrc: Temporal   SpO2: 97%   Weight: 75.3 kg (166 lb)   Height: 1.702 m (5' 7\")      Body mass index is 26 kg/m².     Marlon Dietrich MD

## 2024-06-18 NOTE — ASSESSMENT & PLAN NOTE
well controlled, asymptomatic.  BP is well controlled, lipids are well controlled.  Continue: current plan.

## 2024-06-18 NOTE — ASSESSMENT & PLAN NOTE
Overall stable.  MMSE is slightly worse then last check, 27/30 down from 30/30.  With next refill will plan to max out Namenda at 28mg dose.  His condition is stable.  He is able to provide his own care with wife's supervision.  I don't think skilled nursing level is needed at this time and can remain independent as long as wife is available to supervise.

## 2024-06-18 NOTE — ASSESSMENT & PLAN NOTE
Improved after ablation, continue Tylenol, he will follow up with pain specialist if symptoms return

## 2024-09-04 RX ORDER — DONEPEZIL HYDROCHLORIDE 10 MG/1
10 TABLET, FILM COATED ORAL NIGHTLY
Qty: 90 TABLET | Refills: 1 | Status: SHIPPED | OUTPATIENT
Start: 2024-09-04

## 2024-09-04 NOTE — TELEPHONE ENCOUNTER
Chief Complaint   Patient presents with    Medication Refill     Last Appointment with Dr. Marlon Dietrich: 6/18/24    Future Appointments   Date Time Provider Department Center   12/18/2024 10:20 AM Marlon Dietrich MD [facility]   5/5/2025 10:20 AM Beth Ralph MD [facility]   [facility]

## 2024-09-26 DIAGNOSIS — G31.84 MCI (MILD COGNITIVE IMPAIRMENT) WITH MEMORY LOSS: ICD-10-CM

## 2024-09-26 RX ORDER — MEMANTINE HYDROCHLORIDE 21 MG/1
21 CAPSULE, EXTENDED RELEASE ORAL DAILY
Qty: 90 CAPSULE | Refills: 1 | Status: SHIPPED | OUTPATIENT
Start: 2024-09-26

## 2024-12-09 RX ORDER — FELODIPINE 10 MG/1
TABLET, EXTENDED RELEASE ORAL
Qty: 90 TABLET | Refills: 3 | OUTPATIENT
Start: 2024-12-09

## 2024-12-09 RX ORDER — HYDROCHLOROTHIAZIDE 12.5 MG/1
TABLET ORAL
Qty: 90 TABLET | Refills: 3 | OUTPATIENT
Start: 2024-12-09

## 2024-12-18 ENCOUNTER — OFFICE VISIT (OUTPATIENT)
Age: 89
End: 2024-12-18
Payer: MEDICARE

## 2024-12-18 VITALS
OXYGEN SATURATION: 60 % | DIASTOLIC BLOOD PRESSURE: 67 MMHG | SYSTOLIC BLOOD PRESSURE: 126 MMHG | WEIGHT: 170.6 LBS | RESPIRATION RATE: 16 BRPM | BODY MASS INDEX: 26.78 KG/M2 | HEIGHT: 67 IN | TEMPERATURE: 97 F | HEART RATE: 61 BPM

## 2024-12-18 DIAGNOSIS — I10 PRIMARY HYPERTENSION: ICD-10-CM

## 2024-12-18 DIAGNOSIS — F02.A0 MILD LATE ONSET ALZHEIMER'S DEMENTIA WITHOUT BEHAVIORAL DISTURBANCE, PSYCHOTIC DISTURBANCE, MOOD DISTURBANCE, OR ANXIETY (HCC): ICD-10-CM

## 2024-12-18 DIAGNOSIS — I25.10 CORONARY ARTERY DISEASE INVOLVING NATIVE CORONARY ARTERY OF NATIVE HEART WITHOUT ANGINA PECTORIS: ICD-10-CM

## 2024-12-18 DIAGNOSIS — G30.1 MILD LATE ONSET ALZHEIMER'S DEMENTIA WITHOUT BEHAVIORAL DISTURBANCE, PSYCHOTIC DISTURBANCE, MOOD DISTURBANCE, OR ANXIETY (HCC): ICD-10-CM

## 2024-12-18 DIAGNOSIS — N40.1 BPH ASSOCIATED WITH NOCTURIA: ICD-10-CM

## 2024-12-18 DIAGNOSIS — Z00.00 MEDICARE ANNUAL WELLNESS VISIT, SUBSEQUENT: Primary | ICD-10-CM

## 2024-12-18 DIAGNOSIS — R35.1 BPH ASSOCIATED WITH NOCTURIA: ICD-10-CM

## 2024-12-18 DIAGNOSIS — Z71.89 ADVANCED CARE PLANNING/COUNSELING DISCUSSION: ICD-10-CM

## 2024-12-18 LAB
ALBUMIN SERPL-MCNC: 3.5 G/DL (ref 3.5–5)
ALBUMIN/GLOB SERPL: 1.2 (ref 1.1–2.2)
ALP SERPL-CCNC: 91 U/L (ref 45–117)
ALT SERPL-CCNC: 23 U/L (ref 12–78)
ANION GAP SERPL CALC-SCNC: 3 MMOL/L (ref 2–12)
AST SERPL-CCNC: 20 U/L (ref 15–37)
BILIRUB SERPL-MCNC: 0.6 MG/DL (ref 0.2–1)
BUN SERPL-MCNC: 15 MG/DL (ref 6–20)
BUN/CREAT SERPL: 17 (ref 12–20)
CALCIUM SERPL-MCNC: 9.2 MG/DL (ref 8.5–10.1)
CHLORIDE SERPL-SCNC: 109 MMOL/L (ref 97–108)
CHOLEST SERPL-MCNC: 148 MG/DL
CO2 SERPL-SCNC: 28 MMOL/L (ref 21–32)
CREAT SERPL-MCNC: 0.86 MG/DL (ref 0.7–1.3)
ERYTHROCYTE [DISTWIDTH] IN BLOOD BY AUTOMATED COUNT: 13.1 % (ref 11.5–14.5)
GLOBULIN SER CALC-MCNC: 2.9 G/DL (ref 2–4)
GLUCOSE SERPL-MCNC: 89 MG/DL (ref 65–100)
HCT VFR BLD AUTO: 47.2 % (ref 36.6–50.3)
HDLC SERPL-MCNC: 75 MG/DL
HDLC SERPL: 2 (ref 0–5)
HGB BLD-MCNC: 15.4 G/DL (ref 12.1–17)
LDLC SERPL CALC-MCNC: 45 MG/DL (ref 0–100)
MCH RBC QN AUTO: 33.2 PG (ref 26–34)
MCHC RBC AUTO-ENTMCNC: 32.6 G/DL (ref 30–36.5)
MCV RBC AUTO: 101.7 FL (ref 80–99)
NRBC # BLD: 0 K/UL (ref 0–0.01)
NRBC BLD-RTO: 0 PER 100 WBC
PLATELET # BLD AUTO: 288 K/UL (ref 150–400)
PMV BLD AUTO: 10.6 FL (ref 8.9–12.9)
POTASSIUM SERPL-SCNC: 4.4 MMOL/L (ref 3.5–5.1)
PROT SERPL-MCNC: 6.4 G/DL (ref 6.4–8.2)
RBC # BLD AUTO: 4.64 M/UL (ref 4.1–5.7)
SODIUM SERPL-SCNC: 140 MMOL/L (ref 136–145)
TRIGL SERPL-MCNC: 140 MG/DL
VLDLC SERPL CALC-MCNC: 28 MG/DL
WBC # BLD AUTO: 7.4 K/UL (ref 4.1–11.1)

## 2024-12-18 PROCEDURE — 99214 OFFICE O/P EST MOD 30 MIN: CPT | Performed by: INTERNAL MEDICINE

## 2024-12-18 RX ORDER — MEMANTINE HYDROCHLORIDE 28 MG/1
28 CAPSULE, EXTENDED RELEASE ORAL DAILY
Qty: 90 CAPSULE | Refills: 3 | Status: SHIPPED | OUTPATIENT
Start: 2024-12-18

## 2024-12-18 SDOH — ECONOMIC STABILITY: FOOD INSECURITY: WITHIN THE PAST 12 MONTHS, YOU WORRIED THAT YOUR FOOD WOULD RUN OUT BEFORE YOU GOT MONEY TO BUY MORE.: NEVER TRUE

## 2024-12-18 SDOH — ECONOMIC STABILITY: FOOD INSECURITY: WITHIN THE PAST 12 MONTHS, THE FOOD YOU BOUGHT JUST DIDN'T LAST AND YOU DIDN'T HAVE MONEY TO GET MORE.: NEVER TRUE

## 2024-12-18 SDOH — ECONOMIC STABILITY: INCOME INSECURITY: HOW HARD IS IT FOR YOU TO PAY FOR THE VERY BASICS LIKE FOOD, HOUSING, MEDICAL CARE, AND HEATING?: NOT HARD AT ALL

## 2024-12-18 ASSESSMENT — PATIENT HEALTH QUESTIONNAIRE - PHQ9
SUM OF ALL RESPONSES TO PHQ QUESTIONS 1-9: 0
SUM OF ALL RESPONSES TO PHQ9 QUESTIONS 1 & 2: 0
1. LITTLE INTEREST OR PLEASURE IN DOING THINGS: NOT AT ALL
2. FEELING DOWN, DEPRESSED OR HOPELESS: NOT AT ALL

## 2024-12-18 ASSESSMENT — ENCOUNTER SYMPTOMS
BACK PAIN: 1
COUGH: 0
CONSTIPATION: 0
DIARRHEA: 0
SHORTNESS OF BREATH: 0
BLOOD IN STOOL: 0
ABDOMINAL PAIN: 0
VOMITING: 0
NAUSEA: 0

## 2024-12-18 ASSESSMENT — MINI MENTAL STATE EXAM
WHICH SEASON IS THIS?: 1
WHAT COUNTRY ARE WE IN?: 1
PLACE DESIGN, ERASER AND PENCIL IN FRONT OF THE PERSON.  SAY:  COPY THIS DESIGN PLEASE.  SHOW: DESIGN. ALLOW: MULTIPLE TRIES. WAIT UNTIL PERSON IS FINISHED AND HANDS IT BACK. SCORE: ONLY FOR DIAGRAM WITH 4-SIDED FIGURE BETWEEN TWO 5-SIDED FIGURES: 1
SUM ALL MMSE QUESTIONS FOR TOTAL SCORE [OUT OF 30].: 24
SAY: FOLD THE PAPER IN HALF ONCE WITH BOTH HANDS, SCORE IF PAPER IS CORRECTLY FOLDED IN HALF.: 1
WHAT DAY OF THE WEEK IS THIS?: 1
SAY: I WOULD LIKE YOU TO COUNT BACKWARD FROM 100 BY SEVENS: 5
WHAT IS THE NAME OF THIS BUILDING [IN FACILITY]?/WHAT IS THE STREET ADDRESS OF THIS HOUSE [IN HOME]?: 1
SAY: I AM GOING TO NAME THREE OBJECTS. WHEN I AM FINISHED, I WANT YOU TO REPEAT
THEM. REMEMBER WHAT THEY ARE BECAUSE I AM GOING TO ASK YOU TO NAME THEM AGAIN IN
A FEW MINUTES.  SAY THE FOLLOWING WORDS SLOWLY AT 1-SECOND INTERVALS - BALL/ CAR/ MAN [ITERATIONS FOR REPEAT ADMINISTRATION]: 1
WHAT FLOOR ARE WE ON [IN FACILITY]?/ WHAT ROOM ARE WE IN [IN HOME]?: 1
WHAT CITY/TOWN ARE WE IN?: 1
WHAT IS TODAY'S DATE?: 0
SHOW: PENCIL [OBJECT] ASK: WHAT IS THIS CALLED?: 1
WHAT YEAR IS THIS?: 1
SAY: PUT THE PAPER DOWN ON THE FLOOR, SCORE IF PAPER IS PLACED BACK ON FLOOR: 1
NOW WHAT WERE THE THREE OBJECTS I ASKED YOU TO REMEMBER?: 0
WHAT STATE [OR PROVINCE] ARE WE IN?: 1
SAY: READ THE WORDS ON THE PAGE AND THEN DO WHAT IT SAYS. THEN HAND THE PERSON
THE SHEET WITH CLOSE YOUR EYES ON IT. IF THE SUBJECT READS AND DOES NOT CLOSE THEIR EYES, REPEAT UP TO THREE TIMES. SCORE ONLY IF SUBJECT CLOSES EYES.: 1
ASK THE PERSON IF HE IS RIGHT OR LEFT-HANDED. TAKE A PIECE OF PAPER AND HOLD IT UP IN
FRONT OF THE PERSON. SAY: TAKE THIS PAPER IN YOUR RIGHT/LEFT HAND (WHICHEVER IS NON-
DOMINANT), SCORE IF PAPER IS PICKED UP IN CORRECT HAND.: 1
WHAT MONTH IS THIS?: 1
SHOW: WRISTWATCH [OBJECT] ASK: WHAT IS THIS CALLED?: 1
HAND THE PERSON A PENCIL AND PAPER. SAY: WRITE ANY COMPLETE SENTENCE ON THAT
PIECE OF PAPER. (NOTE: THE SENTENCE MUST MAKE SENSE. IGNORE SPELLING ERRORS): 1
SAY: I WOULD LIKE YOU TO REPEAT THIS PHRASE AFTER ME: NO IFS, ANDS, OR BUTS.: 1

## 2024-12-18 ASSESSMENT — LIFESTYLE VARIABLES
HAS A RELATIVE, FRIEND, DOCTOR, OR ANOTHER HEALTH PROFESSIONAL EXPRESSED CONCERN ABOUT YOUR DRINKING OR SUGGESTED YOU CUT DOWN: NO
HAVE YOU OR SOMEONE ELSE BEEN INJURED AS A RESULT OF YOUR DRINKING: NO
HOW OFTEN DURING THE LAST YEAR HAVE YOU FAILED TO DO WHAT WAS NORMALLY EXPECTED FROM YOU BECAUSE OF DRINKING: NEVER
HOW OFTEN DO YOU HAVE A DRINK CONTAINING ALCOHOL: 4 OR MORE TIMES A WEEK
HOW OFTEN DURING THE LAST YEAR HAVE YOU HAD A FEELING OF GUILT OR REMORSE AFTER DRINKING: NEVER
HOW OFTEN DURING THE LAST YEAR HAVE YOU NEEDED AN ALCOHOLIC DRINK FIRST THING IN THE MORNING TO GET YOURSELF GOING AFTER A NIGHT OF HEAVY DRINKING: NEVER
HOW OFTEN DURING THE LAST YEAR HAVE YOU BEEN UNABLE TO REMEMBER WHAT HAPPENED THE NIGHT BEFORE BECAUSE YOU HAD BEEN DRINKING: NEVER
HOW OFTEN DURING THE LAST YEAR HAVE YOU FOUND THAT YOU WERE NOT ABLE TO STOP DRINKING ONCE YOU HAD STARTED: NEVER
HOW MANY STANDARD DRINKS CONTAINING ALCOHOL DO YOU HAVE ON A TYPICAL DAY: 1 OR 2

## 2024-12-18 NOTE — ACP (ADVANCE CARE PLANNING)
Advance Care Planning   Advance Care Planning (ACP) Physician/NP/PA (Provider) Conversation    Date of ACP Conversation: 12/18/24  Persons included in Conversation:  patient and spouse  Length of ACP Conversation in minutes: <16 minutes (Non-Billable)    We discussed the patient’s choices for care and treatment preferences in case of a health event that adversely affects decision-making abilities or is life-limiting. We discusses the differences between FULL CODE and DNR and when to consider a change in code status.  The limitations with CPR were reviewed.    Has ACP document(s) on file - reflects the patient's care preferences.  He confirms current DNR status - completed forms on file (place new order if needed)    The patient has appointed the following active healthcare agents:    Primary Decision Maker: Ida Miller - Spouse - [phone]    Secondary Decision Maker: Moshe Miller - Child - [phone]    Secondary Decision Maker: José Miguel Miller - Child - [phone]     Marlon Dietrich MD

## 2024-12-18 NOTE — ASSESSMENT & PLAN NOTE
New dx, MMSE has progressed and wife reports some decline. MMSE is 24/30 today.  Will max out Namenda at 28mg dose.  Reviewed expectations.  Wife reports he is mellow and has no concerns.

## 2024-12-18 NOTE — PROGRESS NOTES
Medicare Annual Wellness Visit    Ramírez Miller is here for Medicare AWV    Assessment & Plan   Medicare annual wellness visit, subsequent  Advanced care planning/counseling discussion  Mild late onset Alzheimer's dementia without behavioral disturbance, psychotic disturbance, mood disturbance, or anxiety (HCC)  Assessment & Plan:  New dx, MMSE has progressed and wife reports some decline. MMSE is 24/30 today.  Will max out Namenda at 28mg dose.  Reviewed expectations.  Wife reports he is mellow and has no concerns.  Orders:  -     memantine ER (NAMENDA XR) 28 MG CP24 extended release capsule; Take 1 capsule by mouth daily, Disp-90 capsule, R-3Normal  Coronary artery disease involving native coronary artery of native heart without angina pectoris  Assessment & Plan:  well controlled, asymptomatic.  BP is well controlled, lipids are well controlled.  Continue: current plan pending review of labs.  Orders:  -     Comprehensive Metabolic Panel; Future  -     CBC; Future  -     Lipid Panel; Future  Primary hypertension  Assessment & Plan:   Chronic, at goal (stable), continue current plan pending work up below  Orders:  -     Comprehensive Metabolic Panel; Future  -     CBC; Future  BPH associated with nocturia  Assessment & Plan:  Stable, is mildly symptomatic, will continue w/ current medications, due to balance issues will not offer an alpha blocker.      Recommendations for Preventive Services Due: see orders and patient instructions/AVS.  Recommended screening schedule for the next 5-10 years is provided to the patient in written form: see Patient Instructions/AVS.     Return in 6 months (on 6/18/2025) for regular follow up.       Subjective   Since last visit: weight is stable.    See ACP Note for Advanced Care Directive Discussion    Health Maintenance  Immunizations:   COVID: up to date  Influenza: up to date  RSV: up to date   Tetanus: up to date    Shingles: up to date   Pneumonia: up to date    Cancer

## 2024-12-18 NOTE — ASSESSMENT & PLAN NOTE
Stable, is mildly symptomatic, will continue w/ current medications, due to balance issues will not offer an alpha blocker.

## 2025-01-29 RX ORDER — FINASTERIDE 5 MG/1
5 TABLET, FILM COATED ORAL DAILY
Qty: 90 TABLET | Refills: 0 | Status: SHIPPED | OUTPATIENT
Start: 2025-01-29

## 2025-01-29 RX ORDER — DONEPEZIL HYDROCHLORIDE 10 MG/1
10 TABLET, FILM COATED ORAL NIGHTLY
Qty: 90 TABLET | Refills: 3 | Status: SHIPPED | OUTPATIENT
Start: 2025-01-29

## 2025-02-02 RX ORDER — HYDROCHLOROTHIAZIDE 12.5 MG/1
TABLET ORAL
Qty: 90 TABLET | Refills: 3 | Status: SHIPPED | OUTPATIENT
Start: 2025-02-02

## 2025-02-02 RX ORDER — FINASTERIDE 5 MG/1
5 TABLET, FILM COATED ORAL DAILY
Qty: 90 TABLET | Refills: 3 | OUTPATIENT
Start: 2025-02-02

## 2025-03-26 DIAGNOSIS — I25.10 CORONARY ARTERY DISEASE INVOLVING NATIVE CORONARY ARTERY OF NATIVE HEART WITHOUT ANGINA PECTORIS: ICD-10-CM

## 2025-03-27 RX ORDER — ROSUVASTATIN CALCIUM 5 MG/1
5 TABLET, COATED ORAL NIGHTLY
Qty: 90 TABLET | Refills: 3 | Status: SHIPPED | OUTPATIENT
Start: 2025-03-27

## 2025-03-27 NOTE — TELEPHONE ENCOUNTER
Per VO by MD.    Future Appointments   Date Time Provider Department Center   5/5/2025 10:20 AM Beth Ralph MD [facility]   6/17/2025 10:20 AM Marlon Dietrich MD [facility] DEP

## 2025-05-09 ENCOUNTER — TELEPHONE (OUTPATIENT)
Age: 89
End: 2025-05-09

## 2025-05-09 RX ORDER — MEMANTINE HYDROCHLORIDE 14 MG/1
14 CAPSULE, EXTENDED RELEASE ORAL DAILY
Qty: 30 CAPSULE | Refills: 0 | Status: SHIPPED | OUTPATIENT
Start: 2025-05-09

## 2025-05-09 NOTE — TELEPHONE ENCOUNTER
Patient's wife, Ida, called.  She is concerned because she thinks that Memantine is making the patient sleepy.  She also believes that the patient's dementia is worsening and he seems to have difficulty participating in conversations.    Ida Miller - [phone]

## 2025-05-09 NOTE — TELEPHONE ENCOUNTER
Worsening dementia is expected if slow.  If acute then could indicate infection or other changes.  He is on max dose of Namenda and it could be making him tired.  It comes in 7mg, 14mg, 21, and 28mg.  He is on 28mg and we can try going to down to 14mg or we can stopping it all together.  I would go down by 1/2 but both are legit options.  If she wants to go to 14mg, okay to send in 30 days, RF 0, to local.

## 2025-05-09 NOTE — TELEPHONE ENCOUNTER
Called and spoke with patient PHI wife Ms. Miller, patient wife agreed to reduce the dosage per Dr. Dietrich recommendation of Namenda 14 mg PO daily, quantity 30 pills, no refill, medication was sent to:    [facility]/pharmacy #[id number] [facility] [address]- [phone] - F [phone]     Thank you  Elsy Nguyen LPN

## 2025-05-15 ENCOUNTER — TELEPHONE (OUTPATIENT)
Age: 89
End: 2025-05-15

## 2025-05-15 DIAGNOSIS — F02.A0 MILD LATE ONSET ALZHEIMER'S DEMENTIA WITHOUT BEHAVIORAL DISTURBANCE, PSYCHOTIC DISTURBANCE, MOOD DISTURBANCE, OR ANXIETY (HCC): Primary | ICD-10-CM

## 2025-05-15 DIAGNOSIS — G30.1 MILD LATE ONSET ALZHEIMER'S DEMENTIA WITHOUT BEHAVIORAL DISTURBANCE, PSYCHOTIC DISTURBANCE, MOOD DISTURBANCE, OR ANXIETY (HCC): Primary | ICD-10-CM

## 2025-05-15 RX ORDER — MEMANTINE HYDROCHLORIDE 14 MG/1
14 CAPSULE, EXTENDED RELEASE ORAL DAILY
Qty: 90 CAPSULE | Refills: 0 | Status: SHIPPED | OUTPATIENT
Start: 2025-05-15

## 2025-05-15 NOTE — TELEPHONE ENCOUNTER
Reason for call:   Please call his  Memantine into [facility] his mail order      Is this a new problem: Yes    Date of last appointment:  12/18/2024     Can we respond via [employer] No    Best call back number:  Cass [phone]

## 2025-05-15 NOTE — TELEPHONE ENCOUNTER
Wife report patient never started lower dose memantine b/c [facility] could not get in stock. Patient has been taking 28 mg, makes patient drowsy. Dementia is progressing. Requesting lower dose to mail order.

## 2025-05-20 ENCOUNTER — OFFICE VISIT (OUTPATIENT)
Age: 89
End: 2025-05-20
Payer: MEDICARE

## 2025-05-20 VITALS
DIASTOLIC BLOOD PRESSURE: 72 MMHG | WEIGHT: 168.4 LBS | HEIGHT: 67 IN | SYSTOLIC BLOOD PRESSURE: 128 MMHG | BODY MASS INDEX: 26.43 KG/M2 | OXYGEN SATURATION: 96 % | HEART RATE: 67 BPM

## 2025-05-20 DIAGNOSIS — R00.1 BRADYCARDIA, UNSPECIFIED: ICD-10-CM

## 2025-05-20 DIAGNOSIS — I25.10 CORONARY ARTERY DISEASE INVOLVING NATIVE CORONARY ARTERY OF NATIVE HEART WITHOUT ANGINA PECTORIS: Primary | ICD-10-CM

## 2025-05-20 DIAGNOSIS — E78.00 PURE HYPERCHOLESTEROLEMIA, UNSPECIFIED: ICD-10-CM

## 2025-05-20 DIAGNOSIS — I10 ESSENTIAL (PRIMARY) HYPERTENSION: ICD-10-CM

## 2025-05-20 DIAGNOSIS — G31.84 MCI (MILD COGNITIVE IMPAIRMENT) WITH MEMORY LOSS: ICD-10-CM

## 2025-05-20 PROCEDURE — 1126F AMNT PAIN NOTED NONE PRSNT: CPT

## 2025-05-20 PROCEDURE — 93005 ELECTROCARDIOGRAM TRACING: CPT

## 2025-05-20 PROCEDURE — 99214 OFFICE O/P EST MOD 30 MIN: CPT

## 2025-05-20 PROCEDURE — 1159F MED LIST DOCD IN RCRD: CPT

## 2025-05-20 PROCEDURE — 1036F TOBACCO NON-USER: CPT

## 2025-05-20 PROCEDURE — 1123F ACP DISCUSS/DSCN MKR DOCD: CPT

## 2025-05-20 PROCEDURE — G8419 CALC BMI OUT NRM PARAM NOF/U: HCPCS

## 2025-05-20 PROCEDURE — G8427 DOCREV CUR MEDS BY ELIG CLIN: HCPCS

## 2025-05-20 PROCEDURE — 93010 ELECTROCARDIOGRAM REPORT: CPT

## 2025-05-20 ASSESSMENT — PATIENT HEALTH QUESTIONNAIRE - PHQ9
SUM OF ALL RESPONSES TO PHQ QUESTIONS 1-9: 0
SUM OF ALL RESPONSES TO PHQ QUESTIONS 1-9: 0
2. FEELING DOWN, DEPRESSED OR HOPELESS: NOT AT ALL
SUM OF ALL RESPONSES TO PHQ QUESTIONS 1-9: 0
SUM OF ALL RESPONSES TO PHQ QUESTIONS 1-9: 0
1. LITTLE INTEREST OR PLEASURE IN DOING THINGS: NOT AT ALL

## 2025-05-20 NOTE — PROGRESS NOTES
1. Have you been to the ER, urgent care clinic since your last visit?  Hospitalized since your last visit?No    2. Have you seen or consulted any other health care providers outside of the [facility] since your last visit?  Include any pap smears or colon screening. No    
nonspecific brain atrophy     Returns today accompanied by his wife.  They are generally doing well.  He has no complaints.  Denies any cardiac symptoms.  Since his hospitalization he has had no issues with shortness of breath.  He overall feels at his baseline.  His memory is stable.  Uses a walker to get around.  Denies chest pain, edema, syncope or shortness of breath at rest   Has no tachycardia , palpitations or sense of arrythmia        Today...  He is overall doing well with no cardiac complaints today. Wife tells me he may be having some fatigue with namenda so they have reduced and will possibly stop medication.    He works out downstairs everyday for about an hour. He primarily does some weights and bicycle. No cardiac symptoms.      Denies chest pain, edema, syncope, shortness of breath at rest, dyspnea on exertion, PND or orthopnea.  Has no tachycardia, palpitations or sense of arrhythmia.     The ASCVD Risk score (Crispin NOONAN, et al., 2019) failed to calculate for the following reasons:    The 2019 ASCVD risk score is only valid for ages 40 to 79    Risk score cannot be calculated because patient has a medical history suggesting prior/existing ASCVD     PAST MEDICAL HISTORY:     Past Medical History:   Diagnosis Date    BPH (benign prostatic hypertrophy)     CAD (coronary artery disease)     Cataract, bilateral 04/06/2016    s/p surgery in 2016    Hypertension     Insomnia 02/11/2015    Shingles         Past Surgical History:   Procedure Laterality Date    APPENDECTOMY      CARDIAC CATHETERIZATION  04/23/2019    STEMI - stent to LAD x 2, stent to Ost Cx to Prox Cx x1    CATARACT REMOVAL Right 04/11/2016    CATARACT REMOVAL Left 04/25/2016    COLONOSCOPY  01/29/2013    normal    KNEE ARTHROSCOPY Right     ORTHOPEDIC SURGERY Right     scar tissue removal after surgery       CURRENT MEDICATIONS:    .  Current Outpatient Medications   Medication Sig Dispense Refill    memantine ER (NAMENDA XR) 14 MG CP24

## 2025-05-20 NOTE — ASSESSMENT & PLAN NOTE
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   Per Dr. Ralph- EF is normal he has no symptoms I do not feel the need for stress test unless we get symptomatic especially at age 88.   Echo December 2023 EF 60-65% normal wall motion  EKG SB at 58 with old anterior infarct- unchanged from previous  Continues to be active with no chest pain or SOB    Orders:    EKG 12 Lead

## 2025-06-11 ENCOUNTER — TELEPHONE (OUTPATIENT)
Age: 89
End: 2025-06-11

## 2025-06-17 ENCOUNTER — OFFICE VISIT (OUTPATIENT)
Age: 89
End: 2025-06-17
Payer: MEDICARE

## 2025-06-17 VITALS
SYSTOLIC BLOOD PRESSURE: 144 MMHG | HEART RATE: 59 BPM | HEIGHT: 67 IN | OXYGEN SATURATION: 97 % | TEMPERATURE: 97.2 F | DIASTOLIC BLOOD PRESSURE: 74 MMHG | WEIGHT: 169.4 LBS | RESPIRATION RATE: 16 BRPM | BODY MASS INDEX: 26.59 KG/M2

## 2025-06-17 DIAGNOSIS — I10 PRIMARY HYPERTENSION: ICD-10-CM

## 2025-06-17 DIAGNOSIS — R35.1 BPH ASSOCIATED WITH NOCTURIA: ICD-10-CM

## 2025-06-17 DIAGNOSIS — I25.10 CORONARY ARTERY DISEASE INVOLVING NATIVE CORONARY ARTERY OF NATIVE HEART WITHOUT ANGINA PECTORIS: ICD-10-CM

## 2025-06-17 DIAGNOSIS — F02.A0 MILD LATE ONSET ALZHEIMER'S DEMENTIA WITHOUT BEHAVIORAL DISTURBANCE, PSYCHOTIC DISTURBANCE, MOOD DISTURBANCE, OR ANXIETY (HCC): Primary | ICD-10-CM

## 2025-06-17 DIAGNOSIS — N40.1 BPH ASSOCIATED WITH NOCTURIA: ICD-10-CM

## 2025-06-17 DIAGNOSIS — M15.0 PRIMARY OSTEOARTHRITIS INVOLVING MULTIPLE JOINTS: ICD-10-CM

## 2025-06-17 DIAGNOSIS — G30.1 MILD LATE ONSET ALZHEIMER'S DEMENTIA WITHOUT BEHAVIORAL DISTURBANCE, PSYCHOTIC DISTURBANCE, MOOD DISTURBANCE, OR ANXIETY (HCC): Primary | ICD-10-CM

## 2025-06-17 PROCEDURE — 1036F TOBACCO NON-USER: CPT | Performed by: INTERNAL MEDICINE

## 2025-06-17 PROCEDURE — 1123F ACP DISCUSS/DSCN MKR DOCD: CPT | Performed by: INTERNAL MEDICINE

## 2025-06-17 PROCEDURE — 99214 OFFICE O/P EST MOD 30 MIN: CPT | Performed by: INTERNAL MEDICINE

## 2025-06-17 PROCEDURE — 1159F MED LIST DOCD IN RCRD: CPT | Performed by: INTERNAL MEDICINE

## 2025-06-17 PROCEDURE — 1125F AMNT PAIN NOTED PAIN PRSNT: CPT | Performed by: INTERNAL MEDICINE

## 2025-06-17 PROCEDURE — G8427 DOCREV CUR MEDS BY ELIG CLIN: HCPCS | Performed by: INTERNAL MEDICINE

## 2025-06-17 PROCEDURE — 1160F RVW MEDS BY RX/DR IN RCRD: CPT | Performed by: INTERNAL MEDICINE

## 2025-06-17 PROCEDURE — G8419 CALC BMI OUT NRM PARAM NOF/U: HCPCS | Performed by: INTERNAL MEDICINE

## 2025-06-17 SDOH — ECONOMIC STABILITY: FOOD INSECURITY: WITHIN THE PAST 12 MONTHS, THE FOOD YOU BOUGHT JUST DIDN'T LAST AND YOU DIDN'T HAVE MONEY TO GET MORE.: NEVER TRUE

## 2025-06-17 SDOH — ECONOMIC STABILITY: FOOD INSECURITY: WITHIN THE PAST 12 MONTHS, YOU WORRIED THAT YOUR FOOD WOULD RUN OUT BEFORE YOU GOT MONEY TO BUY MORE.: NEVER TRUE

## 2025-06-17 ASSESSMENT — ENCOUNTER SYMPTOMS
DIARRHEA: 0
NAUSEA: 0
BLOOD IN STOOL: 0
COUGH: 0
VOMITING: 0
ABDOMINAL PAIN: 0
CONSTIPATION: 0
SHORTNESS OF BREATH: 0

## 2025-06-17 ASSESSMENT — MINI MENTAL STATE EXAM
WHAT CITY/TOWN ARE WE IN?: 1
WHAT STATE [OR PROVINCE] ARE WE IN?: 1
SAY: I WOULD LIKE YOU TO COUNT BACKWARD FROM 100 BY SEVENS: 5
SAY: READ THE WORDS ON THE PAGE AND THEN DO WHAT IT SAYS. THEN HAND THE PERSON
THE SHEET WITH CLOSE YOUR EYES ON IT. IF THE SUBJECT READS AND DOES NOT CLOSE THEIR EYES, REPEAT UP TO THREE TIMES. SCORE ONLY IF SUBJECT CLOSES EYES.: 1
WHAT COUNTRY ARE WE IN?: 1
ASK THE PERSON IF HE IS RIGHT OR LEFT-HANDED. TAKE A PIECE OF PAPER AND HOLD IT UP IN
FRONT OF THE PERSON. SAY: TAKE THIS PAPER IN YOUR RIGHT/LEFT HAND (WHICHEVER IS NON-
DOMINANT), SCORE IF PAPER IS PICKED UP IN CORRECT HAND.: 1
SUM ALL MMSE QUESTIONS FOR TOTAL SCORE [OUT OF 30].: 25
WHAT DAY OF THE WEEK IS THIS?: 1
SAY: I AM GOING TO NAME THREE OBJECTS. WHEN I AM FINISHED, I WANT YOU TO REPEAT
THEM. REMEMBER WHAT THEY ARE BECAUSE I AM GOING TO ASK YOU TO NAME THEM AGAIN IN
A FEW MINUTES.  SAY THE FOLLOWING WORDS SLOWLY AT 1-SECOND INTERVALS - BALL/ CAR/ MAN [ITERATIONS FOR REPEAT ADMINISTRATION]: 3
NOW WHAT WERE THE THREE OBJECTS I ASKED YOU TO REMEMBER?: 0
SAY: FOLD THE PAPER IN HALF ONCE WITH BOTH HANDS, SCORE IF PAPER IS CORRECTLY FOLDED IN HALF.: 1
WHAT IS THE NAME OF THIS BUILDING [IN FACILITY]?/WHAT IS THE STREET ADDRESS OF THIS HOUSE [IN HOME]?: 1
WHAT IS TODAY'S DATE?: 1
HAND THE PERSON A PENCIL AND PAPER. SAY: WRITE ANY COMPLETE SENTENCE ON THAT
PIECE OF PAPER. (NOTE: THE SENTENCE MUST MAKE SENSE. IGNORE SPELLING ERRORS): 1
WHAT FLOOR ARE WE ON [IN FACILITY]?/ WHAT ROOM ARE WE IN [IN HOME]?: 1
SAY: PUT THE PAPER DOWN ON THE FLOOR, SCORE IF PAPER IS PLACED BACK ON FLOOR: 1
SAY: I WOULD LIKE YOU TO REPEAT THIS PHRASE AFTER ME: NO IFS, ANDS, OR BUTS.: 1
WHAT MONTH IS THIS?: 1
SHOW: WRISTWATCH [OBJECT] ASK: WHAT IS THIS CALLED?: 0
WHICH SEASON IS THIS?: 1
SHOW: PENCIL [OBJECT] ASK: WHAT IS THIS CALLED?: 0
PLACE DESIGN, ERASER AND PENCIL IN FRONT OF THE PERSON.  SAY:  COPY THIS DESIGN PLEASE.  SHOW: DESIGN. ALLOW: MULTIPLE TRIES. WAIT UNTIL PERSON IS FINISHED AND HANDS IT BACK. SCORE: ONLY FOR DIAGRAM WITH 4-SIDED FIGURE BETWEEN TWO 5-SIDED FIGURES: 1
WHAT YEAR IS THIS?: 1

## 2025-06-17 ASSESSMENT — PATIENT HEALTH QUESTIONNAIRE - PHQ9
2. FEELING DOWN, DEPRESSED OR HOPELESS: NOT AT ALL
SUM OF ALL RESPONSES TO PHQ QUESTIONS 1-9: 0
SUM OF ALL RESPONSES TO PHQ QUESTIONS 1-9: 0
1. LITTLE INTEREST OR PLEASURE IN DOING THINGS: NOT AT ALL
SUM OF ALL RESPONSES TO PHQ QUESTIONS 1-9: 0
SUM OF ALL RESPONSES TO PHQ QUESTIONS 1-9: 0

## 2025-06-17 NOTE — PROGRESS NOTES
Ramírez Miller is a 89 y.o. male who was seen in clinic today (6/17/2025). He is accompanied by his wife today.    Assessment & Plan:   Below is the assessment and plan developed based on review of pertinent history, physical exam, labs, studies, and medications.    1. Mild late onset Alzheimer's dementia without behavioral disturbance, psychotic disturbance, mood disturbance, or anxiety (HCC)  Assessment & Plan:  Stable, MMSE unchanged, didn't tolerate max dose of Namenda (fatigue), will continue current doses of both Aricept and Namenda. Letter provided regarding his will/finances and concerns over one of their sons. Patient was able to convey the concerns without prompting from me or his wife.   2. Coronary artery disease involving native coronary artery of native heart without angina pectoris  Assessment & Plan:  well controlled, asymptomatic.  BP is slightly elevated today, lipids are well controlled.  Continue: current plan as BP has been improved in the past.  3. Primary hypertension  Assessment & Plan:  Just above goal, acceptable for his age & risk factors, has been fluctuating, no changes.   4. BPH associated with nocturia  Assessment & Plan:  Stable, will continue w/ current medications, due to balance issues will not offer an alpha blocker.   5. Primary osteoarthritis involving multiple joints  Assessment & Plan:  Chronic issue, using Diclofenac gel as needed and with good relief.      Return in about 6 months (around 12/17/2025) for FULL PHYSICAL.      Subjective/Objective:   Ramírez was seen today for Follow-up     Since last visit: weight is stable.     Neurological Review  He is here today to talk about [facility].  Aricept dose increased from 5mg to 10mg in Aug '22.  Namenda dose increased to max dose at last visit but due to fatigue it was reduced to 14mg.  Wife reports he is not as tired and back to baseline. MMSE today is 25/30.  It was 24/30 on 12/18/24, 27/30 on 6/18/24. Wife has no concerns.  He

## 2025-06-17 NOTE — ASSESSMENT & PLAN NOTE
Stable, will continue w/ current medications, due to balance issues will not offer an alpha blocker.

## 2025-06-17 NOTE — ASSESSMENT & PLAN NOTE
Stable, MMSE unchanged, didn't tolerate max dose of Namenda (fatigue), will continue current doses of both Aricept and Namenda. Letter provided regarding his will/finances and concerns over one of their sons. Patient was able to convey the concerns without prompting from me or his wife.

## 2025-06-17 NOTE — ASSESSMENT & PLAN NOTE
well controlled, asymptomatic.  BP is slightly elevated today, lipids are well controlled.  Continue: current plan as BP has been improved in the past.

## 2025-07-23 DIAGNOSIS — F02.A0 MILD LATE ONSET ALZHEIMER'S DEMENTIA WITHOUT BEHAVIORAL DISTURBANCE, PSYCHOTIC DISTURBANCE, MOOD DISTURBANCE, OR ANXIETY (HCC): ICD-10-CM

## 2025-07-23 DIAGNOSIS — G30.1 MILD LATE ONSET ALZHEIMER'S DEMENTIA WITHOUT BEHAVIORAL DISTURBANCE, PSYCHOTIC DISTURBANCE, MOOD DISTURBANCE, OR ANXIETY (HCC): ICD-10-CM

## 2025-07-23 RX ORDER — MEMANTINE HYDROCHLORIDE 14 MG/1
1 CAPSULE, EXTENDED RELEASE ORAL DAILY
Qty: 90 CAPSULE | Refills: 3 | Status: SHIPPED | OUTPATIENT
Start: 2025-07-23

## 2025-08-12 RX ORDER — FELODIPINE 10 MG/1
10 TABLET, EXTENDED RELEASE ORAL DAILY
Qty: 90 TABLET | Refills: 1 | Status: SHIPPED | OUTPATIENT
Start: 2025-08-12

## (undated) DEVICE — ANGIO-SEAL VIP VASCULAR CLOSURE DEVICE: Brand: ANGIO-SEAL

## (undated) DEVICE — PACK PROCEDURE SURG HRT CATH

## (undated) DEVICE — KIT HND CTRL 3 W STPCOCK ROT END 54IN PREM HI PRSS TBNG AT

## (undated) DEVICE — ANGIOGRAPHY KIT

## (undated) DEVICE — PINNACLE INTRODUCER SHEATH: Brand: PINNACLE

## (undated) DEVICE — CUSTOM KT PTCA INFL DEV K05 00052M

## (undated) DEVICE — Device

## (undated) DEVICE — HI-TORQUE FLOPPY II EXTRA SUPPORT GUIDEWIRE .014 STRAIGHT TIP 2.0 CM X 300 CM: Brand: HI-TORQUE FLOPPY

## (undated) DEVICE — ANGIOGRAPHIC CATHETER: Brand: IMPULSE™

## (undated) DEVICE — GUIDEWIRE VASC L180CM DIA0.035IN TIP L7CM PTFE S STL STR

## (undated) DEVICE — HI-TORQUE WHISPER MS GUIDE WIRE .014 STRAIGHT TIP 3.0 CM X 300 CM: Brand: HI-TORQUE WHISPER

## (undated) DEVICE — CATH GUID COR JL4.0 6FR 100CM -- LAUNCHER

## (undated) DEVICE — KIT MFLD ISOLATN NACL CNTRST PRT TBNG SPIK W/ PRSS TRNSDUC

## (undated) DEVICE — CATHETER PTCA L138CM BLLN L15MM DIA2.5MM CROSSING PROF

## (undated) DEVICE — KIT MED IMAG CNTRST AGNT W/ IOPAMIDOL REUSE